# Patient Record
Sex: FEMALE | Race: WHITE | NOT HISPANIC OR LATINO | Employment: OTHER | URBAN - METROPOLITAN AREA
[De-identification: names, ages, dates, MRNs, and addresses within clinical notes are randomized per-mention and may not be internally consistent; named-entity substitution may affect disease eponyms.]

---

## 2023-09-02 PROBLEM — H90.3 SENSORINEURAL HEARING LOSS (SNHL) OF BOTH EARS: Status: ACTIVE | Noted: 2023-09-02

## 2023-09-02 PROBLEM — E78.2 MIXED HYPERLIPIDEMIA: Status: ACTIVE | Noted: 2023-09-02

## 2023-09-02 PROBLEM — E03.9 HYPOTHYROIDISM: Status: ACTIVE | Noted: 2023-09-02

## 2023-09-02 PROBLEM — E10.65 TYPE 1 DIABETES MELLITUS WITH HYPERGLYCEMIA (MULTI): Status: ACTIVE | Noted: 2023-09-02

## 2023-09-02 PROBLEM — K21.9 GASTROESOPHAGEAL REFLUX DISEASE WITHOUT ESOPHAGITIS: Status: ACTIVE | Noted: 2023-09-02

## 2023-09-02 PROBLEM — M81.0 AGE RELATED OSTEOPOROSIS: Status: ACTIVE | Noted: 2023-09-02

## 2023-09-02 PROBLEM — G40.909 EPILEPSY (MULTI): Status: ACTIVE | Noted: 2023-09-02

## 2023-09-02 PROBLEM — N95.1 MENOPAUSAL SYMPTOM: Status: ACTIVE | Noted: 2023-09-02

## 2023-09-02 PROBLEM — E10.8: Status: ACTIVE | Noted: 2023-09-02

## 2023-09-02 PROBLEM — H81.12 BENIGN PAROXYSMAL POSITIONAL VERTIGO OF LEFT EAR: Status: ACTIVE | Noted: 2023-09-02

## 2023-09-02 PROBLEM — R26.9 GAIT ABNORMALITY: Status: ACTIVE | Noted: 2023-09-02

## 2023-09-02 PROBLEM — I10 ESSENTIAL HYPERTENSION: Status: ACTIVE | Noted: 2023-09-02

## 2023-09-02 RX ORDER — ASPIRIN 325 MG
TABLET, DELAYED RELEASE (ENTERIC COATED) ORAL EVERY OTHER DAY
COMMUNITY
End: 2024-02-08 | Stop reason: SDUPTHER

## 2023-09-02 RX ORDER — ONDANSETRON 4 MG/1
4 TABLET, ORALLY DISINTEGRATING ORAL EVERY 6 HOURS PRN
COMMUNITY
End: 2024-02-08 | Stop reason: WASHOUT

## 2023-09-02 RX ORDER — SIMVASTATIN 20 MG/1
20 TABLET, FILM COATED ORAL NIGHTLY
COMMUNITY
End: 2024-02-08 | Stop reason: SDUPTHER

## 2023-09-02 RX ORDER — LEVOTHYROXINE SODIUM 137 UG/1
1 TABLET ORAL DAILY
COMMUNITY
End: 2024-02-08 | Stop reason: SDUPTHER

## 2023-09-02 RX ORDER — PANTOPRAZOLE SODIUM 40 MG/1
40 TABLET, DELAYED RELEASE ORAL DAILY
COMMUNITY
End: 2024-02-08 | Stop reason: SDUPTHER

## 2023-09-02 RX ORDER — INSULIN ASPART 100 [IU]/ML
INJECTION, SOLUTION INTRAVENOUS; SUBCUTANEOUS
COMMUNITY
End: 2024-02-08 | Stop reason: SDUPTHER

## 2023-09-02 RX ORDER — OXYBUTYNIN CHLORIDE 10 MG/1
10 TABLET, EXTENDED RELEASE ORAL DAILY
COMMUNITY
End: 2024-02-08 | Stop reason: SDUPTHER

## 2023-09-02 RX ORDER — LOSARTAN POTASSIUM 100 MG/1
100 TABLET ORAL DAILY
COMMUNITY
End: 2024-02-08 | Stop reason: SDUPTHER

## 2023-09-02 RX ORDER — LEVETIRACETAM 750 MG/1
500 TABLET ORAL 2 TIMES DAILY
COMMUNITY
End: 2024-02-08 | Stop reason: SDUPTHER

## 2023-09-02 RX ORDER — AMLODIPINE BESYLATE 5 MG/1
1 TABLET ORAL DAILY
COMMUNITY
End: 2024-01-12 | Stop reason: SDUPTHER

## 2023-09-02 RX ORDER — MULTIVITAMIN
1 TABLET ORAL DAILY
COMMUNITY
End: 2024-02-08 | Stop reason: SDUPTHER

## 2023-09-02 RX ORDER — MECLIZINE HYDROCHLORIDE 25 MG/1
25 TABLET ORAL 3 TIMES DAILY PRN
COMMUNITY
End: 2024-02-08 | Stop reason: SDUPTHER

## 2023-09-02 RX ORDER — BLOOD SUGAR DIAGNOSTIC
STRIP MISCELLANEOUS
COMMUNITY
End: 2023-11-30

## 2023-09-16 ENCOUNTER — HOSPITAL ENCOUNTER (OUTPATIENT)
Dept: DATA CONVERSION | Facility: HOSPITAL | Age: 78
Discharge: HOME | End: 2023-09-16
Payer: MEDICARE

## 2023-09-16 DIAGNOSIS — R56.9 UNSPECIFIED CONVULSIONS (MULTI): ICD-10-CM

## 2023-10-06 ENCOUNTER — HOSPITAL ENCOUNTER (OUTPATIENT)
Dept: RADIOLOGY | Facility: HOSPITAL | Age: 78
Discharge: HOME | End: 2023-10-06
Payer: MEDICARE

## 2023-10-06 VITALS — HEIGHT: 63 IN | WEIGHT: 150 LBS | BODY MASS INDEX: 26.58 KG/M2

## 2023-10-06 DIAGNOSIS — Z12.31 ENCOUNTER FOR SCREENING MAMMOGRAM FOR MALIGNANT NEOPLASM OF BREAST: ICD-10-CM

## 2023-10-06 PROCEDURE — 77063 BREAST TOMOSYNTHESIS BI: CPT | Mod: 50

## 2023-10-17 DIAGNOSIS — S06.5XAA TRAUMATIC SUBDURAL HEMORRHAGE WITH LOSS OF CONSCIOUSNESS STATUS UNKNOWN, INITIAL ENCOUNTER (MULTI): Primary | ICD-10-CM

## 2023-10-20 ENCOUNTER — LAB (OUTPATIENT)
Dept: LAB | Facility: LAB | Age: 78
End: 2023-10-20
Payer: MEDICARE

## 2023-10-20 DIAGNOSIS — S06.5XAA TRAUMATIC SUBDURAL HEMORRHAGE WITH LOSS OF CONSCIOUSNESS STATUS UNKNOWN, INITIAL ENCOUNTER (MULTI): ICD-10-CM

## 2023-10-20 LAB — LEVETIRACETAM SERPL-MCNC: 11 UG/ML (ref 10–40)

## 2023-10-20 PROCEDURE — 80177 DRUG SCRN QUAN LEVETIRACETAM: CPT

## 2023-10-20 PROCEDURE — 36415 COLL VENOUS BLD VENIPUNCTURE: CPT

## 2023-11-30 DIAGNOSIS — E10.8 DIABETES MELLITUS TYPE 1 WITH MANIFESTATIONS (MULTI): Primary | ICD-10-CM

## 2023-11-30 RX ORDER — BLOOD SUGAR DIAGNOSTIC
STRIP MISCELLANEOUS
Qty: 900 STRIP | Refills: 3 | Status: SHIPPED | OUTPATIENT
Start: 2023-11-30

## 2023-12-07 ENCOUNTER — OFFICE VISIT (OUTPATIENT)
Dept: ENDOCRINOLOGY | Facility: CLINIC | Age: 78
End: 2023-12-07
Payer: MEDICARE

## 2023-12-07 VITALS
HEART RATE: 56 BPM | DIASTOLIC BLOOD PRESSURE: 82 MMHG | HEIGHT: 65 IN | WEIGHT: 154.2 LBS | SYSTOLIC BLOOD PRESSURE: 145 MMHG | BODY MASS INDEX: 25.69 KG/M2

## 2023-12-07 DIAGNOSIS — E06.3 HYPOTHYROIDISM DUE TO HASHIMOTO'S THYROIDITIS: ICD-10-CM

## 2023-12-07 DIAGNOSIS — I10 ESSENTIAL HYPERTENSION: ICD-10-CM

## 2023-12-07 DIAGNOSIS — E10.8 DIABETES MELLITUS TYPE 1 WITH MANIFESTATIONS (MULTI): ICD-10-CM

## 2023-12-07 DIAGNOSIS — E78.2 MIXED HYPERLIPIDEMIA: ICD-10-CM

## 2023-12-07 DIAGNOSIS — E10.65 TYPE 1 DIABETES MELLITUS WITH HYPERGLYCEMIA (MULTI): Primary | ICD-10-CM

## 2023-12-07 DIAGNOSIS — E03.8 HYPOTHYROIDISM DUE TO HASHIMOTO'S THYROIDITIS: ICD-10-CM

## 2023-12-07 LAB — POC HEMOGLOBIN A1C: 6.9 % (ref 4.2–6.5)

## 2023-12-07 PROCEDURE — 3079F DIAST BP 80-89 MM HG: CPT | Performed by: NURSE PRACTITIONER

## 2023-12-07 PROCEDURE — 99213 OFFICE O/P EST LOW 20 MIN: CPT | Performed by: NURSE PRACTITIONER

## 2023-12-07 PROCEDURE — 1126F AMNT PAIN NOTED NONE PRSNT: CPT | Performed by: NURSE PRACTITIONER

## 2023-12-07 PROCEDURE — 1036F TOBACCO NON-USER: CPT | Performed by: NURSE PRACTITIONER

## 2023-12-07 PROCEDURE — 1159F MED LIST DOCD IN RCRD: CPT | Performed by: NURSE PRACTITIONER

## 2023-12-07 PROCEDURE — 3077F SYST BP >= 140 MM HG: CPT | Performed by: NURSE PRACTITIONER

## 2023-12-07 PROCEDURE — 83036 HEMOGLOBIN GLYCOSYLATED A1C: CPT | Performed by: NURSE PRACTITIONER

## 2023-12-07 ASSESSMENT — ENCOUNTER SYMPTOMS: DEPRESSION: 0

## 2023-12-07 ASSESSMENT — PAIN SCALES - GENERAL: PAINLEVEL: 0-NO PAIN

## 2023-12-07 NOTE — PROGRESS NOTES
"HPI:  Here for follow up/metabolic management 77 yo with DM Type 1 diagnosed at age 27. History HTN, HLD, Hypothyroidism, GERD and Seizures. Current A1C 6.9% was 6.7%. Patient testing sugars about 10 times a day by finger sticks. Desire Guardian sensor, having communication issues with company. Recalls blood sugars L/D 90-140s B 150s, random excursion 200, otherwise mostly in target. Following carb controlled diet somewhat and know reasonable carb allowances. Patient able to afford their medications. Patient is exercising         -CGM discusssed and willing to use Guardian or Mariola sensor.         -INSULIN Aspart         -HTN Losartan, Amlodipine daily at goal/not at goal med/dose         -STATIN Simvastatin LDL 52         She has no desire to switch insulin pumps.    Could not download pump this visit due to technological issues.     /82 (BP Location: Left arm, Patient Position: Sitting)   Pulse 56   Ht 1.651 m (5' 5\")   Wt 69.9 kg (154 lb 3.2 oz)   BMI 25.66 kg/m²     Labs:  Lab Results   Component Value Date    WBC 4.8 04/21/2023    NRBC 0 04/21/2023    RBC 4.38 04/21/2023    HGB 13.7 04/21/2023    HCT 41.2 04/21/2023     04/21/2023     Lab Results   Component Value Date    CALCIUM 9.4 04/21/2023    AST 18 04/21/2023    ALKPHOS 82 04/21/2023    BILITOT 0.9 04/21/2023    PROT 6.2 04/21/2023    ALBUMIN 4.0 04/21/2023    GLOB 2.2 04/21/2023    AGR 1.8 04/21/2023     04/21/2023    K 4.8 04/21/2023     04/21/2023    CO2 26 04/21/2023    ANIONGAP 11 04/21/2023    BUN 18 04/21/2023    CREATININE 0.9 04/21/2023    UREACREAUR 20.0 04/21/2023    GLUCOSE 167 (H) 04/21/2023    ALT 17 04/21/2023    EGFR 67 04/21/2023     Lab Results   Component Value Date    CHOL 140 04/21/2023    TRIG 79 04/21/2023    HDL 72 04/21/2023    LDLCALC 52 (L) 04/21/2023     Lab Results   Component Value Date    MICROALBCREA 8.9 04/21/2023     Lab Results   Component Value Date    TSH 0.30 04/21/2023     Lab Results "   Component Value Date    [id number] 404 01/09/2023     Lab Results   Component Value Date    HGBA1C 6.9 (A) 12/07/2023         Current Outpatient Medications:     amLODIPine (Norvasc) 5 mg tablet, Take 1 tablet (5 mg) by mouth once daily., Disp: , Rfl:     blood sugar diagnostic (Accu-Chek Guide test strips) strip, USE TO TEST 10 TIMES DAILY, Disp: 900 strip, Rfl: 3    cholecalciferol (Vitamin D-3) 1,250 mcg (50,000 unit) capsule, Take by mouth every other day., Disp: , Rfl:     insulin aspart (NovoLOG) 100 unit/mL injection, Inject under the skin. USE AS DIRECTED, Disp: , Rfl:     levETIRAcetam (Keppra) 500 mg tablet, Take 1 tablet (500 mg) by mouth 2 times a day., Disp: , Rfl:     levothyroxine (LevoxyL) 137 mcg tablet, Take 1 tablet (137 mcg) by mouth once daily., Disp: , Rfl:     losartan (Cozaar) 100 mg tablet, Take 1 tablet (100 mg) by mouth once daily., Disp: , Rfl:     meclizine (Antivert) 25 mg tablet, Take 1 tablet (25 mg) by mouth 3 times a day as needed., Disp: , Rfl:     multivitamin tablet, Take 1 tablet by mouth once daily., Disp: , Rfl:     ondansetron ODT (Zofran-ODT) 4 mg disintegrating tablet, Take 1 tablet (4 mg) by mouth every 6 hours if needed., Disp: , Rfl:     oxybutynin XL (Ditropan-XL) 10 mg 24 hr tablet, Take 1 tablet (10 mg) by mouth once daily., Disp: , Rfl:     pantoprazole (ProtoNix) 40 mg EC tablet, Take 1 tablet (40 mg) by mouth once daily., Disp: , Rfl:     simvastatin (Zocor) 20 mg tablet, Take 1 tablet (20 mg) by mouth once daily at bedtime., Disp: , Rfl:     Review of Systems:  Cardiology: Lightheadedness-denies.  Chest pain-denies.  Leg edema-denies.  Palpitations-denies.  Respiratory: Cough-denies. Shortness of breath-denies.  Wheezing-denies.  Gastroenterology: Constipation-denies.  Diarrhea-denies.  Heartburn-denies.  Endocrinology: Cold intolerance-denies.  Heat intolerance-denies.  Sweats-denies.  Neurology: Headache-denies.  Tremor-denies.  Neuropathy in  extremities-denies.  Psychology: Low energy-denies.  Irritability-denies.  Sleep disturbances-denies.    Physical Exam:  General Appearance: pleasant, cooperative, no acute distress  HEENT: no chemosis, no proptosis, no lid lag, no lid retraction  Neck: no lymphadenopathy, no thyromegaly, no dominant thyroid nodules  Heart: no murmurs, regular rate and rhythm, S1 and S2  Lungs: no wheezes, no rhonci, no rales  Extremities: no lower extremity swelling    Assessment and Plan:  1. Type 1 diabetes mellitus with hyperglycemia (CMS/HCC)  -excellent A1c control  -no pump setting changes  -in the process of getting Guardian sensor to work with smart phone sharda     - POCT glycosylated hemoglobin (Hb A1C) manually resulted    2. Mixed hyperlipidemia  -LDL target < 70  -tolerates statin    3. Essential hypertension  -stable    4. Hypothyroidism due to Hashimoto's thyroiditis  -euthyroid  -no compressive/obstructive neck complaints     Follow Up: 3 months      -labs/tests/notes reviewed  -reviewed and counseled patient on medication monitoring and side effects

## 2024-01-08 DIAGNOSIS — E10.8 DIABETES MELLITUS TYPE 1 WITH MANIFESTATIONS (MULTI): Primary | ICD-10-CM

## 2024-01-10 ENCOUNTER — LAB (OUTPATIENT)
Dept: LAB | Facility: LAB | Age: 79
End: 2024-01-10
Payer: MEDICARE

## 2024-01-10 DIAGNOSIS — E10.8 DIABETES MELLITUS TYPE 1 WITH MANIFESTATIONS (MULTI): ICD-10-CM

## 2024-01-10 DIAGNOSIS — Z01.89 ENCOUNTER FOR OTHER SPECIFIED SPECIAL EXAMINATIONS: Primary | ICD-10-CM

## 2024-01-10 DIAGNOSIS — R56.9 UNSPECIFIED CONVULSIONS (MULTI): ICD-10-CM

## 2024-01-10 DIAGNOSIS — E10.65 TYPE 1 DIABETES MELLITUS WITH HYPERGLYCEMIA (MULTI): ICD-10-CM

## 2024-01-10 DIAGNOSIS — E03.9 HYPOTHYROIDISM, UNSPECIFIED: ICD-10-CM

## 2024-01-10 LAB
ALBUMIN SERPL-MCNC: 4.3 G/DL (ref 3.5–5)
ALP BLD-CCNC: 83 U/L (ref 35–125)
ALT SERPL-CCNC: 15 U/L (ref 5–40)
ANION GAP SERPL CALC-SCNC: 10 MMOL/L
ANION GAP SERPL CALC-SCNC: 10 MMOL/L
AST SERPL-CCNC: 16 U/L (ref 5–40)
BASOPHILS # BLD AUTO: 0.05 X10*3/UL (ref 0–0.1)
BASOPHILS NFR BLD AUTO: 1.2 %
BILIRUB DIRECT SERPL-MCNC: <0.2 MG/DL (ref 0–0.2)
BILIRUB SERPL-MCNC: 0.9 MG/DL (ref 0.1–1.2)
BUN SERPL-MCNC: 18 MG/DL (ref 8–25)
BUN SERPL-MCNC: 18 MG/DL (ref 8–25)
C PEPTIDE SERPL-MCNC: 0.1 NG/ML (ref 0.7–3.9)
CALCIUM SERPL-MCNC: 9.2 MG/DL (ref 8.5–10.4)
CALCIUM SERPL-MCNC: 9.2 MG/DL (ref 8.5–10.4)
CHLORIDE SERPL-SCNC: 105 MMOL/L (ref 97–107)
CHLORIDE SERPL-SCNC: 105 MMOL/L (ref 97–107)
CHOLEST SERPL-MCNC: 164 MG/DL (ref 133–200)
CHOLEST/HDLC SERPL: 2 {RATIO}
CO2 SERPL-SCNC: 27 MMOL/L (ref 24–31)
CO2 SERPL-SCNC: 28 MMOL/L (ref 24–31)
CREAT SERPL-MCNC: 0.8 MG/DL (ref 0.4–1.6)
CREAT SERPL-MCNC: 0.8 MG/DL (ref 0.4–1.6)
EGFRCR SERPLBLD CKD-EPI 2021: 76 ML/MIN/1.73M*2
EGFRCR SERPLBLD CKD-EPI 2021: 76 ML/MIN/1.73M*2
EOSINOPHIL # BLD AUTO: 0.09 X10*3/UL (ref 0–0.4)
EOSINOPHIL NFR BLD AUTO: 2.2 %
ERYTHROCYTE [DISTWIDTH] IN BLOOD BY AUTOMATED COUNT: 12.2 % (ref 11.5–14.5)
EST. AVERAGE GLUCOSE BLD GHB EST-MCNC: 151 MG/DL
GLUCOSE SERPL-MCNC: 214 MG/DL (ref 65–99)
GLUCOSE SERPL-MCNC: 215 MG/DL (ref 65–99)
HBA1C MFR BLD: 6.9 %
HCT VFR BLD AUTO: 44.7 % (ref 36–46)
HDLC SERPL-MCNC: 81 MG/DL
HGB BLD-MCNC: 14.4 G/DL (ref 12–16)
IMM GRANULOCYTES # BLD AUTO: 0.02 X10*3/UL (ref 0–0.5)
IMM GRANULOCYTES NFR BLD AUTO: 0.5 % (ref 0–0.9)
LDLC SERPL CALC-MCNC: 70 MG/DL (ref 65–130)
LEVETIRACETAM SERPL-MCNC: 17 UG/ML (ref 10–40)
LYMPHOCYTES # BLD AUTO: 1.17 X10*3/UL (ref 0.8–3)
LYMPHOCYTES NFR BLD AUTO: 28.5 %
MCH RBC QN AUTO: 30.3 PG (ref 26–34)
MCHC RBC AUTO-ENTMCNC: 32.2 G/DL (ref 32–36)
MCV RBC AUTO: 94 FL (ref 80–100)
MONOCYTES # BLD AUTO: 0.27 X10*3/UL (ref 0.05–0.8)
MONOCYTES NFR BLD AUTO: 6.6 %
NEUTROPHILS # BLD AUTO: 2.5 X10*3/UL (ref 1.6–5.5)
NEUTROPHILS NFR BLD AUTO: 61 %
NRBC BLD-RTO: 0 /100 WBCS (ref 0–0)
PLATELET # BLD AUTO: 249 X10*3/UL (ref 150–450)
POTASSIUM SERPL-SCNC: 4.6 MMOL/L (ref 3.4–5.1)
POTASSIUM SERPL-SCNC: 4.7 MMOL/L (ref 3.4–5.1)
PROT SERPL-MCNC: 6.2 G/DL (ref 5.9–7.9)
RBC # BLD AUTO: 4.76 X10*6/UL (ref 4–5.2)
SODIUM SERPL-SCNC: 142 MMOL/L (ref 133–145)
SODIUM SERPL-SCNC: 143 MMOL/L (ref 133–145)
T4 FREE SERPL-MCNC: 1.8 NG/DL (ref 0.9–1.7)
TRIGL SERPL-MCNC: 65 MG/DL (ref 40–150)
TSH SERPL DL<=0.05 MIU/L-ACNC: 2.19 MIU/L (ref 0.27–4.2)
WBC # BLD AUTO: 4.1 X10*3/UL (ref 4.4–11.3)

## 2024-01-10 PROCEDURE — 80177 DRUG SCRN QUAN LEVETIRACETAM: CPT

## 2024-01-10 PROCEDURE — 84439 ASSAY OF FREE THYROXINE: CPT

## 2024-01-10 PROCEDURE — 36415 COLL VENOUS BLD VENIPUNCTURE: CPT

## 2024-01-10 PROCEDURE — 85025 COMPLETE CBC W/AUTO DIFF WBC: CPT

## 2024-01-10 PROCEDURE — 80048 BASIC METABOLIC PNL TOTAL CA: CPT

## 2024-01-10 PROCEDURE — 83036 HEMOGLOBIN GLYCOSYLATED A1C: CPT

## 2024-01-10 PROCEDURE — 80053 COMPREHEN METABOLIC PANEL: CPT

## 2024-01-10 PROCEDURE — 84443 ASSAY THYROID STIM HORMONE: CPT

## 2024-01-10 PROCEDURE — 82248 BILIRUBIN DIRECT: CPT

## 2024-01-10 PROCEDURE — 84681 ASSAY OF C-PEPTIDE: CPT

## 2024-01-10 PROCEDURE — 80061 LIPID PANEL: CPT

## 2024-01-12 DIAGNOSIS — I10 ESSENTIAL HYPERTENSION: Primary | ICD-10-CM

## 2024-01-12 RX ORDER — AMLODIPINE BESYLATE 5 MG/1
5 TABLET ORAL DAILY
Qty: 90 TABLET | Refills: 3 | Status: SHIPPED | OUTPATIENT
Start: 2024-01-12 | End: 2024-02-08 | Stop reason: SDUPTHER

## 2024-02-08 ENCOUNTER — OFFICE VISIT (OUTPATIENT)
Dept: PRIMARY CARE | Facility: CLINIC | Age: 79
End: 2024-02-08
Payer: MEDICARE

## 2024-02-08 VITALS
BODY MASS INDEX: 25.16 KG/M2 | TEMPERATURE: 97.9 F | HEIGHT: 65 IN | SYSTOLIC BLOOD PRESSURE: 132 MMHG | DIASTOLIC BLOOD PRESSURE: 78 MMHG | HEART RATE: 75 BPM | OXYGEN SATURATION: 99 % | WEIGHT: 151 LBS

## 2024-02-08 DIAGNOSIS — G40.909 SEIZURE DISORDER (MULTI): ICD-10-CM

## 2024-02-08 DIAGNOSIS — E06.3 HYPOTHYROIDISM DUE TO HASHIMOTO'S THYROIDITIS: ICD-10-CM

## 2024-02-08 DIAGNOSIS — E03.8 HYPOTHYROIDISM DUE TO HASHIMOTO'S THYROIDITIS: ICD-10-CM

## 2024-02-08 DIAGNOSIS — K21.9 GASTROESOPHAGEAL REFLUX DISEASE WITHOUT ESOPHAGITIS: ICD-10-CM

## 2024-02-08 DIAGNOSIS — E78.2 MIXED HYPERLIPIDEMIA: ICD-10-CM

## 2024-02-08 DIAGNOSIS — I10 ESSENTIAL HYPERTENSION: ICD-10-CM

## 2024-02-08 DIAGNOSIS — E55.9 VITAMIN D DEFICIENCY: ICD-10-CM

## 2024-02-08 DIAGNOSIS — Z87.898 HISTORY OF VERTIGO: ICD-10-CM

## 2024-02-08 DIAGNOSIS — E10.69 TYPE 1 DIABETES MELLITUS WITH OTHER SPECIFIED COMPLICATION (MULTI): Primary | ICD-10-CM

## 2024-02-08 DIAGNOSIS — R32 URINARY INCONTINENCE, UNSPECIFIED TYPE: ICD-10-CM

## 2024-02-08 PROBLEM — E10.8: Status: RESOLVED | Noted: 2023-09-02 | Resolved: 2024-02-08

## 2024-02-08 PROBLEM — E10.9 TYPE 1 DIABETES MELLITUS (MULTI): Status: ACTIVE | Noted: 2023-09-02

## 2024-02-08 PROBLEM — R26.9 GAIT ABNORMALITY: Status: RESOLVED | Noted: 2023-09-02 | Resolved: 2024-02-08

## 2024-02-08 PROBLEM — E10.9 TYPE 1 DIABETES MELLITUS (MULTI): Status: RESOLVED | Noted: 2023-09-02 | Resolved: 2024-02-08

## 2024-02-08 PROCEDURE — 1160F RVW MEDS BY RX/DR IN RCRD: CPT | Performed by: STUDENT IN AN ORGANIZED HEALTH CARE EDUCATION/TRAINING PROGRAM

## 2024-02-08 PROCEDURE — 3078F DIAST BP <80 MM HG: CPT | Performed by: STUDENT IN AN ORGANIZED HEALTH CARE EDUCATION/TRAINING PROGRAM

## 2024-02-08 PROCEDURE — 1159F MED LIST DOCD IN RCRD: CPT | Performed by: STUDENT IN AN ORGANIZED HEALTH CARE EDUCATION/TRAINING PROGRAM

## 2024-02-08 PROCEDURE — 3075F SYST BP GE 130 - 139MM HG: CPT | Performed by: STUDENT IN AN ORGANIZED HEALTH CARE EDUCATION/TRAINING PROGRAM

## 2024-02-08 PROCEDURE — 1126F AMNT PAIN NOTED NONE PRSNT: CPT | Performed by: STUDENT IN AN ORGANIZED HEALTH CARE EDUCATION/TRAINING PROGRAM

## 2024-02-08 PROCEDURE — 99214 OFFICE O/P EST MOD 30 MIN: CPT | Performed by: STUDENT IN AN ORGANIZED HEALTH CARE EDUCATION/TRAINING PROGRAM

## 2024-02-08 PROCEDURE — 1036F TOBACCO NON-USER: CPT | Performed by: STUDENT IN AN ORGANIZED HEALTH CARE EDUCATION/TRAINING PROGRAM

## 2024-02-08 RX ORDER — PANTOPRAZOLE SODIUM 40 MG/1
40 TABLET, DELAYED RELEASE ORAL
Start: 2024-02-08

## 2024-02-08 RX ORDER — ASPIRIN 325 MG
50000 TABLET, DELAYED RELEASE (ENTERIC COATED) ORAL
Start: 2024-02-08 | End: 2024-03-07 | Stop reason: WASHOUT

## 2024-02-08 RX ORDER — INSULIN ASPART 100 [IU]/ML
INJECTION, SOLUTION INTRAVENOUS; SUBCUTANEOUS
Start: 2024-02-08 | End: 2024-03-14 | Stop reason: SDUPTHER

## 2024-02-08 RX ORDER — OXYBUTYNIN CHLORIDE 10 MG/1
10 TABLET, EXTENDED RELEASE ORAL DAILY
Start: 2024-02-08

## 2024-02-08 RX ORDER — LOSARTAN POTASSIUM 100 MG/1
100 TABLET ORAL DAILY
Start: 2024-02-08

## 2024-02-08 RX ORDER — LEVETIRACETAM 750 MG/1
750 TABLET ORAL 2 TIMES DAILY
Start: 2024-02-08

## 2024-02-08 RX ORDER — MECLIZINE HYDROCHLORIDE 25 MG/1
25 TABLET ORAL 3 TIMES DAILY PRN
Start: 2024-02-08 | End: 2024-03-28 | Stop reason: WASHOUT

## 2024-02-08 RX ORDER — MULTIVITAMIN
1 TABLET ORAL DAILY
Start: 2024-02-08 | End: 2024-03-28 | Stop reason: WASHOUT

## 2024-02-08 RX ORDER — SIMVASTATIN 20 MG/1
20 TABLET, FILM COATED ORAL DAILY
Start: 2024-02-08 | End: 2024-05-21

## 2024-02-08 RX ORDER — LEVOTHYROXINE SODIUM 137 UG/1
137 TABLET ORAL
Start: 2024-02-08

## 2024-02-08 RX ORDER — AMLODIPINE BESYLATE 5 MG/1
5 TABLET ORAL DAILY
Start: 2024-02-08 | End: 2024-04-11 | Stop reason: SDUPTHER

## 2024-02-08 ASSESSMENT — PATIENT HEALTH QUESTIONNAIRE - PHQ9
2. FEELING DOWN, DEPRESSED OR HOPELESS: NOT AT ALL
SUM OF ALL RESPONSES TO PHQ9 QUESTIONS 1 AND 2: 0
1. LITTLE INTEREST OR PLEASURE IN DOING THINGS: NOT AT ALL

## 2024-02-08 ASSESSMENT — ENCOUNTER SYMPTOMS
GASTROINTESTINAL NEGATIVE: 1
CONSTITUTIONAL NEGATIVE: 1
CARDIOVASCULAR NEGATIVE: 1
RESPIRATORY NEGATIVE: 1

## 2024-02-08 ASSESSMENT — PAIN SCALES - GENERAL: PAINLEVEL: 0-NO PAIN

## 2024-02-08 NOTE — PROGRESS NOTES
[facility]: [facility]  PROGRESS NOTE      Pat Jaimes is a 78 y.o. female that is presenting today for Follow-up (6 month).    Assessment/Plan   Diagnoses and all orders for this visit:  Type 1 diabetes mellitus with other specified complication (CMS/[facility])  -     insulin aspart (NovoLOG) 100 unit/mL injection; Take per insulin instructions. INSULIN PUMP  -     Referral to Endocrinology; Future  -     Follow Up In Primary Care; Future  Gastroesophageal reflux disease without esophagitis  -     pantoprazole (ProtoNix) 40 mg EC tablet; Take 1 tablet (40 mg) by mouth once daily in the morning. Take before meals.  Hypothyroidism due to Hashimoto's thyroiditis  -     LevoxyL 137 mcg tablet; Take 1 tablet (137 mcg) by mouth once daily in the morning. Take before meals.  Urinary incontinence, unspecified type  -     oxybutynin XL (Ditropan-XL) 10 mg 24 hr tablet; Take 1 tablet (10 mg) by mouth once daily.  Seizure disorder (CMS/[facility])  -     levETIRAcetam (Keppra) 750 mg tablet; Take 1 tablet (750 mg) by mouth 2 times a day.  Essential hypertension  -     amLODIPine (Norvasc) 5 mg tablet; Take 1 tablet (5 mg) by mouth once daily.  -     losartan (Cozaar) 100 mg tablet; Take 1 tablet (100 mg) by mouth once daily.  Mixed hyperlipidemia  -     simvastatin (Zocor) 20 mg tablet; Take 1 tablet (20 mg) by mouth once daily.  Vitamin D deficiency  -     cholecalciferol (Vitamin D-3) 50,000 unit capsule; Take 1 capsule (50,000 Units) by mouth 1 (one) time per week.  -     multivitamin tablet; Take 1 tablet by mouth once daily.  History of vertigo  -     meclizine (Antivert) 25 mg tablet; Take 1 tablet (25 mg) by mouth 3 times a day as needed for dizziness or nausea.  -     Referral to Physical Therapy; Future    - Significant medication and problem list reconciliation done today.  - A1c well-controlled. No changes at this time. Patient requesting new endocrinologist. Referral placed today.  - Patient being  evaluated for meningioma. Requesting advice on what to do during MRI because of her vertigo. Discussed with her that options are limited outside of vestibular rehabilitation. Patient agreeable.  - Patient had labwork done for this appointment. Everything looked great. Will order labwork for the patient's next appointment.    Subjective   - The patient otherwise feels well and denies any acute symptoms or concerns at this time.  - The patient denies any changes or progression of their chronic medical problems.  - The patient denies any problems or concerns with their medications.      Review of Systems   Constitutional: Negative.    Respiratory: Negative.     Cardiovascular: Negative.    Gastrointestinal: Negative.       Objective   Vitals:    02/08/24 1401   BP: 132/78   Pulse: 75   Temp: 36.6 °C (97.9 °F)   SpO2: 99%      Body mass index is 25.13 kg/m².  Physical Exam  Constitutional:       General: She is not in acute distress.  Neck:      Vascular: No carotid bruit.   Cardiovascular:      Rate and Rhythm: Normal rate and regular rhythm.      Heart sounds: Normal heart sounds.   Pulmonary:      Effort: Pulmonary effort is normal.      Breath sounds: Normal breath sounds.   Musculoskeletal:         General: No swelling.   Neurological:      Mental Status: She is alert. Mental status is at baseline.   Psychiatric:         Mood and Affect: Mood normal.       Diagnostic Results   Lab Results   Component Value Date    GLUCOSE 214 (H) 01/10/2024    CALCIUM 9.2 01/10/2024     01/10/2024    K 4.6 01/10/2024    CO2 28 01/10/2024     01/10/2024    BUN 18 01/10/2024    CREATININE 0.80 01/10/2024     Lab Results   Component Value Date    ALT 15 01/10/2024    AST 16 01/10/2024    ALKPHOS 83 01/10/2024    BILITOT 0.9 01/10/2024     Lab Results   Component Value Date    WBC 4.1 (L) 01/10/2024    HGB 14.4 01/10/2024    HCT 44.7 01/10/2024    MCV 94 01/10/2024     01/10/2024     Lab Results   Component Value  "Date    CHOL 164 01/10/2024    CHOL 140 04/21/2023    CHOL 168 06/14/2022     Lab Results   Component Value Date    HDL 81.0 01/10/2024    HDL 72 04/21/2023    HDL 82 06/14/2022     Lab Results   Component Value Date    LDLCALC 70 01/10/2024    LDLCALC 52 (L) 04/21/2023    LDLCALC 75 06/14/2022     Lab Results   Component Value Date    TRIG 65 01/10/2024    TRIG 79 04/21/2023    TRIG 54 06/14/2022     No components found for: \"CHOLHDL\"  Lab Results   Component Value Date    HGBA1C 6.9 (H) 01/10/2024     Other labs not included in the list above were reviewed either before or during this encounter.    History    Past Medical History:   Diagnosis Date    Diabetes mellitus type I (CMS/HCC)     Hyperlipidemia     Hypertension     Hypothyroidism     Insulin pump in place     Other specified diabetes mellitus without complications (CMS/HCC)     Diabetes mellitus of other type without complication    Personal history of other diseases of the circulatory system     History of hypertension    Seizure disorder (CMS/HCC)      Past Surgical History:   Procedure Laterality Date    BI STEREOTACTIC GUIDED BREAST RIGHT LOCALIZATION AND BIOPSY Right 02/02/2018    BI STEREOTACTIC GUIDED BREAST LOCALIZATION AND BIOPSY RIGHT [facility]    KNEE SURGERY Right 1989    ARTHROSCOPY    OTHER SURGICAL HISTORY  05/25/2021    Craniotomy    TUBAL LIGATION Bilateral 1982     Family History   Problem Relation Name Age of Onset    Diabetes Mother      Heart disease Mother      Heart disease Father       Social History     Socioeconomic History    Marital status:      Spouse name: Not on file    Number of children: Not on file    Years of education: Not on file    Highest education level: Not on file   Occupational History    Not on file   Tobacco Use    Smoking status: Never     Passive exposure: Never    Smokeless tobacco: Never   Vaping Use    Vaping Use: Never used   Substance and Sexual Activity    Alcohol use: Not Currently    " Drug use: Never    Sexual activity: Not on file   Other Topics Concern    Not on file   Social History Narrative    Not on file     Social Determinants of Health     Financial Resource Strain: Not on file   Food Insecurity: Not on file   Transportation Needs: Not on file   Physical Activity: Not on file   Stress: Not on file   Social Connections: Not on file   Intimate Partner Violence: Not on file   Housing Stability: Not on file     Allergies   Allergen Reactions    Sulfa (Sulfonamide Antibiotics) Hives    Sulfamethoxazole-Trimethoprim Hives    Erythromycin Other    Amoxicillin-Pot Clavulanate Diarrhea     Current Outpatient Medications on File Prior to Visit   Medication Sig Dispense Refill    blood sugar diagnostic (Accu-Chek Guide test strips) strip USE TO TEST 10 TIMES DAILY 900 strip 3    [DISCONTINUED] amLODIPine (Norvasc) 5 mg tablet Take 1 tablet (5 mg) by mouth once daily. 90 tablet 3    [DISCONTINUED] cholecalciferol (Vitamin D-3) 1,250 mcg (50,000 unit) capsule Take by mouth every other day.      [DISCONTINUED] insulin aspart (NovoLOG) 100 unit/mL injection Inject under the skin. USE AS DIRECTED      [DISCONTINUED] levETIRAcetam (Keppra) 750 mg tablet Take 500 mg by mouth 2 times a day.      [DISCONTINUED] levothyroxine (LevoxyL) 137 mcg tablet Take 1 tablet (137 mcg) by mouth once daily.      [DISCONTINUED] losartan (Cozaar) 100 mg tablet Take 1 tablet (100 mg) by mouth once daily.      [DISCONTINUED] meclizine (Antivert) 25 mg tablet Take 1 tablet (25 mg) by mouth 3 times a day as needed.      [DISCONTINUED] multivitamin tablet Take 1 tablet by mouth once daily.      [DISCONTINUED] oxybutynin XL (Ditropan-XL) 10 mg 24 hr tablet Take 1 tablet (10 mg) by mouth once daily.      [DISCONTINUED] pantoprazole (ProtoNix) 40 mg EC tablet Take 1 tablet (40 mg) by mouth once daily.      [DISCONTINUED] simvastatin (Zocor) 20 mg tablet Take 1 tablet (20 mg) by mouth once daily at bedtime.      [DISCONTINUED]  ondansetron ODT (Zofran-ODT) 4 mg disintegrating tablet Take 1 tablet (4 mg) by mouth every 6 hours if needed.       No current facility-administered medications on file prior to visit.     Immunization History   Administered Date(s) Administered    Flu vaccine, quadrivalent, high-dose, preservative free, age 65y+ (FLUZONE) 09/11/2020, 09/28/2020, 10/04/2021, 09/19/2022    Influenza, High Dose Seasonal, Preservative Free 10/02/2018    Influenza, injectable, quadrivalent 10/30/2017, 09/26/2019    Influenza, seasonal, injectable 10/01/2012, 10/01/2014, 10/15/2014, 09/24/2015    Influenza, seasonal, injectable, preservative free 10/06/2008    Novel influenza-H1N1-09, preservative-free 11/15/2009    Pfizer COVID-19 vaccine, Fall 2023, 12 years and older, (30mcg/0.3mL) 12/12/2023    Pfizer COVID-19 vaccine, bivalent, age 12 years and older (30 mcg/0.3 mL) 12/19/2022    Pfizer Purple Cap SARS-CoV-2 03/04/2021, 04/01/2021, 11/08/2021    Pneumococcal conjugate vaccine, 13-valent (PREVNAR 13) 11/22/2012    Tdap vaccine, age 7 year and older (BOOSTRIX, ADACEL) 06/11/2016    Zoster, live 12/11/2009     Patient's medical history was reviewed and updated either before or during this encounter.       En Yee MD

## 2024-02-16 ENCOUNTER — CLINICAL SUPPORT (OUTPATIENT)
Dept: PHYSICAL THERAPY | Facility: CLINIC | Age: 79
End: 2024-02-16
Payer: MEDICARE

## 2024-02-16 DIAGNOSIS — Z87.898 HISTORY OF VERTIGO: ICD-10-CM

## 2024-02-16 DIAGNOSIS — R26.89 IMBALANCE: ICD-10-CM

## 2024-02-16 DIAGNOSIS — H81.12 BPPV (BENIGN PAROXYSMAL POSITIONAL VERTIGO), LEFT: Primary | ICD-10-CM

## 2024-02-16 PROCEDURE — 97162 PT EVAL MOD COMPLEX 30 MIN: CPT | Mod: GP | Performed by: PHYSICAL THERAPIST

## 2024-02-16 PROCEDURE — 95992 CANALITH REPOSITIONING PROC: CPT | Mod: GP | Performed by: PHYSICAL THERAPIST

## 2024-02-16 ASSESSMENT — ENCOUNTER SYMPTOMS
OCCASIONAL FEELINGS OF UNSTEADINESS: 1
DEPRESSION: 0
LOSS OF SENSATION IN FEET: 0

## 2024-02-16 NOTE — PROGRESS NOTES
Physical Therapy Evaluation    Patient Name: Pat Jaimes  MRN: [id number]  Evaluation Date: 2/16/2024  Time Calculation  Start Time: 1445  Stop Time: 1532  Time Calculation (min): 47 min      Subjective   General:     Pt arrives with script,  Diagnosis: History of vertigo (Z87.898)     Patient reported hx of injury: pt has had vertigo attacks for 15 years with leaning head back or going to lye down, turning head.  Uses walking stick at times    Surgery:   Remote craniotomy    Red Flags:  none    Precautions:   Fall risk due to vertigo, DM.  Meningioma on brain, Sz hx.  Pt and spouse states no activity restrictions.  Pain/dizzy/nausea:   0/0/0 now, but severe at times    Home Living:     Pt gets around home safely  Home type: House  Stairs: Yes-reciprocal with rail  Lives with: Significant Other    Work:  retired    Prior Function Per Pt/Caregiver Report:   Pt had better balance, did not need walking stick    Pt goals: be able to get CT scan, and MRI next week (to monitor meningioma), as well as improve balance.  Imaging:  IMPRESSION:  Status post left parietal craniotomy with some calcification identified  within the left subdural space without residual subdural hematoma.     8 mm rounded extra-axial calcified mass in the left posterior parietal  region most consistent with a small meningioma.     No acute intracranial process.    OBJECTIVE:  Objective     C-spine AROM:   ext mod/major decr, flexion min decr, B rot mod decr    Vestibular/Balance Assessment:    Occulomotor, Vertigo Tests, etc. +/-   Smooth pursuits +   Saccades    VOR -   Sharp-Wil Test -   [place]-Hallpike L +, UPBEATING NYSTAGMUS   [place]-Hallpike R    Roll Test R    Roll Test L    JPSE of C-spine    [place] Convergence        Balance Test Scores    MCTSIB 2/4 (30,6,30,2) shoes on           Gait:   Decr stride and linh      Outcome Measures:  DHI 28    OP EDUCATION:   Dx, poc, Epley, pillow with MRI as allowed    Today's Treatment:  Jason  "repo  HEP to be completed daily, exercises include:  Epley as needed for + L bppv    Assessment       pt is  with chronic vertigo and needs PT improve balance/sensory integration and decr vertigo to restore function and allow MRI with less anxiety and dizziness.    Plan   Next visit retest L Rock-Hallpike, other BPPV tests as needed, begin balance activities  Skilled PT consisting of:  Aquatics, therapeutic exercise, therapeutic activity, self care, NMR, manual, thermal, electric stimulation, US, light therapy, gait training, transfer training, dry needle, canalith repositioning  Rehab Potential: good  Frequency:  2x/wk  Duration:  10 weeks    Goals:    STG:   Pt to be I in initial HEP.    LTG\"s:  - BPPV testing  Improve score on outcome form by 10 points to show incr in function.  Decr max dizziness level to 2/10 for community activity.  MCTSIB 4/4 for incr sensory integration.  .    "

## 2024-02-19 ENCOUNTER — HOSPITAL ENCOUNTER (OUTPATIENT)
Dept: RADIOLOGY | Facility: CLINIC | Age: 79
Discharge: HOME | End: 2024-02-19
Payer: MEDICARE

## 2024-02-19 DIAGNOSIS — R56.9 UNSPECIFIED CONVULSIONS (MULTI): ICD-10-CM

## 2024-02-19 PROCEDURE — 70553 MRI BRAIN STEM W/O & W/DYE: CPT

## 2024-02-19 PROCEDURE — A9575 INJ GADOTERATE MEGLUMI 0.1ML: HCPCS | Performed by: PSYCHIATRY & NEUROLOGY

## 2024-02-19 PROCEDURE — 2550000001 HC RX 255 CONTRASTS: Performed by: PSYCHIATRY & NEUROLOGY

## 2024-02-19 RX ORDER — GADOTERATE MEGLUMINE 376.9 MG/ML
15 INJECTION INTRAVENOUS
Status: COMPLETED | OUTPATIENT
Start: 2024-02-19 | End: 2024-02-19

## 2024-02-19 RX ADMIN — GADOTERATE MEGLUMINE 14 ML: 376.9 INJECTION INTRAVENOUS at 09:30

## 2024-03-05 ENCOUNTER — OFFICE VISIT (OUTPATIENT)
Dept: ENDOCRINOLOGY | Facility: CLINIC | Age: 79
End: 2024-03-05
Payer: MEDICARE

## 2024-03-05 ENCOUNTER — APPOINTMENT (OUTPATIENT)
Dept: PHYSICAL THERAPY | Facility: CLINIC | Age: 79
End: 2024-03-05
Payer: MEDICARE

## 2024-03-05 VITALS
HEART RATE: 54 BPM | SYSTOLIC BLOOD PRESSURE: 149 MMHG | WEIGHT: 153 LBS | DIASTOLIC BLOOD PRESSURE: 73 MMHG | BODY MASS INDEX: 25.46 KG/M2

## 2024-03-05 DIAGNOSIS — E78.2 MIXED HYPERLIPIDEMIA: ICD-10-CM

## 2024-03-05 DIAGNOSIS — I10 ESSENTIAL HYPERTENSION: ICD-10-CM

## 2024-03-05 DIAGNOSIS — Z96.41 INSULIN PUMP IN PLACE: ICD-10-CM

## 2024-03-05 DIAGNOSIS — E03.8 HYPOTHYROIDISM DUE TO HASHIMOTO'S THYROIDITIS: ICD-10-CM

## 2024-03-05 DIAGNOSIS — E06.3 HYPOTHYROIDISM DUE TO HASHIMOTO'S THYROIDITIS: ICD-10-CM

## 2024-03-05 DIAGNOSIS — E10.65 TYPE 1 DIABETES MELLITUS WITH HYPERGLYCEMIA (MULTI): Primary | ICD-10-CM

## 2024-03-05 LAB — POC HEMOGLOBIN A1C: 7.2 % (ref 4.2–6.5)

## 2024-03-05 PROCEDURE — 83036 HEMOGLOBIN GLYCOSYLATED A1C: CPT | Performed by: NURSE PRACTITIONER

## 2024-03-05 PROCEDURE — 3077F SYST BP >= 140 MM HG: CPT | Performed by: NURSE PRACTITIONER

## 2024-03-05 PROCEDURE — 1036F TOBACCO NON-USER: CPT | Performed by: NURSE PRACTITIONER

## 2024-03-05 PROCEDURE — 1159F MED LIST DOCD IN RCRD: CPT | Performed by: NURSE PRACTITIONER

## 2024-03-05 PROCEDURE — 95251 CONT GLUC MNTR ANALYSIS I&R: CPT | Performed by: NURSE PRACTITIONER

## 2024-03-05 PROCEDURE — 1126F AMNT PAIN NOTED NONE PRSNT: CPT | Performed by: NURSE PRACTITIONER

## 2024-03-05 PROCEDURE — 3078F DIAST BP <80 MM HG: CPT | Performed by: NURSE PRACTITIONER

## 2024-03-05 PROCEDURE — 99214 OFFICE O/P EST MOD 30 MIN: CPT | Performed by: NURSE PRACTITIONER

## 2024-03-05 PROCEDURE — 1160F RVW MEDS BY RX/DR IN RCRD: CPT | Performed by: NURSE PRACTITIONER

## 2024-03-05 ASSESSMENT — PAIN SCALES - GENERAL: PAINLEVEL: 0-NO PAIN

## 2024-03-05 ASSESSMENT — PATIENT HEALTH QUESTIONNAIRE - PHQ9
SUM OF ALL RESPONSES TO PHQ9 QUESTIONS 1 & 2: 0
2. FEELING DOWN, DEPRESSED OR HOPELESS: NOT AT ALL
1. LITTLE INTEREST OR PLEASURE IN DOING THINGS: NOT AT ALL

## 2024-03-07 ENCOUNTER — TREATMENT (OUTPATIENT)
Dept: PHYSICAL THERAPY | Facility: CLINIC | Age: 79
End: 2024-03-07
Payer: MEDICARE

## 2024-03-07 DIAGNOSIS — H81.12 BPPV (BENIGN PAROXYSMAL POSITIONAL VERTIGO), LEFT: ICD-10-CM

## 2024-03-07 DIAGNOSIS — R26.89 IMBALANCE: ICD-10-CM

## 2024-03-07 DIAGNOSIS — Z87.898 HISTORY OF VERTIGO: ICD-10-CM

## 2024-03-07 PROCEDURE — 97112 NEUROMUSCULAR REEDUCATION: CPT | Mod: GP | Performed by: PHYSICAL THERAPIST

## 2024-03-07 NOTE — PROGRESS NOTES
HPI    Presents for follow up/metabolic management 79 yo with DM Type 1 diagnosed at age 27. History HTN, HLD, Hypothyroidism, GERD and Seizures. Current A1C 7.2% was 6.9%. Now has Guardian sensor, updated 780G. Following carb controlled diet and knows reasonable carb allowances. Patient able to afford their medications. Patient is exercising/very active.         -CGM Guardian smart guard with Mini-Medtronic 780G          -INSULIN Aspart         -HTN Losartan, Amlodipine daily at goal/not at goal med/dose         -STATIN Simvastatin LDLCALC 70  Basal  0000  0.50  0300  0.70  0600  0.60  2000  0.95  ICR    0000  26.0   0700  18.0  ISF    0000  50  TARGET  0000 100-120    Pump data 14 days, downloaded and scanned into her record. Time in target 71%, no lows. Mostly wakes up around 100-140, post prandial spikes with lunch mostly.Typically goes into bedtime under 180.       Current Outpatient Medications:     amLODIPine (Norvasc) 5 mg tablet, Take 1 tablet (5 mg) by mouth once daily., Disp: , Rfl:     blood sugar diagnostic (Accu-Chek Guide test strips) strip, USE TO TEST 10 TIMES DAILY, Disp: 900 strip, Rfl: 3    levETIRAcetam (Keppra) 750 mg tablet, Take 1 tablet (750 mg) by mouth 2 times a day., Disp: , Rfl:     LevoxyL 137 mcg tablet, Take 1 tablet (137 mcg) by mouth once daily in the morning. Take before meals., Disp: , Rfl:     losartan (Cozaar) 100 mg tablet, Take 1 tablet (100 mg) by mouth once daily., Disp: , Rfl:     oxybutynin XL (Ditropan-XL) 10 mg 24 hr tablet, Take 1 tablet (10 mg) by mouth once daily., Disp: , Rfl:     pantoprazole (ProtoNix) 40 mg EC tablet, Take 1 tablet (40 mg) by mouth once daily in the morning. Take before meals., Disp: , Rfl:     simvastatin (Zocor) 20 mg tablet, Take 1 tablet (20 mg) by mouth once daily., Disp: , Rfl:     cholecalciferol (Vitamin D-3) 50,000 unit capsule, Take 1 capsule (50,000 Units) by mouth 1 (one) time per week. (Patient not taking: Reported on 3/5/2024), Disp:  , Rfl:     insulin aspart (NovoLOG) 100 unit/mL injection, Take per insulin instructions. INSULIN PUMP, Disp: , Rfl:     meclizine (Antivert) 25 mg tablet, Take 1 tablet (25 mg) by mouth 3 times a day as needed for dizziness or nausea., Disp: , Rfl:     multivitamin tablet, Take 1 tablet by mouth once daily., Disp: , Rfl:       Allergies as of 03/05/2024 - Reviewed 03/05/2024   Allergen Reaction Noted    Sulfa (sulfonamide antibiotics) Hives 09/02/2023    Sulfamethoxazole-trimethoprim Hives 09/02/2023    Erythromycin Other 09/02/2023    Amoxicillin-pot clavulanate Diarrhea 09/02/2023         Review of Systems   Cardiology: Lightheadedness-denies.  Chest pain-denies.  Leg edema-denies.  Palpitations-denies.  Respiratory: Cough-denies. Shortness of breath-denies.  Wheezing-denies.  Gastroenterology: Constipation-denies.  Diarrhea-denies.  Heartburn-denies.  Endocrinology: Cold intolerance-denies.  Heat intolerance-denies.  Sweats-denies.  Neurology: Headache-denies.  Tremor-denies.  Neuropathy in extremities-denies.  Psychology: Low energy-denies.  Irritability-denies.  Sleep disturbances-denies.      /73 (BP Location: Left arm, Patient Position: Sitting)   Pulse 54   Wt 69.4 kg (153 lb)   BMI 25.46 kg/m²       Labs:  Lab Results   Component Value Date    WBC 4.1 (L) 01/10/2024    NRBC 0.0 01/10/2024    RBC 4.76 01/10/2024    HGB 14.4 01/10/2024    HCT 44.7 01/10/2024     01/10/2024     Lab Results   Component Value Date    CALCIUM 9.2 01/10/2024    AST 16 01/10/2024    ALKPHOS 83 01/10/2024    BILITOT 0.9 01/10/2024    PROT 6.2 01/10/2024    ALBUMIN 4.3 01/10/2024    GLOB 2.2 04/21/2023    AGR 1.8 04/21/2023     01/10/2024    K 4.6 01/10/2024     01/10/2024    CO2 28 01/10/2024    ANIONGAP 10 01/10/2024    BUN 18 01/10/2024    CREATININE 0.80 01/10/2024    UREACREAUR 20.0 04/21/2023    GLUCOSE 214 (H) 01/10/2024    ALT 15 01/10/2024    EGFR 76 01/10/2024     Lab Results   Component Value  Date    CHOL 164 01/10/2024    TRIG 65 01/10/2024    HDL 81.0 01/10/2024    LDLCALC 70 01/10/2024     Lab Results   Component Value Date    MICROALBCREA 8.9 04/21/2023     Lab Results   Component Value Date    TSH 2.19 01/10/2024     Lab Results   Component Value Date    [id number] 404 01/09/2023     Lab Results   Component Value Date    HGBA1C 7.2 (A) 03/05/2024         Physical Exam   General Appearance: pleasant, cooperative, no acute distress  HEENT: no chemosis, no proptosis, no lid lag, no lid retraction  Neck: no lymphadenopathy, no thyromegaly, no dominant thyroid nodules  Heart: no murmurs, regular rate and rhythm, S1 and S2  Lungs: no wheezes, no rhonci, no rales  Extremities: no lower extremity swelling      Assessment/Plan   1. Type 1 diabetes mellitus with hyperglycemia (CMS/HCC)  -recent pump update, now using Guardian Sensor  -slight increase in A1C which is bothersome to her  -agreed to meet with PharmD educator next   - very helpful with assisting to manage pump since she is new to the update and having a sensor  -no longer checking BS 10 times a day    - POCT glycosylated hemoglobin (Hb A1C) manually resulted    2. Mixed hyperlipidemia  -tolerates statin    3. Essential hypertension  -stable    4. Hypothyroidism due to Hashimoto's thyroiditis  -euthyroid    5. Insulin pump in place  -Mini-Medtronic 780G     Follow Up: 3 months Agnieszka    -labs/tests/notes reviewed  -reviewed and counseled patient on medication monitoring and side effects    Medical Decision Making  Complexity of problem: Chronic illness of diabetes mellitus uncontrolled, progressing  Data analyzed and reviewed: Reviewed prior notes, blood glucose data, labs including HgbA1c, lipids, serum chemistries.  Ordered tests.   Risk of complications and morbidities: Is definite because of use of insulin and risk of hypoglycemia.  Prescription medications reviewed and modifications made.  Compliance assessed.  Addressed social  determinants of health including food insecurity.

## 2024-03-07 NOTE — PROGRESS NOTES
"Physical Therapy Treatment    Patient Name: Pat Jaimes  MRN: [id number]  Encounter date:  3/7/2024  Time Calculation  Start Time: 1128  Stop Time: 1208  Time Calculation (min): 40 min     PT Therapeutic Procedures Time Entry  Neuromuscular Re-Education Time Entry: 40    Visit Number:  2 (including evaluation)  Planned total visits: 10  Visit Authorized:  MEDICARE A&B, 240 DED, 80% [place], [place], [employer]    Current Problem  1. History of vertigo  Follow Up In Physical Therapy      2. BPPV (benign paroxysmal positional vertigo), left  Follow Up In Physical Therapy      3. Imbalance  Follow Up In Physical Therapy          Surgery  Remote craniotomy     Precautions      Fall risk due to vertigo, DM.  Meningioma on brain, Sz hx.   Pain/dizzy/nausea:   5/0/0 now     Location knees  Description ache  Subjective  General        Progress made, no more vertigo, got MRI.    Objective  - H smooth pursuits  - B [place]-Hallpike  Holds onto spouse at times with ambulation    Treatment:     Seated on plinth:  VOR H AND V, 2 X 30\" ea  Saccades V, H and D DNP  Smooth pursuits V, H  30\" ea  Morris string 60\"  Busy pattern-DNP  DVA-DNP  VOR cancellation to metronome at 50 bps    Stand on foam:  EO 60\"  EC 60\"  V and H HT's x10 ea  Heel/toe rocks x 20  March x 20    Stand on wedge to control retropulsion: dnp    Activity tolerance:  Improving     OP EDUCATION:   Schedule more visits    Assessment:     Pt's response to treatment:  pt tolerates all new TE's without pain incr.  Areas of improvements:  vertigo  Limitations/deficits:  sensory integration    Pain end of session:  NO INCR, no D/N    Plan:     Continue with current POC with the following changes balance focus  Continued PT required to reach all goals and restore function  Assessment of current progress against goals:  Progressing toward functional goals    Goals:      STG:   Pt to be I in initial HEP.     LTG\"s:  - BPPV testing  Improve score on outcome form by 10 " points to show incr in function.  Decr max dizziness level to 2/10 for community activity.  MCTSIB 4/4 for incr sensory integration.

## 2024-03-12 ENCOUNTER — TREATMENT (OUTPATIENT)
Dept: PHYSICAL THERAPY | Facility: CLINIC | Age: 79
End: 2024-03-12
Payer: MEDICARE

## 2024-03-12 DIAGNOSIS — R26.89 IMBALANCE: ICD-10-CM

## 2024-03-12 DIAGNOSIS — H81.12 BPPV (BENIGN PAROXYSMAL POSITIONAL VERTIGO), LEFT: ICD-10-CM

## 2024-03-12 DIAGNOSIS — Z87.898 HISTORY OF VERTIGO: ICD-10-CM

## 2024-03-12 PROCEDURE — 97112 NEUROMUSCULAR REEDUCATION: CPT | Mod: GP | Performed by: PHYSICAL THERAPIST

## 2024-03-12 NOTE — PROGRESS NOTES
"Physical Therapy Treatment    Patient Name: Pat Jaimes  MRN: [id number]  Encounter date:  3/12/2024  Time Calculation  Start Time: 1145  Stop Time: 1225  Time Calculation (min): 40 min     PT Therapeutic Procedures Time Entry  Neuromuscular Re-Education Time Entry: 40    Visit Number:  3 (including evaluation)  Planned total visits: 10  Visit Authorized:  MEDICARE A&B, 240 DED, 80% [place], [employer]    Current Problem  1. History of vertigo  Follow Up In Physical Therapy      2. BPPV (benign paroxysmal positional vertigo), left  Follow Up In Physical Therapy      3. Imbalance  Follow Up In Physical Therapy          Surgery  Remote craniotomy     Precautions      Fall risk due to vertigo, DM.  Meningioma on brain, Sz hx.   Pain/dizzy/nausea:   4/0/0 now     Location knees  Description ache  Subjective  General        No more vertigo, got MRI-states MRI was OK.  Pt and spouse feel very comfortable coming here and are happy with her progress.  Pt may want to go to an ortho for her knees and hips- provided references.    Objective    - B Roll tests  - [place]-Hallpike   Mild + HIT to L    Prior:  - H smooth pursuits  - B [place]-Hallpike    Holds onto spouse at times with ambulation    Treatment:     Seated on plinth:  VOR H AND V, 2 X 30\" ea  Saccades V, H and D x 10 ea  Smooth pursuits V, H , D ea  [place]ris string 60\" DNP  Busy pattern-DNP  DVA-DNP  VOR cancellation to metronome at 50 bps DNP    Gaze stabilization with:  Gait 75' V and H HT's. Varying speeds, start/stop    DNP:  Stand on foam:  EO 60\"  EC 60\"  V and H HT's x10 ea  Heel/toe rocks x 20  March x 20    Stand on wedge to control retropulsion: dnp    Activity tolerance:  Improving     OP EDUCATION:   Schedule more visits    Assessment:     Pt's response to treatment:  pt tolerates all new TE's without pain incr.  Areas of improvements:  vertigo  Limitations/deficits:  sensory integration    Pain end of session:  NO INCR, no D/N    Plan:   " "  Continue with current POC with the following changes balance and gaze stabilization focus  Continued PT required to reach all goals and restore function  Assessment of current progress against goals:  Progressing toward functional goals    Goals:      STG:   Pt to be I in initial HEP.     LTG\"s:  - BPPV testing  Improve score on outcome form by 10 points to show incr in function.  Decr max dizziness level to 2/10 for community activity.  MCTSIB 4/4 for incr sensory integration.         "

## 2024-03-14 ENCOUNTER — TREATMENT (OUTPATIENT)
Dept: PHYSICAL THERAPY | Facility: CLINIC | Age: 79
End: 2024-03-14
Payer: MEDICARE

## 2024-03-14 DIAGNOSIS — R26.89 IMBALANCE: ICD-10-CM

## 2024-03-14 DIAGNOSIS — E10.69 TYPE 1 DIABETES MELLITUS WITH OTHER SPECIFIED COMPLICATION (MULTI): ICD-10-CM

## 2024-03-14 DIAGNOSIS — Z87.898 HISTORY OF VERTIGO: ICD-10-CM

## 2024-03-14 DIAGNOSIS — H81.12 BPPV (BENIGN PAROXYSMAL POSITIONAL VERTIGO), LEFT: ICD-10-CM

## 2024-03-14 PROCEDURE — 97112 NEUROMUSCULAR REEDUCATION: CPT | Mod: GP | Performed by: PHYSICAL THERAPIST

## 2024-03-14 PROCEDURE — 97110 THERAPEUTIC EXERCISES: CPT | Mod: GP | Performed by: PHYSICAL THERAPIST

## 2024-03-14 RX ORDER — INSULIN ASPART INJECTION 100 [IU]/ML
INJECTION, SOLUTION SUBCUTANEOUS
Qty: 90 ML | Refills: 3 | Status: SHIPPED | OUTPATIENT
Start: 2024-03-14

## 2024-03-14 NOTE — TELEPHONE ENCOUNTER
Pharmacy said Medicare needs new Rx for her insulin the insulin aspart not on formulary  the fiasp is

## 2024-03-14 NOTE — PROGRESS NOTES
"Physical Therapy Treatment    Patient Name: Pat Jaimes  MRN: [id number]  Encounter date:  3/14/2024  Time Calculation  Start Time: 1132  Stop Time: 1210  Time Calculation (min): 38 min     PT Therapeutic Procedures Time Entry  Neuromuscular Re-Education Time Entry: 33  Therapeutic Exercise Time Entry: 5    Visit Number:  4 (including evaluation)  Planned total visits: 10  Visit Authorized:  MEDICARE A&B, 240 DED, 80% [place], [employer]    Current Problem  1. History of vertigo  Follow Up In Physical Therapy      2. BPPV (benign paroxysmal positional vertigo), left  Follow Up In Physical Therapy      3. Imbalance  Follow Up In Physical Therapy          Surgery  Remote craniotomy     Precautions      Fall risk due to vertigo, DM.  Meningioma on brain, Sz hx.   Pain/dizzy/nausea:   0/0/0 now     Location knees  Description ache  Subjective  General      Doing very well.    Objective    - B Roll tests  - [place]-Hallpike   Mild + HIT to L    Prior:  - H smooth pursuits  - B [place]-Hallpike    Holds onto spouse at times with ambulation    Treatment:        Rec bike lv 4, 5'  Gaze stabilization with:  Gait in bars V and H HT's, vor, backward, F, s/s  [place] turns 2 x 2 ea    Stand on foam:  EO 60\"  EC 60\"  V and H HT's x10 ea  Convergence x 15, and also with VOR  Heel/toe rocks x 20  March x 20    Stand on wedge to control retropulsion:     Seated on plinth:  VOR H AND V, 2 X 30\" ea  Saccades V, H and D x 10 ea  Smooth pursuits V, H , D ea  Morris string 60\" DNP  Busy pattern-DNP  DVA-DNP  VOR cancellation to metronome at 50 bps DNP    Activity tolerance:  Improving     OP EDUCATION:   Use rocking chair and rec bike (start 5') at home  diaphragmatic breathing  HEP:    Assessment:     Pt's response to treatment:  pt tolerates all new TE's without pain incr.  Areas of improvements:  vertigo  Limitations/deficits:  sensory integration    Pain end of session:  NO INCR, no D/N    Plan:     Continue with current POC " "with the following changes balance and gaze stabilization focus  Continued PT required to reach all goals and restore function  Assessment of current progress against goals:  Progressing toward functional goals    Goals:      STG:   Pt to be I in initial HEP.     LTG\"s:  - BPPV testing  Improve score on outcome form by 10 points to show incr in function.  Decr max dizziness level to 2/10 for community activity.  MCTSIB 4/4 for incr sensory integration.         "

## 2024-03-19 ENCOUNTER — APPOINTMENT (OUTPATIENT)
Dept: ENDOCRINOLOGY | Facility: CLINIC | Age: 79
End: 2024-03-19
Payer: MEDICARE

## 2024-03-19 ENCOUNTER — TREATMENT (OUTPATIENT)
Dept: PHYSICAL THERAPY | Facility: CLINIC | Age: 79
End: 2024-03-19
Payer: MEDICARE

## 2024-03-19 DIAGNOSIS — H81.12 BPPV (BENIGN PAROXYSMAL POSITIONAL VERTIGO), LEFT: ICD-10-CM

## 2024-03-19 DIAGNOSIS — R26.89 IMBALANCE: ICD-10-CM

## 2024-03-19 DIAGNOSIS — Z87.898 HISTORY OF VERTIGO: ICD-10-CM

## 2024-03-19 PROCEDURE — 97112 NEUROMUSCULAR REEDUCATION: CPT | Mod: GP | Performed by: PHYSICAL THERAPIST

## 2024-03-19 PROCEDURE — 97110 THERAPEUTIC EXERCISES: CPT | Mod: GP | Performed by: PHYSICAL THERAPIST

## 2024-03-19 NOTE — PROGRESS NOTES
"Physical Therapy Treatment    Patient Name: Pat Jaimes  MRN: [id number]  Encounter date:  3/19/2024  Time Calculation  Start Time: 0845  Stop Time: 0923  Time Calculation (min): 38 min     PT Therapeutic Procedures Time Entry  Neuromuscular Re-Education Time Entry: 32  Therapeutic Exercise Time Entry: 6    Visit Number:  5 (including evaluation)  Planned total visits: 10  Visit Authorized:  MEDICARE A&B, 240 DED, 80% [place], [employer]    Current Problem  1. History of vertigo  Follow Up In Physical Therapy      2. BPPV (benign paroxysmal positional vertigo), left  Follow Up In Physical Therapy      3. Imbalance  Follow Up In Physical Therapy          Surgery  Remote craniotomy     Precautions      Fall risk due to vertigo, DM.  Meningioma on brain, Sz hx.     Pain/dizzy/nausea:   0/0/0 now     Location knees  Description ache  Subjective  General     No more dizziness, but of balance at times    Objective  MCTSIB 3/4    Prior:  - B Roll tests  - [place]-Hallpike   Mild + HIT to L  - H smooth pursuits  - B [place]-Hallpike    Treatment:     Seated:    Baps lv 2 df/pf, inv/ev 2 x 20 ea  Rec bike lv 4, 6'  Gaze stabilization with:  Gait in bars V and H HT's, vor, backward, F, s/s  [place] turns 2 x 2 ea  STS 2 x 10  VOR cancellation to metronome at 50 bps     Stand on foam:  EO 60\"  EC 60\"  V and H HT's x10 ea  Convergence x 15, and also with VOR  F and lat step ups x 10 ea on foam      Stand on wedge to control retropulsion:  EO, EC 1' ea, V and H Ht's x 10 ea    Seated on plinth:  VOR H AND V, 2 X 30\" ea  Saccades V, H and D x 10 ea  Heel/toe rocks x 20  March x 20  Morris string 60\" DNP  Busy pattern-DNP  DVA-DNP  VOR cancellation to metronome at 50 bps     Activity tolerance:  good    OP EDUCATION:  Continue prior HEP:   Use rocking chair and rec bike (start 5') at home  diaphragmatic breathing  HEP:    Assessment:     Pt's response to treatment:  pt tolerates all new TE's without pain incr.  Areas of " "improvements:  vertigo, pt no longer seen holding onto spouse with ambulation  Limitations/deficits:  sensory integration    Pain end of session:  NO INCR, no D/N    Plan:     Continue with current POC with the following changes balance and gaze stabilization focus  Continued PT required to reach all goals and restore function  Assessment of current progress against goals:  Progressing toward functional goals    Goals:      STG:   Pt to be I in initial HEP.     LTG\"s:  - BPPV testing  Improve score on outcome form by 10 points to show incr in function.  Decr max dizziness level to 2/10 for community activity.  MCTSIB 4/4 for incr sensory integration.         "

## 2024-03-20 ENCOUNTER — HOSPITAL ENCOUNTER (OUTPATIENT)
Facility: HOSPITAL | Age: 79
Setting detail: OBSERVATION
Discharge: HOME | End: 2024-03-21
Attending: EMERGENCY MEDICINE | Admitting: INTERNAL MEDICINE
Payer: MEDICARE

## 2024-03-20 ENCOUNTER — APPOINTMENT (OUTPATIENT)
Dept: RADIOLOGY | Facility: HOSPITAL | Age: 79
End: 2024-03-20
Payer: MEDICARE

## 2024-03-20 ENCOUNTER — APPOINTMENT (OUTPATIENT)
Dept: CARDIOLOGY | Facility: HOSPITAL | Age: 79
End: 2024-03-20
Payer: MEDICARE

## 2024-03-20 DIAGNOSIS — E10.9 TYPE 1 DIABETES MELLITUS WITHOUT COMPLICATION (MULTI): ICD-10-CM

## 2024-03-20 DIAGNOSIS — R55 SYNCOPE, UNSPECIFIED SYNCOPE TYPE: Primary | ICD-10-CM

## 2024-03-20 DIAGNOSIS — R55 SYNCOPE AND COLLAPSE: ICD-10-CM

## 2024-03-20 LAB
ALBUMIN SERPL-MCNC: 4.2 G/DL (ref 3.5–5)
ALP BLD-CCNC: 88 U/L (ref 35–125)
ALT SERPL-CCNC: 19 U/L (ref 5–40)
ANION GAP SERPL CALC-SCNC: 10 MMOL/L
AORTIC VALVE MEAN GRADIENT: 2.8 MMHG
AORTIC VALVE PEAK VELOCITY: 1.19 M/S
AST SERPL-CCNC: 20 U/L (ref 5–40)
AV PEAK GRADIENT: 5.6 MMHG
AVA (PEAK VEL): 2.56 CM2
AVA (VTI): 2.39 CM2
BASOPHILS # BLD AUTO: 0.03 X10*3/UL (ref 0–0.1)
BASOPHILS NFR BLD AUTO: 0.6 %
BILIRUB SERPL-MCNC: 0.9 MG/DL (ref 0.1–1.2)
BUN SERPL-MCNC: 14 MG/DL (ref 8–25)
CALCIUM SERPL-MCNC: 9.1 MG/DL (ref 8.5–10.4)
CHLORIDE SERPL-SCNC: 105 MMOL/L (ref 97–107)
CO2 SERPL-SCNC: 26 MMOL/L (ref 24–31)
CREAT SERPL-MCNC: 0.7 MG/DL (ref 0.4–1.6)
EGFRCR SERPLBLD CKD-EPI 2021: 90 ML/MIN/1.73M*2
EJECTION FRACTION APICAL 4 CHAMBER: 57
EJECTION FRACTION: 62 %
EOSINOPHIL # BLD AUTO: 0.06 X10*3/UL (ref 0–0.4)
EOSINOPHIL NFR BLD AUTO: 1.3 %
ERYTHROCYTE [DISTWIDTH] IN BLOOD BY AUTOMATED COUNT: 12.4 % (ref 11.5–14.5)
GLUCOSE BLD MANUAL STRIP-MCNC: 205 MG/DL (ref 74–99)
GLUCOSE SERPL-MCNC: 198 MG/DL (ref 65–99)
HCT VFR BLD AUTO: 44.7 % (ref 36–46)
HGB BLD-MCNC: 14.7 G/DL (ref 12–16)
IMM GRANULOCYTES # BLD AUTO: 0.02 X10*3/UL (ref 0–0.5)
IMM GRANULOCYTES NFR BLD AUTO: 0.4 % (ref 0–0.9)
LEFT ATRIUM VOLUME AREA LENGTH INDEX BSA: 21.3 ML/M2
LEFT VENTRICLE INTERNAL DIMENSION DIASTOLE: 4.05 CM (ref 3.5–6)
LEFT VENTRICULAR OUTFLOW TRACT DIAMETER: 2.08 CM
LYMPHOCYTES # BLD AUTO: 1.18 X10*3/UL (ref 0.8–3)
LYMPHOCYTES NFR BLD AUTO: 24.7 %
MAGNESIUM SERPL-MCNC: 2.1 MG/DL (ref 1.6–3.1)
MCH RBC QN AUTO: 30.4 PG (ref 26–34)
MCHC RBC AUTO-ENTMCNC: 32.9 G/DL (ref 32–36)
MCV RBC AUTO: 93 FL (ref 80–100)
MITRAL VALVE E/A RATIO: 0.67
MITRAL VALVE E/E' RATIO: 11.22
MONOCYTES # BLD AUTO: 0.3 X10*3/UL (ref 0.05–0.8)
MONOCYTES NFR BLD AUTO: 6.3 %
NEUTROPHILS # BLD AUTO: 3.18 X10*3/UL (ref 1.6–5.5)
NEUTROPHILS NFR BLD AUTO: 66.7 %
NRBC BLD-RTO: 0 /100 WBCS (ref 0–0)
PLATELET # BLD AUTO: 223 X10*3/UL (ref 150–450)
POTASSIUM SERPL-SCNC: 3.8 MMOL/L (ref 3.4–5.1)
PROT SERPL-MCNC: 6.9 G/DL (ref 5.9–7.9)
RBC # BLD AUTO: 4.83 X10*6/UL (ref 4–5.2)
RIGHT VENTRICLE FREE WALL PEAK S': 16.92 CM/S
RIGHT VENTRICLE PEAK SYSTOLIC PRESSURE: 29.1 MMHG
SODIUM SERPL-SCNC: 141 MMOL/L (ref 133–145)
TRICUSPID ANNULAR PLANE SYSTOLIC EXCURSION: 1.9 CM
TROPONIN T SERPL-MCNC: 10 NG/L
TROPONIN T SERPL-MCNC: 11 NG/L
TROPONIN T SERPL-MCNC: 9 NG/L
WBC # BLD AUTO: 4.8 X10*3/UL (ref 4.4–11.3)

## 2024-03-20 PROCEDURE — 71046 X-RAY EXAM CHEST 2 VIEWS: CPT

## 2024-03-20 PROCEDURE — 2500000004 HC RX 250 GENERAL PHARMACY W/ HCPCS (ALT 636 FOR OP/ED): Performed by: NURSE PRACTITIONER

## 2024-03-20 PROCEDURE — 70450 CT HEAD/BRAIN W/O DYE: CPT

## 2024-03-20 PROCEDURE — 71046 X-RAY EXAM CHEST 2 VIEWS: CPT | Performed by: RADIOLOGY

## 2024-03-20 PROCEDURE — 93005 ELECTROCARDIOGRAM TRACING: CPT

## 2024-03-20 PROCEDURE — 84484 ASSAY OF TROPONIN QUANT: CPT

## 2024-03-20 PROCEDURE — 93880 EXTRACRANIAL BILAT STUDY: CPT | Performed by: INTERNAL MEDICINE

## 2024-03-20 PROCEDURE — 83735 ASSAY OF MAGNESIUM: CPT

## 2024-03-20 PROCEDURE — G0378 HOSPITAL OBSERVATION PER HR: HCPCS

## 2024-03-20 PROCEDURE — 82947 ASSAY GLUCOSE BLOOD QUANT: CPT

## 2024-03-20 PROCEDURE — 96372 THER/PROPH/DIAG INJ SC/IM: CPT

## 2024-03-20 PROCEDURE — 70450 CT HEAD/BRAIN W/O DYE: CPT | Performed by: RADIOLOGY

## 2024-03-20 PROCEDURE — 2500000001 HC RX 250 WO HCPCS SELF ADMINISTERED DRUGS (ALT 637 FOR MEDICARE OP): Performed by: NURSE PRACTITIONER

## 2024-03-20 PROCEDURE — 93306 TTE W/DOPPLER COMPLETE: CPT | Performed by: INTERNAL MEDICINE

## 2024-03-20 PROCEDURE — 96372 THER/PROPH/DIAG INJ SC/IM: CPT | Performed by: NURSE PRACTITIONER

## 2024-03-20 PROCEDURE — 36415 COLL VENOUS BLD VENIPUNCTURE: CPT

## 2024-03-20 PROCEDURE — 73110 X-RAY EXAM OF WRIST: CPT | Mod: LEFT SIDE | Performed by: RADIOLOGY

## 2024-03-20 PROCEDURE — 73110 X-RAY EXAM OF WRIST: CPT | Mod: LT

## 2024-03-20 PROCEDURE — 99285 EMERGENCY DEPT VISIT HI MDM: CPT | Mod: 25

## 2024-03-20 PROCEDURE — 80053 COMPREHEN METABOLIC PANEL: CPT

## 2024-03-20 PROCEDURE — 93306 TTE W/DOPPLER COMPLETE: CPT

## 2024-03-20 PROCEDURE — 72125 CT NECK SPINE W/O DYE: CPT

## 2024-03-20 PROCEDURE — 99222 1ST HOSP IP/OBS MODERATE 55: CPT | Performed by: NURSE PRACTITIONER

## 2024-03-20 PROCEDURE — 85025 COMPLETE CBC W/AUTO DIFF WBC: CPT

## 2024-03-20 PROCEDURE — 72125 CT NECK SPINE W/O DYE: CPT | Performed by: RADIOLOGY

## 2024-03-20 PROCEDURE — 2500000002 HC RX 250 W HCPCS SELF ADMINISTERED DRUGS (ALT 637 FOR MEDICARE OP, ALT 636 FOR OP/ED): Mod: MUE | Performed by: NURSE PRACTITIONER

## 2024-03-20 PROCEDURE — 93880 EXTRACRANIAL BILAT STUDY: CPT

## 2024-03-20 RX ORDER — ONDANSETRON 4 MG/1
4 TABLET, FILM COATED ORAL 4 TIMES DAILY PRN
Status: DISCONTINUED | OUTPATIENT
Start: 2024-03-20 | End: 2024-03-21 | Stop reason: HOSPADM

## 2024-03-20 RX ORDER — AMLODIPINE BESYLATE 5 MG/1
5 TABLET ORAL DAILY
Status: DISCONTINUED | OUTPATIENT
Start: 2024-03-21 | End: 2024-03-21 | Stop reason: HOSPADM

## 2024-03-20 RX ORDER — DEXTROSE 50 % IN WATER (D50W) INTRAVENOUS SYRINGE
25
Status: DISCONTINUED | OUTPATIENT
Start: 2024-03-20 | End: 2024-03-21 | Stop reason: HOSPADM

## 2024-03-20 RX ORDER — OXYBUTYNIN CHLORIDE 5 MG/1
5 TABLET ORAL 2 TIMES DAILY
Status: DISCONTINUED | OUTPATIENT
Start: 2024-03-20 | End: 2024-03-21 | Stop reason: HOSPADM

## 2024-03-20 RX ORDER — ACETAMINOPHEN 160 MG/5ML
650 SOLUTION ORAL EVERY 4 HOURS PRN
Status: DISCONTINUED | OUTPATIENT
Start: 2024-03-20 | End: 2024-03-21 | Stop reason: HOSPADM

## 2024-03-20 RX ORDER — ACETAMINOPHEN 325 MG/1
650 TABLET ORAL EVERY 4 HOURS PRN
Status: DISCONTINUED | OUTPATIENT
Start: 2024-03-20 | End: 2024-03-21 | Stop reason: HOSPADM

## 2024-03-20 RX ORDER — LOSARTAN POTASSIUM 100 MG/1
100 TABLET ORAL DAILY
Status: DISCONTINUED | OUTPATIENT
Start: 2024-03-20 | End: 2024-03-21 | Stop reason: HOSPADM

## 2024-03-20 RX ORDER — ACETAMINOPHEN 650 MG/1
650 SUPPOSITORY RECTAL EVERY 4 HOURS PRN
Status: DISCONTINUED | OUTPATIENT
Start: 2024-03-20 | End: 2024-03-21 | Stop reason: HOSPADM

## 2024-03-20 RX ORDER — HEPARIN SODIUM 5000 [USP'U]/ML
5000 INJECTION, SOLUTION INTRAVENOUS; SUBCUTANEOUS EVERY 8 HOURS SCHEDULED
Status: DISCONTINUED | OUTPATIENT
Start: 2024-03-20 | End: 2024-03-21 | Stop reason: HOSPADM

## 2024-03-20 RX ORDER — ACETAMINOPHEN 500 MG
TABLET ORAL EVERY 6 HOURS PRN
COMMUNITY
End: 2024-03-28 | Stop reason: WASHOUT

## 2024-03-20 RX ORDER — DEXTROSE 50 % IN WATER (D50W) INTRAVENOUS SYRINGE
12.5
Status: DISCONTINUED | OUTPATIENT
Start: 2024-03-20 | End: 2024-03-21 | Stop reason: HOSPADM

## 2024-03-20 RX ORDER — SIMVASTATIN 20 MG/1
20 TABLET, FILM COATED ORAL NIGHTLY
Status: DISCONTINUED | OUTPATIENT
Start: 2024-03-20 | End: 2024-03-21 | Stop reason: HOSPADM

## 2024-03-20 RX ORDER — PANTOPRAZOLE SODIUM 40 MG/1
40 TABLET, DELAYED RELEASE ORAL
Status: DISCONTINUED | OUTPATIENT
Start: 2024-03-21 | End: 2024-03-21 | Stop reason: HOSPADM

## 2024-03-20 RX ORDER — LEVETIRACETAM 750 MG/1
750 TABLET ORAL 2 TIMES DAILY
Status: DISCONTINUED | OUTPATIENT
Start: 2024-03-20 | End: 2024-03-21 | Stop reason: HOSPADM

## 2024-03-20 RX ORDER — ONDANSETRON HYDROCHLORIDE 2 MG/ML
4 INJECTION, SOLUTION INTRAVENOUS 4 TIMES DAILY PRN
Status: DISCONTINUED | OUTPATIENT
Start: 2024-03-20 | End: 2024-03-21 | Stop reason: HOSPADM

## 2024-03-20 RX ADMIN — HEPARIN SODIUM 5000 UNITS: 5000 INJECTION, SOLUTION INTRAVENOUS; SUBCUTANEOUS at 15:22

## 2024-03-20 RX ADMIN — LEVETIRACETAM 750 MG: 750 TABLET, FILM COATED ORAL at 20:15

## 2024-03-20 RX ADMIN — HEPARIN SODIUM 5000 UNITS: 5000 INJECTION, SOLUTION INTRAVENOUS; SUBCUTANEOUS at 21:27

## 2024-03-20 RX ADMIN — LOSARTAN POTASSIUM 100 MG: 100 TABLET, FILM COATED ORAL at 15:22

## 2024-03-20 RX ADMIN — SIMVASTATIN 20 MG: 20 TABLET, FILM COATED ORAL at 20:15

## 2024-03-20 SDOH — ECONOMIC STABILITY: FOOD INSECURITY: WITHIN THE PAST 12 MONTHS, THE FOOD YOU BOUGHT JUST DIDN'T LAST AND YOU DIDN'T HAVE MONEY TO GET MORE.: NEVER TRUE

## 2024-03-20 SDOH — SOCIAL STABILITY: SOCIAL INSECURITY
WITHIN THE LAST YEAR, HAVE YOU BEEN KICKED, HIT, SLAPPED, OR OTHERWISE PHYSICALLY HURT BY YOUR PARTNER OR EX-PARTNER?: NO

## 2024-03-20 SDOH — SOCIAL STABILITY: SOCIAL NETWORK
IN A TYPICAL WEEK, HOW MANY TIMES DO YOU TALK ON THE PHONE WITH FAMILY, FRIENDS, OR NEIGHBORS?: MORE THAN THREE TIMES A WEEK

## 2024-03-20 SDOH — ECONOMIC STABILITY: INCOME INSECURITY: IN THE PAST 12 MONTHS HAS THE ELECTRIC, GAS, OIL, OR WATER COMPANY THREATENED TO SHUT OFF SERVICES IN YOUR HOME?: NO

## 2024-03-20 SDOH — ECONOMIC STABILITY: INCOME INSECURITY: HOW HARD IS IT FOR YOU TO PAY FOR THE VERY BASICS LIKE FOOD, HOUSING, MEDICAL CARE, AND HEATING?: NOT HARD AT ALL

## 2024-03-20 SDOH — ECONOMIC STABILITY: HOUSING INSECURITY
IN THE LAST 12 MONTHS, WAS THERE A TIME WHEN YOU DID NOT HAVE A STEADY PLACE TO SLEEP OR SLEPT IN A SHELTER (INCLUDING NOW)?: NO

## 2024-03-20 SDOH — SOCIAL STABILITY: SOCIAL INSECURITY
WITHIN THE LAST YEAR, HAVE YOU BEEN RAPED OR FORCED TO HAVE ANY KIND OF SEXUAL ACTIVITY BY YOUR PARTNER OR EX-PARTNER?: NO

## 2024-03-20 SDOH — ECONOMIC STABILITY: INCOME INSECURITY: IN THE PAST 12 MONTHS, HAS THE ELECTRIC, GAS, OIL, OR WATER COMPANY THREATENED TO SHUT OFF SERVICE IN YOUR HOME?: NO

## 2024-03-20 SDOH — HEALTH STABILITY: MENTAL HEALTH: HOW OFTEN DO YOU HAVE A DRINK CONTAINING ALCOHOL?: 4 OR MORE TIMES A WEEK

## 2024-03-20 SDOH — SOCIAL STABILITY: SOCIAL INSECURITY: DO YOU FEEL ANYONE HAS EXPLOITED OR TAKEN ADVANTAGE OF YOU FINANCIALLY OR OF YOUR PERSONAL PROPERTY?: NO

## 2024-03-20 SDOH — ECONOMIC STABILITY: FOOD INSECURITY: WITHIN THE PAST 12 MONTHS, YOU WORRIED THAT YOUR FOOD WOULD RUN OUT BEFORE YOU GOT THE MONEY TO BUY MORE.: NEVER TRUE

## 2024-03-20 SDOH — ECONOMIC STABILITY: FOOD INSECURITY: WITHIN THE PAST 12 MONTHS, YOU WORRIED THAT YOUR FOOD WOULD RUN OUT BEFORE YOU GOT MONEY TO BUY MORE.: NEVER TRUE

## 2024-03-20 SDOH — SOCIAL STABILITY: SOCIAL INSECURITY: ARE YOU MARRIED, WIDOWED, DIVORCED, SEPARATED, NEVER MARRIED, OR LIVING WITH A PARTNER?: DIVORCED

## 2024-03-20 SDOH — SOCIAL STABILITY: SOCIAL NETWORK: ARE YOU MARRIED, WIDOWED, DIVORCED, SEPARATED, NEVER MARRIED, OR LIVING WITH A PARTNER?: DIVORCED

## 2024-03-20 SDOH — SOCIAL STABILITY: SOCIAL INSECURITY: WITHIN THE LAST YEAR, HAVE YOU BEEN AFRAID OF YOUR PARTNER OR EX-PARTNER?: NO

## 2024-03-20 SDOH — SOCIAL STABILITY: SOCIAL INSECURITY: ABUSE: ADULT

## 2024-03-20 SDOH — SOCIAL STABILITY: SOCIAL INSECURITY: WITHIN THE LAST YEAR, HAVE YOU BEEN HUMILIATED OR EMOTIONALLY ABUSED IN OTHER WAYS BY YOUR PARTNER OR EX-PARTNER?: NO

## 2024-03-20 SDOH — SOCIAL STABILITY: SOCIAL INSECURITY: ARE THERE ANY APPARENT SIGNS OF INJURIES/BEHAVIORS THAT COULD BE RELATED TO ABUSE/NEGLECT?: NO

## 2024-03-20 SDOH — HEALTH STABILITY: MENTAL HEALTH: HOW MANY STANDARD DRINKS CONTAINING ALCOHOL DO YOU HAVE ON A TYPICAL DAY?: 1 OR 2

## 2024-03-20 SDOH — HEALTH STABILITY: MENTAL HEALTH: HOW MANY DRINKS CONTAINING ALCOHOL DO YOU HAVE ON A TYPICAL DAY WHEN YOU ARE DRINKING?: 1 OR 2

## 2024-03-20 SDOH — ECONOMIC STABILITY: TRANSPORTATION INSECURITY: IN THE PAST 12 MONTHS, HAS LACK OF TRANSPORTATION KEPT YOU FROM MEDICAL APPOINTMENTS OR FROM GETTING MEDICATIONS?: NO

## 2024-03-20 SDOH — HEALTH STABILITY: MENTAL HEALTH: HOW OFTEN DO YOU HAVE 6 OR MORE DRINKS ON ONE OCCASION?: NEVER

## 2024-03-20 SDOH — HEALTH STABILITY: MENTAL HEALTH: HOW OFTEN DO YOU HAVE SIX OR MORE DRINKS ON ONE OCCASION?: NEVER

## 2024-03-20 SDOH — SOCIAL STABILITY: SOCIAL INSECURITY: HAVE YOU HAD THOUGHTS OF HARMING ANYONE ELSE?: NO

## 2024-03-20 SDOH — SOCIAL STABILITY: SOCIAL NETWORK
DO YOU BELONG TO ANY CLUBS OR ORGANIZATIONS SUCH AS CHURCH GROUPS UNIONS, FRATERNAL OR ATHLETIC GROUPS, OR SCHOOL GROUPS?: NO

## 2024-03-20 SDOH — HEALTH STABILITY: MENTAL HEALTH
STRESS IS WHEN SOMEONE FEELS TENSE, NERVOUS, ANXIOUS, OR CAN'T SLEEP AT NIGHT BECAUSE THEIR MIND IS TROUBLED. HOW STRESSED ARE YOU?: NOT AT ALL

## 2024-03-20 SDOH — SOCIAL STABILITY: SOCIAL INSECURITY: DO YOU FEEL UNSAFE GOING BACK TO THE PLACE WHERE YOU ARE LIVING?: NO

## 2024-03-20 SDOH — ECONOMIC STABILITY: INCOME INSECURITY: IN THE LAST 12 MONTHS, WAS THERE A TIME WHEN YOU WERE NOT ABLE TO PAY THE MORTGAGE OR RENT ON TIME?: NO

## 2024-03-20 SDOH — SOCIAL STABILITY: SOCIAL NETWORK: HOW OFTEN DO YOU ATTEND CHURCH OR RELIGIOUS SERVICES?: NEVER

## 2024-03-20 SDOH — SOCIAL STABILITY: SOCIAL NETWORK
DO YOU BELONG TO ANY CLUBS OR ORGANIZATIONS SUCH AS CHURCH GROUPS, UNIONS, FRATERNAL OR ATHLETIC GROUPS, OR SCHOOL GROUPS?: NO

## 2024-03-20 SDOH — HEALTH STABILITY: PHYSICAL HEALTH: ON AVERAGE, HOW MANY MINUTES DO YOU ENGAGE IN EXERCISE AT THIS LEVEL?: 0 MIN

## 2024-03-20 SDOH — SOCIAL STABILITY: SOCIAL NETWORK: HOW OFTEN DO YOU GET TOGETHER WITH FRIENDS OR RELATIVES?: NEVER

## 2024-03-20 SDOH — HEALTH STABILITY: MENTAL HEALTH
DO YOU FEEL STRESS - TENSE, RESTLESS, NERVOUS, OR ANXIOUS, OR UNABLE TO SLEEP AT NIGHT BECAUSE YOUR MIND IS TROUBLED ALL THE TIME - THESE DAYS?: NOT AT ALL

## 2024-03-20 SDOH — SOCIAL STABILITY: SOCIAL INSECURITY: ARE YOU OR HAVE YOU BEEN THREATENED OR ABUSED PHYSICALLY, EMOTIONALLY, OR SEXUALLY BY ANYONE?: NO

## 2024-03-20 SDOH — SOCIAL STABILITY: SOCIAL INSECURITY
WITHIN THE LAST YEAR, HAVE TO BEEN RAPED OR FORCED TO HAVE ANY KIND OF SEXUAL ACTIVITY BY YOUR PARTNER OR EX-PARTNER?: NO

## 2024-03-20 SDOH — SOCIAL STABILITY: SOCIAL NETWORK: HOW OFTEN DO YOU ATTENT MEETINGS OF THE CLUB OR ORGANIZATION YOU BELONG TO?: NEVER

## 2024-03-20 SDOH — ECONOMIC STABILITY: HOUSING INSECURITY: IN THE LAST 12 MONTHS, WAS THERE A TIME WHEN YOU WERE NOT ABLE TO PAY THE MORTGAGE OR RENT ON TIME?: NO

## 2024-03-20 SDOH — SOCIAL STABILITY: SOCIAL INSECURITY: WERE YOU ABLE TO COMPLETE ALL THE BEHAVIORAL HEALTH SCREENINGS?: YES

## 2024-03-20 SDOH — HEALTH STABILITY: PHYSICAL HEALTH: ON AVERAGE, HOW MANY DAYS PER WEEK DO YOU ENGAGE IN MODERATE TO STRENUOUS EXERCISE (LIKE A BRISK WALK)?: 0 DAYS

## 2024-03-20 SDOH — SOCIAL STABILITY: SOCIAL INSECURITY: HAS ANYONE EVER THREATENED TO HURT YOUR FAMILY OR YOUR PETS?: NO

## 2024-03-20 SDOH — ECONOMIC STABILITY: TRANSPORTATION INSECURITY
IN THE PAST 12 MONTHS, HAS THE LACK OF TRANSPORTATION KEPT YOU FROM MEDICAL APPOINTMENTS OR FROM GETTING MEDICATIONS?: NO

## 2024-03-20 SDOH — ECONOMIC STABILITY: HOUSING INSECURITY: IN THE LAST 12 MONTHS, HOW MANY PLACES HAVE YOU LIVED?: 1

## 2024-03-20 SDOH — ECONOMIC STABILITY: TRANSPORTATION INSECURITY
IN THE PAST 12 MONTHS, HAS LACK OF TRANSPORTATION KEPT YOU FROM MEETINGS, WORK, OR FROM GETTING THINGS NEEDED FOR DAILY LIVING?: NO

## 2024-03-20 SDOH — SOCIAL STABILITY: SOCIAL INSECURITY: DOES ANYONE TRY TO KEEP YOU FROM HAVING/CONTACTING OTHER FRIENDS OR DOING THINGS OUTSIDE YOUR HOME?: NO

## 2024-03-20 SDOH — ECONOMIC STABILITY: FOOD INSECURITY: HOW HARD IS IT FOR YOU TO PAY FOR THE VERY BASICS LIKE FOOD, HOUSING, MEDICAL CARE, AND HEATING?: NOT HARD AT ALL

## 2024-03-20 SDOH — SOCIAL STABILITY: SOCIAL NETWORK: HOW OFTEN DO YOU ATTEND MEETINGS OF THE CLUBS OR ORGANIZATIONS YOU BELONG TO?: NEVER

## 2024-03-20 ASSESSMENT — COGNITIVE AND FUNCTIONAL STATUS - GENERAL
DAILY ACTIVITIY SCORE: 24
MOBILITY SCORE: 24
DAILY ACTIVITIY SCORE: 24
MOBILITY SCORE: 24
PATIENT BASELINE BEDBOUND: NO

## 2024-03-20 ASSESSMENT — ACTIVITIES OF DAILY LIVING (ADL)
FEEDING YOURSELF: INDEPENDENT
TOILETING: INDEPENDENT
WALKS IN HOME: INDEPENDENT
PATIENT'S MEMORY ADEQUATE TO SAFELY COMPLETE DAILY ACTIVITIES?: YES
LACK_OF_TRANSPORTATION: NO
HEARING - LEFT EAR: FUNCTIONAL
GROOMING: INDEPENDENT
ADEQUATE_TO_COMPLETE_ADL: YES
LACK_OF_TRANSPORTATION: NO
JUDGMENT_ADEQUATE_SAFELY_COMPLETE_DAILY_ACTIVITIES: YES
DRESSING YOURSELF: INDEPENDENT
BATHING: INDEPENDENT
HEARING - RIGHT EAR: FUNCTIONAL
LACK_OF_TRANSPORTATION: NO

## 2024-03-20 ASSESSMENT — LIFESTYLE VARIABLES
AUDIT-C TOTAL SCORE: 4
PRESCIPTION_ABUSE_PAST_12_MONTHS: NO
HAVE PEOPLE ANNOYED YOU BY CRITICIZING YOUR DRINKING: NO
AUDIT-C TOTAL SCORE: 4
EVER HAD A DRINK FIRST THING IN THE MORNING TO STEADY YOUR NERVES TO GET RID OF A HANGOVER: NO
AUDIT-C TOTAL SCORE: 4
EVER FELT BAD OR GUILTY ABOUT YOUR DRINKING: NO
SKIP TO QUESTIONS 9-10: 1
HAVE YOU EVER FELT YOU SHOULD CUT DOWN ON YOUR DRINKING: NO
HOW OFTEN DO YOU HAVE A DRINK CONTAINING ALCOHOL: 4 OR MORE TIMES A WEEK
HOW OFTEN DO YOU HAVE 6 OR MORE DRINKS ON ONE OCCASION: NEVER
SUBSTANCE_ABUSE_PAST_12_MONTHS: NO
HOW MANY STANDARD DRINKS CONTAINING ALCOHOL DO YOU HAVE ON A TYPICAL DAY: 1 OR 2
SKIP TO QUESTIONS 9-10: 1

## 2024-03-20 ASSESSMENT — PAIN DESCRIPTION - PROGRESSION: CLINICAL_PROGRESSION: NOT CHANGED

## 2024-03-20 ASSESSMENT — ENCOUNTER SYMPTOMS
ENDOCRINE NEGATIVE: 1
BACK PAIN: 0
FACIAL ASYMMETRY: 0
RESPIRATORY NEGATIVE: 1
DIZZINESS: 0
ARTHRALGIAS: 0
LIGHT-HEADEDNESS: 0
EYES NEGATIVE: 1
JOINT SWELLING: 1
GASTROINTESTINAL NEGATIVE: 1
SPEECH DIFFICULTY: 0
CONSTITUTIONAL NEGATIVE: 1
WEAKNESS: 0
HEADACHES: 0
MYALGIAS: 0
NUMBNESS: 0
NECK STIFFNESS: 0
NECK PAIN: 0
SEIZURES: 0
ALLERGIC/IMMUNOLOGIC NEGATIVE: 1
CARDIOVASCULAR NEGATIVE: 1
TREMORS: 0

## 2024-03-20 ASSESSMENT — PATIENT HEALTH QUESTIONNAIRE - PHQ9
2. FEELING DOWN, DEPRESSED OR HOPELESS: NOT AT ALL
SUM OF ALL RESPONSES TO PHQ9 QUESTIONS 1 & 2: 0
1. LITTLE INTEREST OR PLEASURE IN DOING THINGS: NOT AT ALL

## 2024-03-20 ASSESSMENT — PAIN SCALES - GENERAL
PAINLEVEL_OUTOF10: 0 - NO PAIN
PAINLEVEL_OUTOF10: 8
PAINLEVEL_OUTOF10: 8

## 2024-03-20 ASSESSMENT — COLUMBIA-SUICIDE SEVERITY RATING SCALE - C-SSRS
2. HAVE YOU ACTUALLY HAD ANY THOUGHTS OF KILLING YOURSELF?: NO
1. IN THE PAST MONTH, HAVE YOU WISHED YOU WERE DEAD OR WISHED YOU COULD GO TO SLEEP AND NOT WAKE UP?: NO
6. HAVE YOU EVER DONE ANYTHING, STARTED TO DO ANYTHING, OR PREPARED TO DO ANYTHING TO END YOUR LIFE?: NO

## 2024-03-20 ASSESSMENT — PAIN DESCRIPTION - ONSET: ONSET: SUDDEN

## 2024-03-20 ASSESSMENT — PAIN DESCRIPTION - ORIENTATION: ORIENTATION: LEFT

## 2024-03-20 ASSESSMENT — PAIN DESCRIPTION - LOCATION: LOCATION: WRIST

## 2024-03-20 ASSESSMENT — PAIN DESCRIPTION - DESCRIPTORS: DESCRIPTORS: SORE

## 2024-03-20 ASSESSMENT — PAIN DESCRIPTION - FREQUENCY: FREQUENCY: CONSTANT/CONTINUOUS

## 2024-03-20 ASSESSMENT — PAIN - FUNCTIONAL ASSESSMENT: PAIN_FUNCTIONAL_ASSESSMENT: 0-10

## 2024-03-20 ASSESSMENT — PAIN DESCRIPTION - PAIN TYPE: TYPE: ACUTE PAIN

## 2024-03-20 NOTE — PROGRESS NOTES
03/20/24 1316   [facility] Disability Status   Are you deaf or do you have serious difficulty hearing? N   Are you blind or do you have serious difficulty seeing, even when wearing glasses? N   Because of a physical, mental, or emotional condition, do you have serious difficulty concentrating, remembering, or making decisions? (5 years old or older) N   Do you have serious difficulty walking or climbing stairs? N   Do you have serious difficulty dressing or bathing? N   Because of a physical, mental, or emotional condition, do you have serious difficulty doing errands alone such as visiting the doctor? N

## 2024-03-20 NOTE — PROGRESS NOTES
03/20/24 1314   Discharge Planning   Living Arrangements Alone   Support Systems Friends/neighbors   Type of Residence Private residence   Number of Stairs to Enter Residence 2   Number of Stairs Within Residence 24  (2 story home with a basement)   Do you have animals or pets at home? No   Who is requesting discharge planning? Provider   Home or Post Acute Services None   Patient expects to be discharged to: Home   Does the patient need discharge transport arranged? No   Financial Resource Strain   How hard is it for you to pay for the very basics like food, housing, medical care, and heating? Not hard   Housing Stability   In the last 12 months, was there a time when you were not able to pay the mortgage or rent on time? N   In the last 12 months, how many places have you lived? 1   In the last 12 months, was there a time when you did not have a steady place to sleep or slept in a shelter (including now)? N   Transportation Needs   In the past 12 months, has lack of transportation kept you from medical appointments or from getting medications? no   In the past 12 months, has lack of transportation kept you from meetings, work, or from getting things needed for daily living? No   Patient Choice   Patient / Family choosing to utilize agency / facility established prior to hospitalization No                                     03/20/24 1314   Discharge Planning   Living Arrangements Alone   Support Systems Friends/neighbors   Type of Residence Private residence   Number of Stairs to Enter Residence 2   Number of Stairs Within Residence 24  (2 story home with a basement)   Do you have animals or pets at home? No   Who is requesting discharge planning? Provider   Home or Post Acute Services None   Patient expects to be discharged to: Home   Does the patient need discharge transport arranged? No   Financial Resource Strain   How hard is it for you to pay for the very basics like food, housing, medical care, and  heating? Not hard   Housing Stability   In the last 12 months, was there a time when you were not able to pay the mortgage or rent on time? N   In the last 12 months, how many places have you lived? 1   In the last 12 months, was there a time when you did not have a steady place to sleep or slept in a shelter (including now)? N   Transportation Needs   In the past 12 months, has lack of transportation kept you from medical appointments or from getting medications? no   In the past 12 months, has lack of transportation kept you from meetings, work, or from getting things needed for daily living? No   Patient Choice   Patient / Family choosing to utilize agency / facility established prior to hospitalization No

## 2024-03-20 NOTE — CARE PLAN
The patient's goals for the shift include      The clinical goals for the shift include no falls

## 2024-03-20 NOTE — PROGRESS NOTES
03/20/24 1311   Physical Activity   On average, how many days per week do you engage in moderate to strenuous exercise (like a brisk walk)? 0 days   On average, how many minutes do you engage in exercise at this level? 0 min   Financial Resource Strain   How hard is it for you to pay for the very basics like food, housing, medical care, and heating? Not hard   Housing Stability   In the last 12 months, was there a time when you were not able to pay the mortgage or rent on time? N   In the last 12 months, how many places have you lived? 1   In the last 12 months, was there a time when you did not have a steady place to sleep or slept in a shelter (including now)? N   Transportation Needs   In the past 12 months, has lack of transportation kept you from medical appointments or from getting medications? no   In the past 12 months, has lack of transportation kept you from meetings, work, or from getting things needed for daily living? No   Food Insecurity   Within the past 12 months, you worried that your food would run out before you got the money to buy more. Never true   Within the past 12 months, the food you bought just didn't last and you didn't have money to get more. Never true   Stress   Do you feel stress - tense, restless, nervous, or anxious, or unable to sleep at night because your mind is troubled all the time - these days? Not at all   Social Connections   In a typical week, how many times do you talk on the phone with family, friends, or neighbors? More than 3   How often do you get together with friends or relatives? Never  (Pt said that she talks to friends on the phone and once a month she sees a friend)   How often do you attend Anabaptism or Synagogue services? Never   Do you belong to any clubs or organizations such as Anabaptism groups, unions, fraternal or athletic groups, or school groups? No   How often do you attend meetings of the clubs or organizations you belong to? Never   Are  you , , , , never , or living with a partner?    Intimate Partner Violence   Within the last year, have you been afraid of your partner or ex-partner? No   Within the last year, have you been humiliated or emotionally abused in other ways by your partner or ex-partner? No   Within the last year, have you been kicked, hit, slapped, or otherwise physically hurt by your partner or ex-partner? No   Within the last year, have you been raped or forced to have any kind of sexual activity by your partner or ex-partner? No   Alcohol Use   Q1: How often do you have a drink containing alcohol? 4 or more ti   Q2: How many drinks containing alcohol do you have on a typical day when you are drinking? 1 or 2   Q3: How often do you have six or more drinks on one occasion? Never   Utilities   In the past 12 months has the electric, gas, oil, or water company threatened to shut off services in your home? No

## 2024-03-20 NOTE — H&P
History Of Present Illness  Pat Jaimes is a 78 y.o. female with PHMHX of DM I, GERD, hypothyroid, HTN, seizure disorder, vertigo, and a traumatic subdural hemorrhage in 2021 who presents with syncope and collapse this AM. She reports she was brushing her teeth when she suddenly woke up on the floor. She denies any dizziness, vertigo, CP, SOB, palpitations, aura, recent illness. She reports her blood sugars were stable this AM, though mentions that at night her blood sugar has been dropping as low as into the 40's. Her  heard her fall and immediately went to her aid. He reports she was conscious when he found her. She did hit her head, but she denies any current head pain or neurological issues. She also fell on her L wrist, and she reports pain still in the wrist. No other complaints. Her  is at the bedside.    ED Course  EKG with sinus bradycardia at 59bpm  Lab work grossly unremarkable  XR L wrist with no acute abnormalities  CT head with no acute findings, stable meningioma, post op changes  CT cervical spine with no fracture or dislocation      Past Medical History  Past Medical History:   Diagnosis Date    Diabetes mellitus type I (CMS/HCC)     Hyperlipidemia     Hypertension     Hypothyroidism     Insulin pump in place     Other specified diabetes mellitus without complications (CMS/HCC)     Diabetes mellitus of other type without complication    Personal history of other diseases of the circulatory system     History of hypertension    Seizure disorder (CMS/HCC)        Surgical History  Past Surgical History:   Procedure Laterality Date    BI STEREOTACTIC GUIDED BREAST RIGHT LOCALIZATION AND BIOPSY Right 02/02/2018    BI STEREOTACTIC GUIDED BREAST LOCALIZATION AND BIOPSY RIGHT [facility]    KNEE SURGERY Right 1989    ARTHROSCOPY    OTHER SURGICAL HISTORY  05/25/2021    Craniotomy    TUBAL LIGATION Bilateral 1982        Social History  She reports that she has never smoked. She has  never been exposed to tobacco smoke. She has never used smokeless tobacco. She reports that she does not currently use alcohol. She reports that she does not use drugs.    Family History  Family History   Problem Relation Name Age of Onset    Diabetes Mother      Heart disease Mother      Heart disease Father          Allergies  Sulfa (sulfonamide antibiotics), Sulfamethoxazole-trimethoprim, Erythromycin, and Amoxicillin-pot clavulanate    Review of Systems   Constitutional: Negative.    HENT: Negative.     Eyes: Negative.    Respiratory: Negative.     Cardiovascular: Negative.    Gastrointestinal: Negative.    Endocrine: Negative.    Genitourinary: Negative.    Musculoskeletal:  Positive for joint swelling. Negative for arthralgias, back pain, gait problem, myalgias, neck pain and neck stiffness.   Skin: Negative.    Allergic/Immunologic: Negative.    Neurological:  Positive for syncope. Negative for dizziness, tremors, seizures, facial asymmetry, speech difficulty, weakness, light-headedness, numbness and headaches.        Physical Exam  Constitutional:       General: She is not in acute distress.     Appearance: Normal appearance. She is normal weight. She is not ill-appearing, toxic-appearing or diaphoretic.   HENT:      Head: Normocephalic and atraumatic.      Mouth/Throat:      Mouth: Mucous membranes are moist.   Eyes:      Extraocular Movements: Extraocular movements intact.      Pupils: Pupils are equal, round, and reactive to light.   Cardiovascular:      Rate and Rhythm: Regular rhythm. Bradycardia present.      Pulses: Normal pulses.      Heart sounds: No murmur heard.     No friction rub. No gallop.   Pulmonary:      Effort: Pulmonary effort is normal. No respiratory distress.      Breath sounds: Normal breath sounds. No wheezing, rhonchi or rales.   Abdominal:      General: Abdomen is flat. Bowel sounds are normal. There is no distension.      Palpations: Abdomen is soft. There is no mass.       "Tenderness: There is no abdominal tenderness. There is no guarding or rebound.   Musculoskeletal:         General: Normal range of motion.      Right wrist: Normal.      Left wrist: Swelling and tenderness present. No deformity.      Cervical back: Normal range of motion.      Right lower leg: No edema.      Left lower leg: No edema.   Skin:     General: Skin is warm and dry.      Capillary Refill: Capillary refill takes less than 2 seconds.      Comments: L wrist bruising  No lacerations or abrasions   Neurological:      General: No focal deficit present.      Mental Status: She is alert and oriented to person, place, and time. Mental status is at baseline.      Cranial Nerves: Cranial nerves 2-12 are intact. No facial asymmetry.      Sensory: Sensation is intact.      Motor: Motor function is intact. No weakness or tremor.   Psychiatric:         Mood and Affect: Mood normal.         Behavior: Behavior normal.       Last Recorded Vitals  Blood pressure 160/76, pulse 61, temperature 36.8 °C (98.2 °F), temperature source Oral, resp. rate 16, height 1.6 m (5' 3\"), weight 68 kg (150 lb), SpO2 98 %.    Relevant Results      Results for orders placed or performed during the hospital encounter of 03/20/24 (from the past 24 hour(s))   ECG 12 lead   Result Value Ref Range    Ventricular Rate 59 BPM    Atrial Rate 59 BPM    MI Interval 134 ms    QRS Duration 80 ms    QT Interval 440 ms    QTC Calculation(Bazett) 435 ms    P Axis 12 degrees    R Axis -13 degrees    T Axis 28 degrees    QRS Count 9 beats    Q Onset 222 ms    P Onset 155 ms    P Offset 212 ms    T Offset 442 ms    QTC Fredericia 437 ms   CBC and Auto Differential   Result Value Ref Range    WBC 4.8 4.4 - 11.3 x10*3/uL    nRBC 0.0 0.0 - 0.0 /100 WBCs    RBC 4.83 4.00 - 5.20 x10*6/uL    Hemoglobin 14.7 12.0 - 16.0 g/dL    Hematocrit 44.7 36.0 - 46.0 %    MCV 93 80 - 100 fL    MCH 30.4 26.0 - 34.0 pg    MCHC 32.9 32.0 - 36.0 g/dL    RDW 12.4 11.5 - 14.5 %    " Platelets 223 150 - 450 x10*3/uL    Neutrophils % 66.7 40.0 - 80.0 %    Immature Granulocytes %, Automated 0.4 0.0 - 0.9 %    Lymphocytes % 24.7 13.0 - 44.0 %    Monocytes % 6.3 2.0 - 10.0 %    Eosinophils % 1.3 0.0 - 6.0 %    Basophils % 0.6 0.0 - 2.0 %    Neutrophils Absolute 3.18 1.60 - 5.50 x10*3/uL    Immature Granulocytes Absolute, Automated 0.02 0.00 - 0.50 x10*3/uL    Lymphocytes Absolute 1.18 0.80 - 3.00 x10*3/uL    Monocytes Absolute 0.30 0.05 - 0.80 x10*3/uL    Eosinophils Absolute 0.06 0.00 - 0.40 x10*3/uL    Basophils Absolute 0.03 0.00 - 0.10 x10*3/uL   Comprehensive metabolic panel   Result Value Ref Range    Glucose 198 (H) 65 - 99 mg/dL    Sodium 141 133 - 145 mmol/L    Potassium 3.8 3.4 - 5.1 mmol/L    Chloride 105 97 - 107 mmol/L    Bicarbonate 26 24 - 31 mmol/L    Urea Nitrogen 14 8 - 25 mg/dL    Creatinine 0.70 0.40 - 1.60 mg/dL    eGFR 90 >60 mL/min/1.73m*2    Calcium 9.1 8.5 - 10.4 mg/dL    Albumin 4.2 3.5 - 5.0 g/dL    Alkaline Phosphatase 88 35 - 125 U/L    Total Protein 6.9 5.9 - 7.9 g/dL    AST 20 5 - 40 U/L    Bilirubin, Total 0.9 0.1 - 1.2 mg/dL    ALT 19 5 - 40 U/L    Anion Gap 10 <=19 mmol/L   Magnesium   Result Value Ref Range    Magnesium 2.10 1.60 - 3.10 mg/dL   Serial Troponin, Initial ([facility])   Result Value Ref Range    Troponin T, High Sensitivity 10 <=14 ng/L    XR wrist left 3+ views    Result Date: 3/20/2024  Interpreted By:  Slade Tena, STUDY: XR WRIST LEFT 3+ VIEWS; 3/20/2024 10:13 am   INDICATION: Signs/Symptoms:l wrist pain; Injury.   COMPARISON: None available.   ACCESSION NUMBER(S): [id number]   ORDERING CLINICIAN: DARIEN AGUILA   TECHNIQUE: Views: Left wrist AP, lateral, oblique, and scaphoid view   FINDINGS: RESULTS: There is no evidence for acute fracture or dislocation. Mild narrowing of the wrist joint and mild degenerative changes seen. No other bony or soft tissue abnormality is identified.       No evidence for acute osseous abnormality. Mild degenerative  changes of the wrist joint.   Signed by: Slade Tena 3/20/2024 10:26 AM Dictation workstation:   [id number]    CT cervical spine wo IV contrast    Result Date: 3/20/2024  Interpreted By:  Chiquita Avendano, STUDY: CT CERVICAL SPINE WO IV CONTRAST 3/20/2024 9:42 am   INDICATION: Syncope and fall striking the left side of the head   COMPARISON: 02/11/2021   ACCESSION NUMBER(S): [id number]   ORDERING CLINICIAN: DARIEN AGUILA   TECHNIQUE: Helical scans are obtained from the skull base through the T1-T2 interspace with sagittal and coronal reformations completed by the technologist at the acquisition scanner.   All CT examinations are performed with 1 or more of the following dose reduction techniques: Automated exposure control, adjustment of mA and/or kv according to patient's size, or use of iterative reconstruction techniques.   FINDINGS: Mineralization is normal. No fracture, malalignment, or precervical soft tissue swelling is seen. The disc spaces are maintained. Anterior osteophytes project from the endplates at the C4-5, C5-6, and C6-7 levels and minimally at the C3-4 level. The atlantoaxial relationship is preserved. There is degenerative subchondral cyst noted within the odontoid process.       No fracture or dislocation of cervical spine.   Signed by: Chiquita Avendano 3/20/2024 9:59 AM Dictation workstation:   [id number]    CT head wo IV contrast    Result Date: 3/20/2024  Interpreted By:  Chiquita Avendano, STUDY: CT HEAD WO IV CONTRAST 3/20/2024 9:42 am   INDICATION: Syncopal episode striking the left side of the head with history of subdural hematoma   COMPARISON: 09/16/2023   ACCESSION NUMBER(S): [id number]   ORDERING CLINICIAN: DARIEN AGUILA   TECHNIQUE: Unenhanced axial images of the brain are completed with sagittal and coronal reformations performed by the technologist at the acquisition scanner.   All CT examinations are performed with 1 or more of the following dose reduction techniques: Automated exposure  control, adjustment of mA and/or kv according to patient's size, or use of iterative reconstruction techniques.   FINDINGS: There is mild ventricular enlargement with proportionate mild prominence of the sulci and of the sylvian fissures. There is no mass effect, midline shift, intracranial hemorrhage, or extra-axial fluid collection. Gray/white matter differentiation is maintained. A partially calcified extra-axial dural-based 9 mm mass is seen overlying the left cerebral convexity posteriorly without associated vasogenic edema. This is unchanged and consistent with a partially calcified meningioma.   Calcified plaque is seen within the distal internal carotid arteries bilaterally. No hyperdense MCA sign is observed.   There is postoperative change secondary to a left parietal craniotomy.       There is mild age-appropriate atrophy with postoperative change from left parietal craniotomy.   No acute intracranial process.   Stable 9 mm meningioma overlying the left cerebral convexity posteriorly.   Signed by: Chiquita Avendano 3/20/2024 9:49 AM Dictation workstation:   [id number]    XR chest 2 views    Result Date: 3/20/2024  Interpreted By:  Chiquita Avendano, STUDY: XR CHEST 2 VIEWS 3/20/2024 9:22 am   INDICATION: Signs/Symptoms:syncope   COMPARISON: None available.   ACCESSION NUMBER(S): [id number]   ORDERING CLINICIAN: DARIEN AGUILA   TECHNIQUE: AP erect and lateral views of the chest   FINDINGS: The cardiac size is normal with calcified plaque visible within the aortic arch. The lungs are clear. No pleural abnormality is seen.   There are mild degenerative changes of the thoracic spine.       No acute cardiopulmonary disease.   Signed by: Chiquita Avendano 3/20/2024 9:37 AM Dictation workstation:   [id number]    ECG 12 lead    Result Date: 3/20/2024  Sinus bradycardia Otherwise normal ECG When compared with ECG of 16-APR-2021 15:40, Previous ECG has undetermined rhythm, needs review         Assessment/Plan   Principal  Problem:    Syncope and collapse    Syncope and collapse  -ECG on admission with sinus bradycardia  -HScTn trend: 10,11  -Negative head CT  -Check echo, carotid US, orthostatic VS  -AM labs  -Telemetry overnight  -Consider ZIO patch at DC    L wrist contusion  -No fracture or dislocation on XR  -Pain control with Tylenol as needed  -Ice as needed  -ACE wrap    DM I  -A1c 6.9 2/2024  -Has continuous glucose monitor and insulin pump in place  -Follows with endocrinology  -DM diet  -Hypoglycemia protocol    HTN  -Continue home Norvasc and losartan  -Monitor VS q4hrs    BPPV  -Has not had any issues with this recently  -Currently doing outpatient vestibular rehab    Seizure disorder  -Continue home Keppra dose  -No recent seizures    GERD  -Continue home PPI    Hypothyroidism  -Continue home levothyroxine    Urinary incontinence  -Continue oxybutynin    DVT prophylaxis   -Hep SQ       I spent 60 minutes in the professional and overall care of this patient.      Zaida Duran, APRN-CNP

## 2024-03-20 NOTE — CARE PLAN
Pt does not have a POA or Living Will  ADOD: 1 day    Pt lives at home with her  in a 2 story home with a basement and 2 steps to enter the home. She is here for syncope.  She does not drive, her  drives her to her appointments. She uses a walking stick when out of the home  She is a diabetic and she wears an insulin pump and the guardian BS monitor  She wears readers, no hearing aids.  She is able to shop, drive, and clean. She orders out for meals.  She is independent with ADL's   Her PCP is Dr. En Yee and she uses [facility] on [address] for her insulin and diabetic supples, [facility] on [address] in [place] and [facility].  No anticipated discharge needs at this time      DISCHARGE PLAN: HOME WITH

## 2024-03-20 NOTE — PROGRESS NOTES
Pharmacy Medication History Review    Pat Jaimes is a 78 y.o. female admitted for Syncope and collapse. Pharmacy reviewed the patient's [id number] medications and allergies for accuracy.    Medications ADDED:  Tylenol 500mg  Rolaids  Saline nasal spray  Medications CHANGED:  Multivitamin - not taking  Meclizine 25mg - not taking  Medications REMOVED:   none     The list below reflects the updated PTA list. Comments regarding how patient may be taking medications differently can be found in the Admit Orders Activity  Prior to Admission Medications   Prescriptions Last Dose Informant   LevoxyL 137 mcg tablet 3/20/2024 self   Sig: Take 1 tablet (137 mcg) by mouth once daily in the morning. Take before meals.   acetaminophen (Tylenol) 500 mg tablet  self   Sig: Take by mouth every 6 hours if needed for mild pain (1 - 3).   amLODIPine (Norvasc) 5 mg tablet 3/20/2024 self   Sig: Take 1 tablet (5 mg) by mouth once daily.   blood sugar diagnostic (Accu-Chek Guide test strips) strip  self   Sig: USE TO TEST 10 TIMES DAILY   calcium carb/magnesium hydrox (ROLAIDS ORAL)  self   Sig: Take by mouth.   insulin aspart, with niacinamide, (Fiasp U-100 Insulin) 100 unit/mL injection  self   Sig: UP  UNITS DAILY VIA INSULIN PUMP   levETIRAcetam (Keppra) 750 mg tablet 3/20/2024 self   Sig: Take 1 tablet (750 mg) by mouth 2 times a day.   losartan (Cozaar) 100 mg tablet 3/19/2024 self   Sig: Take 1 tablet (100 mg) by mouth once daily.   oxybutynin XL (Ditropan-XL) 10 mg 24 hr tablet 3/20/2024 self   Sig: Take 1 tablet (10 mg) by mouth once daily.   pantoprazole (ProtoNix) 40 mg EC tablet 3/20/2024 self   Sig: Take 1 tablet (40 mg) by mouth once daily in the morning. Take before meals.   simvastatin (Zocor) 20 mg tablet 3/19/2024 self   Sig: Take 1 tablet (20 mg) by mouth once daily.   sodium chloride (Ocean) 0.65 % nasal spray  self   Sig: Administer 1 spray into each nostril if needed for congestion.       Facility-Administered Medications: None        The list below reflects the updated allergy list. Please review each documented allergy for additional clarification and justification.  Allergies  Reviewed by Sofi Saravia on 3/20/2024        Severity Reactions Comments    Sulfa (sulfonamide Antibiotics) High Hives     Sulfamethoxazole-trimethoprim Medium Hives     Erythromycin Not Specified Other     Amoxicillin-pot Clavulanate Low Diarrhea             Pharmacy has been updated to [facility].    Sources used to complete the med history include dispense history, AVS, PTA medication list, patient interview. Patient is a good historian.    Below are additional concerns with the patient's PTA list.  Patient reports not taking a multivitamin and would like it removed from PTA medication list.  Patient reports taking levetiracetam 750mg daily at 10am and 10pm. Patient reports taking Levoxyl 137mcg every day except Sundays.    Sofi Saravia  Please reach out via [employer] Secure Chat for questions

## 2024-03-20 NOTE — ED PROVIDER NOTES
HPI   Chief Complaint   Patient presents with    Syncope     Pt presents to ed after syncopal episode this morning while brushing her teeth.pt states that she does not remember the event unsure of how long she was out.  found and called ems. Pt is type 1 DM with an insulin pump. Denies any blood thinners or head trauma but she did fall on her L wrist with some bruising noted. No additional complaints at this time.       Patient is a 78-year-old female with history of type 1 diabetes, hypertension, seizure disorder brought in by EMS for syncopal event that occurred this morning.  Patient states she had her breakfast and bolster insulin and then was brushing her teeth and next thing she knew she was on the ground.  She did lose consciousness and she does endorse hitting her head.  She is not on blood thinners.  The patient denies any preceding symptoms such as chest pain, shortness of breath, dizziness, lightheadedness, headache.  She is asymptomatic at this time.  She does have history of a traumatic subdural hemorrhage with craniotomy.  She states she was oriented after the event but was shaking on the ground.  She has no previous cardiac history.                        [place] Coma Scale Score: 15                     Patient History   Past Medical History:   Diagnosis Date    Diabetes mellitus type I (CMS/HCC)     Hyperlipidemia     Hypertension     Hypothyroidism     Insulin pump in place     Other specified diabetes mellitus without complications (CMS/HCC)     Diabetes mellitus of other type without complication    Personal history of other diseases of the circulatory system     History of hypertension    Seizure disorder (CMS/HCC)      Past Surgical History:   Procedure Laterality Date    BI STEREOTACTIC GUIDED BREAST RIGHT LOCALIZATION AND BIOPSY Right 02/02/2018    BI STEREOTACTIC GUIDED BREAST LOCALIZATION AND BIOPSY RIGHT [facility] CLINICAL LEGACY    KNEE SURGERY Right 1989    ARTHROSCOPY    OTHER SURGICAL  HISTORY  05/25/2021    Craniotomy    TUBAL LIGATION Bilateral 1982     Family History   Problem Relation Name Age of Onset    Diabetes Mother      Heart disease Mother      Heart disease Father       Social History     Tobacco Use    Smoking status: Never     Passive exposure: Never    Smokeless tobacco: Never   Vaping Use    Vaping Use: Never used   Substance Use Topics    Alcohol use: Not Currently    Drug use: Never       Physical Exam   ED Triage Vitals [03/20/24 0815]   Temperature Heart Rate Respirations BP   36.5 °C (97.7 °F) 59 15 (!) 188/64      Pulse Ox Temp Source Heart Rate Source Patient Position   99 % Oral Monitor Lying      BP Location FiO2 (%)     Left arm --       Physical Exam  Vitals and nursing note reviewed.   Constitutional:       General: She is not in acute distress.     Appearance: She is well-developed.      Comments: Well-appearing female in no acute distress resting comfortably in hospital bed   HENT:      Head: Normocephalic and atraumatic.   Eyes:      Conjunctiva/sclera: Conjunctivae normal.   Cardiovascular:      Rate and Rhythm: Normal rate and regular rhythm.      Heart sounds: No murmur heard.  Pulmonary:      Effort: Pulmonary effort is normal. No respiratory distress.      Breath sounds: Normal breath sounds.   Abdominal:      Palpations: Abdomen is soft.      Tenderness: There is no abdominal tenderness.   Musculoskeletal:         General: No swelling.      Cervical back: Neck supple.   Skin:     General: Skin is warm and dry.      Capillary Refill: Capillary refill takes less than 2 seconds.   Neurological:      Mental Status: She is alert.      Comments: NIH 0.  Oriented to person place and time without cranial nerve deficit, weakness, sensory deficit.   Psychiatric:         Mood and Affect: Mood normal.         ED Course & MDM   ED Course as of 03/20/24 1553   Wed Mar 20, 2024   0916 EKG personally interpreted by me performed at 811  Sinus bradycardia with a ventricular rate  59 left axis deviation normal intervals no acute ischemic changes [EF]      ED Course User Index  [EF] Kelsey Maldonado, DO         Diagnoses as of 03/20/24 1553   Syncope, unspecified syncope type   Type 1 diabetes mellitus without complication (CMS/[facility])       Medical Decision Making  Parts of this chart have been completed using voice recognition software. Please excuse any errors of transcription.  My thought process and reason for plan has been formulated from the time that I saw the patient until the time of disposition and is not specific to one specific moment during their visit and furthermore my MDM encompasses this entire chart and not only this text box.      HPI: Detailed above.    Exam: A medically appropriate exam performed, outlined above, given the known history and presentation.    History obtained from: Patient    EKG: No evidence of acute ischemia or STEMI    Social Determinants of Health considered during this visit: Lives independently with     Medications given during visit:  Medications   amLODIPine (Norvasc) tablet 5 mg (has no administration in time range)   levETIRAcetam (Keppra) tablet 750 mg (has no administration in time range)   levothyroxine (Synthroid, Levoxyl) tablet 137 mcg (has no administration in time range)   losartan (Cozaar) tablet 100 mg (100 mg oral Given 3/20/24 1522)   oxybutynin (Ditropan) tablet 5 mg (has no administration in time range)   pantoprazole (ProtoNix) EC tablet 40 mg (has no administration in time range)   simvastatin (Zocor) tablet 20 mg (has no administration in time range)   heparin (porcine) injection 5,000 Units (5,000 Units subcutaneous Given 3/20/24 1522)   acetaminophen (Tylenol) tablet 650 mg (has no administration in time range)     Or   acetaminophen (Tylenol) oral liquid 650 mg (has no administration in time range)     Or   acetaminophen (Tylenol) suppository 650 mg (has no administration in time range)   ondansetron (Zofran) tablet 4 mg (has  no administration in time range)     Or   ondansetron (Zofran) injection 4 mg (has no administration in time range)   dextrose 50 % injection 25 g (has no administration in time range)   glucagon (Glucagen) injection 1 mg (has no administration in time range)   dextrose 50 % injection 12.5 g (has no administration in time range)   patient supplied pump INSULIN PUMP- PATIENT SUPPLIED ( pump - subcutaneous Self Administered Via Pump 3/20/24 1225)        Diagnostic/tests  Labs Reviewed   COMPREHENSIVE METABOLIC PANEL - Abnormal       Result Value    Glucose 198 (*)     Sodium 141      Potassium 3.8      Chloride 105      Bicarbonate 26      Urea Nitrogen 14      Creatinine 0.70      eGFR 90      Calcium 9.1      Albumin 4.2      Alkaline Phosphatase 88      Total Protein 6.9      AST 20      Bilirubin, Total 0.9      ALT 19      Anion Gap 10     MAGNESIUM - Normal    Magnesium 2.10     SERIAL TROPONIN, INITIAL ([facility]) - Normal    Troponin T, High Sensitivity 10     SERIAL TROPONIN,  2 HOUR ([facility]) - Normal    Troponin T, High Sensitivity 11     CBC WITH AUTO DIFFERENTIAL    WBC 4.8      nRBC 0.0      RBC 4.83      Hemoglobin 14.7      Hematocrit 44.7      MCV 93      MCH 30.4      MCHC 32.9      RDW 12.4      Platelets 223      Neutrophils % 66.7      Immature Granulocytes %, Automated 0.4      Lymphocytes % 24.7      Monocytes % 6.3      Eosinophils % 1.3      Basophils % 0.6      Neutrophils Absolute 3.18      Immature Granulocytes Absolute, Automated 0.02      Lymphocytes Absolute 1.18      Monocytes Absolute 0.30      Eosinophils Absolute 0.06      Basophils Absolute 0.03     TROPONIN T SERIES, HIGH SENSITIVITY (0, 2 HR, 6 HR)    Narrative:     The following orders were created for panel order Troponin T Series, High Sensitivity (0, 2HR, 6HR).  Procedure                               Abnormality         Status                     ---------                               -----------         ------                      Serial Troponin, Initial...[[id number]]  Normal              Final result               Serial Troponin, 2 Hour ...[[id number]]  Normal              Final result               Serial Troponin, 6 Hour ...[[id number]]                                                   Please view results for these tests on the individual orders.   SERIAL TROPONIN, 6 HOUR ([facility])      Transthoracic Echo (TTE) Complete   Final Result      Carotid duplex bilateral         XR wrist left 3+ views   Final Result   No evidence for acute osseous abnormality. Mild degenerative changes   of the wrist joint.        Signed by: Slade Tena 3/20/2024 10:26 AM   Dictation workstation:   [id number]      CT head wo IV contrast   Final Result   There is mild age-appropriate atrophy with postoperative change from   left parietal craniotomy.        No acute intracranial process.        Stable 9 mm meningioma overlying the left cerebral convexity   posteriorly.        Signed by: Chiquita Avendano 3/20/2024 9:49 AM   Dictation workstation:   [id number]      CT cervical spine wo IV contrast   Final Result   No fracture or dislocation of cervical spine.        Signed by: Chiquita Avendano 3/20/2024 9:59 AM   Dictation workstation:   [id number]      XR chest 2 views   Final Result   No acute cardiopulmonary disease.        Signed by: Chiquita Avendano 3/20/2024 9:37 AM   Dictation workstation:   [id number]           Considerations/further MDM:  78-year-old female presenting for evaluation of syncopal episode that occurred this morning.  During the ER visit the patient is well-appearing resting comfortably in hospital bed in no acute distress.  Vital signs are significant for sinus bradycardia but otherwise within normal limits.  Patient has no neurologic deficits on exam, NIH 0 without vascular deficits.  EKG without signs of ischemia.  Basic laboratory workup unremarkable with no signs of leukocytosis or electrolyte abnormality.  Initial troponin 10 with repeat  troponin 11.  Patient was placed on telemetry without signs of arrhythmia while monitoring.  Given the patient's history of traumatic subdural hemorrhage CT head was performed to rule out intracranial hemorrhage and was unremarkable and stable compared to previous.  Stable meningioma was discussed with patient as CT findings.  CT C-spine unremarkable without evidence of acute fracture or dislocation. X-rays negative for any acute cardiopulmonary process.  I have low suspicion for pulmonary embolism causing the patient's syncopal event at this time the CT angio for PE was not performed. Given the patient's unexplained syncopal event, discussion was had with patient regarding cardiac observation overnight for further trending of her troponins and monitoring on telemetry.  Patient is agreeable.  She was admitted to the cardiac observation unit in stable condition.      Procedure  Procedures     Malgorzata Cartwright PA-C  03/20/24 1550

## 2024-03-20 NOTE — PROGRESS NOTES
03/20/24 1317   Current Planned Discharge Disposition   Current Planned Discharge Disposition Home                                     

## 2024-03-21 ENCOUNTER — TELEPHONE (OUTPATIENT)
Dept: PRIMARY CARE | Facility: CLINIC | Age: 79
End: 2024-03-21
Payer: MEDICARE

## 2024-03-21 VITALS
DIASTOLIC BLOOD PRESSURE: 62 MMHG | HEIGHT: 63 IN | OXYGEN SATURATION: 98 % | TEMPERATURE: 98.1 F | HEART RATE: 69 BPM | RESPIRATION RATE: 18 BRPM | WEIGHT: 160.5 LBS | SYSTOLIC BLOOD PRESSURE: 168 MMHG | BODY MASS INDEX: 28.44 KG/M2

## 2024-03-21 LAB
ALBUMIN SERPL-MCNC: 3.8 G/DL (ref 3.5–5)
ALP BLD-CCNC: 80 U/L (ref 35–125)
ALT SERPL-CCNC: 15 U/L (ref 5–40)
ANION GAP SERPL CALC-SCNC: 9 MMOL/L
AST SERPL-CCNC: 14 U/L (ref 5–40)
ATRIAL RATE: 59 BPM
BILIRUB SERPL-MCNC: 1.1 MG/DL (ref 0.1–1.2)
BUN SERPL-MCNC: 17 MG/DL (ref 8–25)
CALCIUM SERPL-MCNC: 8.7 MG/DL (ref 8.5–10.4)
CHLORIDE SERPL-SCNC: 107 MMOL/L (ref 97–107)
CO2 SERPL-SCNC: 25 MMOL/L (ref 24–31)
CREAT SERPL-MCNC: 0.7 MG/DL (ref 0.4–1.6)
EGFRCR SERPLBLD CKD-EPI 2021: 90 ML/MIN/1.73M*2
ERYTHROCYTE [DISTWIDTH] IN BLOOD BY AUTOMATED COUNT: 12.4 % (ref 11.5–14.5)
GLUCOSE BLD MANUAL STRIP-MCNC: 185 MG/DL (ref 74–99)
GLUCOSE SERPL-MCNC: 252 MG/DL (ref 65–99)
HCT VFR BLD AUTO: 44 % (ref 36–46)
HGB BLD-MCNC: 14.4 G/DL (ref 12–16)
MCH RBC QN AUTO: 30.3 PG (ref 26–34)
MCHC RBC AUTO-ENTMCNC: 32.7 G/DL (ref 32–36)
MCV RBC AUTO: 93 FL (ref 80–100)
NRBC BLD-RTO: 0 /100 WBCS (ref 0–0)
P AXIS: 12 DEGREES
P OFFSET: 212 MS
P ONSET: 155 MS
PLATELET # BLD AUTO: 227 X10*3/UL (ref 150–450)
POTASSIUM SERPL-SCNC: 4 MMOL/L (ref 3.4–5.1)
PR INTERVAL: 134 MS
PROT SERPL-MCNC: 5.9 G/DL (ref 5.9–7.9)
Q ONSET: 222 MS
QRS COUNT: 9 BEATS
QRS DURATION: 80 MS
QT INTERVAL: 440 MS
QTC CALCULATION(BAZETT): 435 MS
QTC FREDERICIA: 437 MS
R AXIS: -13 DEGREES
RBC # BLD AUTO: 4.75 X10*6/UL (ref 4–5.2)
SODIUM SERPL-SCNC: 141 MMOL/L (ref 133–145)
T AXIS: 28 DEGREES
T OFFSET: 442 MS
VENTRICULAR RATE: 59 BPM
WBC # BLD AUTO: 5.8 X10*3/UL (ref 4.4–11.3)

## 2024-03-21 PROCEDURE — 2500000002 HC RX 250 W HCPCS SELF ADMINISTERED DRUGS (ALT 637 FOR MEDICARE OP, ALT 636 FOR OP/ED): Performed by: NURSE PRACTITIONER

## 2024-03-21 PROCEDURE — 96372 THER/PROPH/DIAG INJ SC/IM: CPT | Performed by: NURSE PRACTITIONER

## 2024-03-21 PROCEDURE — 82947 ASSAY GLUCOSE BLOOD QUANT: CPT | Mod: 59

## 2024-03-21 PROCEDURE — 84075 ASSAY ALKALINE PHOSPHATASE: CPT | Performed by: NURSE PRACTITIONER

## 2024-03-21 PROCEDURE — 36415 COLL VENOUS BLD VENIPUNCTURE: CPT | Performed by: NURSE PRACTITIONER

## 2024-03-21 PROCEDURE — 2500000001 HC RX 250 WO HCPCS SELF ADMINISTERED DRUGS (ALT 637 FOR MEDICARE OP): Performed by: NURSE PRACTITIONER

## 2024-03-21 PROCEDURE — 2500000004 HC RX 250 GENERAL PHARMACY W/ HCPCS (ALT 636 FOR OP/ED): Performed by: NURSE PRACTITIONER

## 2024-03-21 PROCEDURE — G0378 HOSPITAL OBSERVATION PER HR: HCPCS

## 2024-03-21 PROCEDURE — 85027 COMPLETE CBC AUTOMATED: CPT | Performed by: NURSE PRACTITIONER

## 2024-03-21 PROCEDURE — 99232 SBSQ HOSP IP/OBS MODERATE 35: CPT

## 2024-03-21 RX ADMIN — LEVETIRACETAM 750 MG: 750 TABLET, FILM COATED ORAL at 08:11

## 2024-03-21 RX ADMIN — LEVOTHYROXINE SODIUM 137 MCG: 0.11 TABLET ORAL at 05:02

## 2024-03-21 RX ADMIN — HEPARIN SODIUM 5000 UNITS: 5000 INJECTION, SOLUTION INTRAVENOUS; SUBCUTANEOUS at 05:02

## 2024-03-21 RX ADMIN — PANTOPRAZOLE SODIUM 40 MG: 40 TABLET, DELAYED RELEASE ORAL at 05:02

## 2024-03-21 RX ADMIN — AMLODIPINE BESYLATE 5 MG: 5 TABLET ORAL at 08:10

## 2024-03-21 RX ADMIN — OXYBUTYNIN CHLORIDE 5 MG: 5 TABLET ORAL at 08:11

## 2024-03-21 ASSESSMENT — COGNITIVE AND FUNCTIONAL STATUS - GENERAL
MOBILITY SCORE: 24
DAILY ACTIVITIY SCORE: 24

## 2024-03-21 ASSESSMENT — PAIN SCALES - GENERAL: PAINLEVEL_OUTOF10: 0 - NO PAIN

## 2024-03-21 NOTE — CARE PLAN
The patient's goals for the shift include      The clinical goals for the shift include no falls overnight.

## 2024-03-21 NOTE — DISCHARGE SUMMARY
Discharge Diagnosis  Syncope and collapse    Issues Requiring Follow-Up  PCP follow up    Test Results Pending At Discharge  Pending Labs       No current pending labs.            Hospital Course   Pat Jaimes is a 78 y.o. female with PHMHX of DM I, GERD, hypothyroid, HTN, seizure disorder, vertigo, and a traumatic subdural hemorrhage in 2021 who presents with syncope and collapse this AM. She reports she was brushing her teeth when she suddenly woke up on the floor. She denies any dizziness, vertigo, CP, SOB, palpitations, aura, recent illness. She reports her blood sugars were stable this AM, though mentions that at night her blood sugar has been dropping as low as into the 40's. Her  heard her fall and immediately went to her aid. He reports she was conscious when he found her. She did hit her head, but she denies any current head pain or neurological issues. She also fell on her L wrist, and she reports pain still in the wrist. No other complaints. Her  is at the bedside.     ED Course  EKG with sinus bradycardia at 59bpm  Lab work grossly unremarkable  XR L wrist with no acute abnormalities  CT head with no acute findings, stable meningioma, post op changes  CT cervical spine with no fracture or dislocation    Hospital Course: Patient was admitted for observation telemetry monitoring overnight.  She had a normal echocardiogram with an EF of 60 to 65%.  She had a carotid ultrasound with less than 50% stenosis of both right and left internal carotid artery.  Negative troponin.  Lab work within normal limits.  Patient remained asymptomatic overnight.  She is stable at this time for discharge she is advised to continue all home medications and follow-up with her PCP in 1 week.     Pertinent Physical Exam At Time of Discharge  Physical Exam  HENT:      Head: Atraumatic.   Cardiovascular:      Rate and Rhythm: Normal rate and regular rhythm.      Pulses: Normal pulses.   Pulmonary:      Effort:  Pulmonary effort is normal.      Breath sounds: Normal breath sounds.   Neurological:      Mental Status: She is alert and oriented to person, place, and time.         Home Medications     Medication List      CONTINUE taking these medications     Accu-Chek Guide test strips strip; Generic drug: blood sugar diagnostic;   USE TO TEST 10 TIMES DAILY   acetaminophen 500 mg tablet; Commonly known as: Tylenol   amLODIPine 5 mg tablet; Commonly known as: Norvasc; Take 1 tablet (5 mg)   by mouth once daily.   Fiasp U-100 Insulin 100 unit/mL injection; Generic drug: insulin aspart   (with niacinamide); UP  UNITS DAILY VIA INSULIN PUMP   levETIRAcetam 750 mg tablet; Commonly known as: Keppra; Take 1 tablet   (750 mg) by mouth 2 times a day.   LevoxyL 137 mcg tablet; Generic drug: levothyroxine; Take 1 tablet (137   mcg) by mouth once daily in the morning. Take before meals.   losartan 100 mg tablet; Commonly known as: Cozaar; Take 1 tablet (100   mg) by mouth once daily.   oxybutynin XL 10 mg 24 hr tablet; Commonly known as: Ditropan-XL; Take 1   tablet (10 mg) by mouth once daily.   pantoprazole 40 mg EC tablet; Commonly known as: ProtoNix; Take 1 tablet   (40 mg) by mouth once daily in the morning. Take before meals.   ROLAIDS ORAL   simvastatin 20 mg tablet; Commonly known as: Zocor; Take 1 tablet (20   mg) by mouth once daily.   sodium chloride 0.65 % nasal spray; Commonly known as: Vega     ASK your doctor about these medications     meclizine 25 mg tablet; Commonly known as: Antivert; Take 1 tablet (25   mg) by mouth 3 times a day as needed for dizziness or nausea.   multivitamin tablet; Take 1 tablet by mouth once daily.       Outpatient Follow-Up  Future Appointments   Date Time Provider Department Center   4/9/2024  3:30 PM [facility]   4/18/2024 11:30 AM [facility]   4/25/2024 11:30 AM [facility]   5/10/2024  2:00 PM En Yee MD  [id number] [facility]   6/6/2024  1:15 PM Agnieszka Gibson PharmD [id number] [facility]   8/22/2024  2:30 PM En Yee MD [id number] [facility]   9/26/2024  1:00 PM [facility] Cord[email or web address]       SHAUNA QuintanillaC

## 2024-03-21 NOTE — PROGRESS NOTES
"Pat Jaimes is a 78 y.o. female on day 0 of admission presenting with Syncope and collapse.    Subjective   Patient upright in bed.  Alert and oriented x 3.  No episodes of syncope overnight.  Denies complaints of dizziness, lightheadedness, shortness of breath, chest pain, palpitations.  Tells me her left wrist is still a bit sore.        Objective     Physical Exam  Vitals and nursing note reviewed.   Constitutional:       Appearance: Normal appearance.   HENT:      Head: Atraumatic.   Cardiovascular:      Rate and Rhythm: Normal rate and regular rhythm.      Pulses: Normal pulses.      Heart sounds: Normal heart sounds.   Pulmonary:      Effort: Pulmonary effort is normal.      Breath sounds: Normal breath sounds.   Abdominal:      General: Abdomen is flat.      Palpations: Abdomen is soft.   Neurological:      Mental Status: She is alert and oriented to person, place, and time.       Last Recorded Vitals  Blood pressure 168/62, pulse 69, temperature 36.7 °C (98.1 °F), temperature source Oral, resp. rate 18, height 1.6 m (5' 3\"), weight 72.8 kg (160 lb 7.9 oz), SpO2 98 %.  Intake/Output last 3 Shifts:  I/O last 3 completed shifts:  In: 260 (3.6 mL/kg) [P.O.:260]  Out: - (0 mL/kg)   Weight: 72.8 kg     Relevant Results    Results for orders placed or performed during the hospital encounter of 03/20/24 (from the past 24 hour(s))   ECG 12 lead   Result Value Ref Range    Ventricular Rate 59 BPM    Atrial Rate 59 BPM    UT Interval 134 ms    QRS Duration 80 ms    QT Interval 440 ms    QTC Calculation(Bazett) 435 ms    P Axis 12 degrees    R Axis -13 degrees    T Axis 28 degrees    QRS Count 9 beats    Q Onset 222 ms    P Onset 155 ms    P Offset 212 ms    T Offset 442 ms    QTC Fredericia 437 ms   CBC and Auto Differential   Result Value Ref Range    WBC 4.8 4.4 - 11.3 x10*3/uL    nRBC 0.0 0.0 - 0.0 /100 WBCs    RBC 4.83 4.00 - 5.20 x10*6/uL    Hemoglobin 14.7 12.0 - 16.0 g/dL    Hematocrit 44.7 36.0 - 46.0 %    " MCV 93 80 - 100 fL    MCH 30.4 26.0 - 34.0 pg    MCHC 32.9 32.0 - 36.0 g/dL    RDW 12.4 11.5 - 14.5 %    Platelets 223 150 - 450 x10*3/uL    Neutrophils % 66.7 40.0 - 80.0 %    Immature Granulocytes %, Automated 0.4 0.0 - 0.9 %    Lymphocytes % 24.7 13.0 - 44.0 %    Monocytes % 6.3 2.0 - 10.0 %    Eosinophils % 1.3 0.0 - 6.0 %    Basophils % 0.6 0.0 - 2.0 %    Neutrophils Absolute 3.18 1.60 - 5.50 x10*3/uL    Immature Granulocytes Absolute, Automated 0.02 0.00 - 0.50 x10*3/uL    Lymphocytes Absolute 1.18 0.80 - 3.00 x10*3/uL    Monocytes Absolute 0.30 0.05 - 0.80 x10*3/uL    Eosinophils Absolute 0.06 0.00 - 0.40 x10*3/uL    Basophils Absolute 0.03 0.00 - 0.10 x10*3/uL   Comprehensive metabolic panel   Result Value Ref Range    Glucose 198 (H) 65 - 99 mg/dL    Sodium 141 133 - 145 mmol/L    Potassium 3.8 3.4 - 5.1 mmol/L    Chloride 105 97 - 107 mmol/L    Bicarbonate 26 24 - 31 mmol/L    Urea Nitrogen 14 8 - 25 mg/dL    Creatinine 0.70 0.40 - 1.60 mg/dL    eGFR 90 >60 mL/min/1.73m*2    Calcium 9.1 8.5 - 10.4 mg/dL    Albumin 4.2 3.5 - 5.0 g/dL    Alkaline Phosphatase 88 35 - 125 U/L    Total Protein 6.9 5.9 - 7.9 g/dL    AST 20 5 - 40 U/L    Bilirubin, Total 0.9 0.1 - 1.2 mg/dL    ALT 19 5 - 40 U/L    Anion Gap 10 <=19 mmol/L   Magnesium   Result Value Ref Range    Magnesium 2.10 1.60 - 3.10 mg/dL   Serial Troponin, Initial ([facility])   Result Value Ref Range    Troponin T, High Sensitivity 10 <=14 ng/L   Serial Troponin, 2 Hour ([facility])   Result Value Ref Range    Troponin T, High Sensitivity 11 <=14 ng/L   Carotid duplex bilateral   Result Value Ref Range    BSA 1.74 m2   Transthoracic Echo (TTE) Complete   Result Value Ref Range    AV pk kris 1.19 m/s    LVOT diam 2.08 cm    AV mn grad 2.8 mmHg    MV E/A ratio 0.67     Tricuspid annular plane systolic excursion 1.9 cm    LV biplane EF 62 %    LA vol index A/L 21.3 ml/m2    MV avg E/e' ratio 11.22     RV free wall pk S' 16.92 cm/s    RVSP 29.1 mmHg    LVIDd 4.05 cm    AV  pk grad 5.6 mmHg    Aortic Valve Area by Continuity of VTI 2.39 cm2    Aortic Valve Area by Continuity of Peak Velocity 2.56 cm2    LV A4C EF 57.0    Serial Troponin, 6 Hour ([facility])   Result Value Ref Range    Troponin T, High Sensitivity 9 <=14 ng/L   POCT GLUCOSE   Result Value Ref Range    POCT Glucose 205 (H) 74 - 99 mg/dL   CBC   Result Value Ref Range    WBC 5.8 4.4 - 11.3 x10*3/uL    nRBC 0.0 0.0 - 0.0 /100 WBCs    RBC 4.75 4.00 - 5.20 x10*6/uL    Hemoglobin 14.4 12.0 - 16.0 g/dL    Hematocrit 44.0 36.0 - 46.0 %    MCV 93 80 - 100 fL    MCH 30.3 26.0 - 34.0 pg    MCHC 32.7 32.0 - 36.0 g/dL    RDW 12.4 11.5 - 14.5 %    Platelets 227 150 - 450 x10*3/uL   Comprehensive metabolic panel   Result Value Ref Range    Glucose 252 (H) 65 - 99 mg/dL    Sodium 141 133 - 145 mmol/L    Potassium 4.0 3.4 - 5.1 mmol/L    Chloride 107 97 - 107 mmol/L    Bicarbonate 25 24 - 31 mmol/L    Urea Nitrogen 17 8 - 25 mg/dL    Creatinine 0.70 0.40 - 1.60 mg/dL    eGFR 90 >60 mL/min/1.73m*2    Calcium 8.7 8.5 - 10.4 mg/dL    Albumin 3.8 3.5 - 5.0 g/dL    Alkaline Phosphatase 80 35 - 125 U/L    Total Protein 5.9 5.9 - 7.9 g/dL    AST 14 5 - 40 U/L    Bilirubin, Total 1.1 0.1 - 1.2 mg/dL    ALT 15 5 - 40 U/L    Anion Gap 9 <=19 mmol/L   POCT GLUCOSE   Result Value Ref Range    POCT Glucose 185 (H) 74 - 99 mg/dL             Assessment/Plan   Principal Problem:    Syncope and collapse    Syncope and collapse  -ECG on admission with sinus bradycardia  -HScTn trend: 10,11  -Negative head CT  -Check echo, carotid US, orthostatic VS  -AM labs  -Telemetry overnight  -Consider ZIO patch at DC     L wrist contusion  -No fracture or dislocation on XR  -Pain control with Tylenol as needed  -Ice as needed  -ACE wrap     DM I  -A1c 6.9 2/2024  -Has continuous glucose monitor and insulin pump in place  -Follows with endocrinology  -DM diet  -Hypoglycemia protocol     HTN  -Continue home Norvasc and losartan  -Monitor VS q4hrs     BPPV  -Has not had any  issues with this recently  -Currently doing outpatient vestibular rehab     Seizure disorder  -Continue home Keppra dose  -No recent seizures     GERD  -Continue home PPI     Hypothyroidism  -Continue home levothyroxine     Urinary incontinence  -Continue oxybutynin     DVT prophylaxis   -Hep subcutaneous    Plan 3/21: Patient remains asymptomatic.  With no further events of syncope.  Normal echo.  Normal carotid ultrasound.  At this time patient is safe for discharge and will follow-up with her PCP.        I spent 20 minutes in the professional and overall care of this patient.      Maeve Urrutia PA-C

## 2024-03-22 ENCOUNTER — PATIENT OUTREACH (OUTPATIENT)
Dept: PRIMARY CARE | Facility: CLINIC | Age: 79
End: 2024-03-22
Payer: MEDICARE

## 2024-03-22 ENCOUNTER — DOCUMENTATION (OUTPATIENT)
Dept: PRIMARY CARE | Facility: CLINIC | Age: 79
End: 2024-03-22
Payer: MEDICARE

## 2024-03-22 ENCOUNTER — TELEPHONE (OUTPATIENT)
Dept: PRIMARY CARE | Facility: CLINIC | Age: 79
End: 2024-03-22
Payer: MEDICARE

## 2024-03-22 NOTE — TELEPHONE ENCOUNTER
----- Message from Stephanie Bowman sent at 3/22/2024  9:27 AM EDT -----  Regarding: TCM Appointment    Can you please contact pt to schedule hosp  follow up within 14 days of discharge.  This patient was discharged from: [facility] diagnosis: Syncope and collapse    Date of discharge: 03/21/2024    Per discharge instructions patient is to follow up within 1 week of discharge. She is willing to see any physician in the office.          Thank you

## 2024-03-22 NOTE — PROGRESS NOTES
Discharge Facility: [facility]  Discharge Diagnosis: Syncope and collapse  Admission Date: 03/20/2024  Discharge Date: 03/21/2024    PCP Appointment Date: Tasked to office  Specialist Appointment Date: Endo 06/06/2024  Hospital Encounter and Summary: Linked    See discharge assessment below for further details      Engagement  Call Start Time: 0920 (3/22/2024  9:22 AM)    Medications  Medications reviewed with patient/caregiver?: Yes (3/22/2024  9:22 AM)  Is the patient having any side effects they believe may be caused by any medication additions or changes?: No (3/22/2024  9:22 AM)  Does the patient have all medications ordered at discharge?: Yes (3/22/2024  9:22 AM)  Prescription Comments: No new scripts given at discharge (3/22/2024  9:22 AM)  Is the patient taking all medications as directed (includes completed medication regime)?: Yes (3/22/2024  9:22 AM)  Medication Comments: Patient denies any issues affording medication (3/22/2024  9:22 AM)    Appointments  Does the patient have a primary care provider?: Yes (3/22/2024  9:22 AM)  Care Management Interventions: -- (Tasked to office) (3/22/2024  9:22 AM)  Has the patient kept scheduled appointments due by today?: Yes (3/22/2024  9:22 AM)    Self Management  What is the home health agency?: N/A (3/22/2024  9:22 AM)  What Durable Medical Equipment (DME) was ordered?: N/A (3/22/2024  9:22 AM)    Patient Teaching  Does the patient have access to their discharge instructions?: Yes (3/22/2024  9:22 AM)  Care Management Interventions: Reviewed instructions with patient (3/22/2024  9:22 AM)  What is the patient's perception of their health status since discharge?: Improving (3/22/2024  9:22 AM)  Is the patient/caregiver able to teach back the hierarchy of who to call/visit for symptoms/problems? PCP, Specialist, Home Health nurse, Urgent Care, ED, 911: Yes (3/22/2024  9:22 AM)  Patient/Caregiver Education Comments: CM spoke to patient via phone. She states that she is  doing okay at home. She admits to fear of falling in the home. PCP follow up appointment has been tasked to the office. No new scripts were given upon discharge. She was thankful for this call. (3/22/2024  9:22 AM)

## 2024-03-26 ENCOUNTER — CLINICAL SUPPORT (OUTPATIENT)
Dept: ENDOCRINOLOGY | Facility: CLINIC | Age: 79
End: 2024-03-26
Payer: MEDICARE

## 2024-03-26 VITALS
WEIGHT: 152.4 LBS | SYSTOLIC BLOOD PRESSURE: 139 MMHG | DIASTOLIC BLOOD PRESSURE: 62 MMHG | HEART RATE: 59 BPM | BODY MASS INDEX: 27 KG/M2

## 2024-03-26 DIAGNOSIS — I10 ESSENTIAL HYPERTENSION: ICD-10-CM

## 2024-03-26 DIAGNOSIS — E10.65 TYPE 1 DIABETES MELLITUS WITH HYPERGLYCEMIA (MULTI): Primary | ICD-10-CM

## 2024-03-26 DIAGNOSIS — E78.2 MIXED HYPERLIPIDEMIA: ICD-10-CM

## 2024-03-26 PROCEDURE — 99214 OFFICE O/P EST MOD 30 MIN: CPT | Performed by: INTERNAL MEDICINE

## 2024-03-26 PROCEDURE — 95251 CONT GLUC MNTR ANALYSIS I&R: CPT | Performed by: INTERNAL MEDICINE

## 2024-03-26 NOTE — PROGRESS NOTES
HPI     Presents for follow up/metabolic management 79 yo with DM Type 1 diagnosed at age 27. History HTN, HLD, Hypothyroidism, GERD and Seizures.  A1c- 7.2% last visit earlier this month.  Now has Guardian sensor, updated 780G. Following carb controlled diet and knows reasonable carb allowances. Patient able to afford their medications. Patient is exercising/very active.    -CGM   Guardian smart guard with Mini-Medtronic 780G     -HTN   Losartan, Amlodipine daily at goal/not at goal med/dose    -STATIN   Simvastatin 20mg LDLCALC 70    -PUMP:  medtronic 780G with guardian cgm in automode   Basal  0000  0.50  0300  0.70  0600  0.60  2000  0.95  ICR    0000  1:26   0700  1:18  ISF    0000  50  TARGET   0000 100-120      [facility]   72% in target,  0% lows,  patterns, often seeing postprandial excursions into 200 range,  otherwise mostly mid to high 100's waking & throughout day   Fear of lows,  treats if/when sugar drops <110        Current Outpatient Medications:     acetaminophen (Tylenol) 500 mg tablet, Take by mouth every 6 hours if needed for mild pain (1 - 3)., Disp: , Rfl:     amLODIPine (Norvasc) 5 mg tablet, Take 1 tablet (5 mg) by mouth once daily., Disp: , Rfl:     blood sugar diagnostic (Accu-Chek Guide test strips) strip, USE TO TEST 10 TIMES DAILY, Disp: 900 strip, Rfl: 3    calcium carb/magnesium hydrox (ROLAIDS ORAL), Take by mouth., Disp: , Rfl:     insulin aspart, with niacinamide, (Fiasp U-100 Insulin) 100 unit/mL injection, UP  UNITS DAILY VIA INSULIN PUMP, Disp: 90 mL, Rfl: 3    levETIRAcetam (Keppra) 750 mg tablet, Take 1 tablet (750 mg) by mouth 2 times a day., Disp: , Rfl:     LevoxyL 137 mcg tablet, Take 1 tablet (137 mcg) by mouth once daily in the morning. Take before meals., Disp: , Rfl:     losartan (Cozaar) 100 mg tablet, Take 1 tablet (100 mg) by mouth once daily., Disp: , Rfl:     meclizine (Antivert) 25 mg tablet, Take 1 tablet (25 mg) by mouth  3 times a day as needed for dizziness or nausea., Disp: , Rfl:     multivitamin tablet, Take 1 tablet by mouth once daily., Disp: , Rfl:     oxybutynin XL (Ditropan-XL) 10 mg 24 hr tablet, Take 1 tablet (10 mg) by mouth once daily., Disp: , Rfl:     pantoprazole (ProtoNix) 40 mg EC tablet, Take 1 tablet (40 mg) by mouth once daily in the morning. Take before meals., Disp: , Rfl:     simvastatin (Zocor) 20 mg tablet, Take 1 tablet (20 mg) by mouth once daily., Disp: , Rfl:     sodium chloride (Ocean) 0.65 % nasal spray, Administer 1 spray into each nostril if needed for congestion., Disp: , Rfl:     Allergies as of 03/26/2024 - Reviewed 03/26/2024   Allergen Reaction Noted    Sulfa (sulfonamide antibiotics) Hives 09/02/2023    Sulfamethoxazole-trimethoprim Hives 09/02/2023    Erythromycin Other 09/02/2023    Amoxicillin-pot clavulanate Diarrhea 09/02/2023       /62   Pulse 59   Wt 69.1 kg (152 lb 6.4 oz)   BMI 27.00 kg/m²     Labs:   Lab Results   Component Value Date    WBC 5.8 03/21/2024    NRBC 0.0 03/21/2024    RBC 4.75 03/21/2024    HGB 14.4 03/21/2024    HCT 44.0 03/21/2024     03/21/2024     Lab Results   Component Value Date    CALCIUM 8.7 03/21/2024    AST 14 03/21/2024    ALKPHOS 80 03/21/2024    BILITOT 1.1 03/21/2024    PROT 5.9 03/21/2024    ALBUMIN 3.8 03/21/2024    GLOB 2.2 04/21/2023    AGR 1.8 04/21/2023     03/21/2024    K 4.0 03/21/2024     03/21/2024    CO2 25 03/21/2024    ANIONGAP 9 03/21/2024    BUN 17 03/21/2024    CREATININE 0.70 03/21/2024    UREACREAUR 20.0 04/21/2023    GLUCOSE 252 (H) 03/21/2024    ALT 15 03/21/2024    EGFR 90 03/21/2024     Lab Results   Component Value Date    CHOL 164 01/10/2024    TRIG 65 01/10/2024    HDL 81.0 01/10/2024    LDLCALC 70 01/10/2024     Lab Results   Component Value Date    MICROALBCREA 8.9 04/21/2023     Lab Results   Component Value Date    TSH 2.19 01/10/2024     Lab Results   Component Value Date    ERCFMCBG84 404  01/09/2023     Lab Results   Component Value Date    HGBA1C 7.2 (A) 03/05/2024         Assessment/Plan   1. Type 1 diabetes mellitus with hyperglycemia (CMS/[facility])      Hyperglycemia postprandial,  intensify icr's     Adjust bg target from 100(120) to 120(120) given pts fear of low blood sugars     Still getting used to using guardian sensor / automode,  longtime medtronic pump user though up until recently had not used any cgm        2. Mixed hyperlipidemia  On statin,  tolerating   Reviewed labs     3. Essential hypertension  At target on therapy       Follow up: June as previously scheduled w/ Agnieszka    -labs/tests/notes reviewed  -reviewed and counseled patient on medication monitoring and side effects    Treatment and plan discussed with Dr. Pérez.  JILL Gibson, PharmD, BC-[facility], [facility] [place].     Medical Decision Making  Complexity of problem: Chronic illness of diabetes mellitus uncontrolled, progressing  Data analyzed and reviewed: Reviewed prior notes, blood glucose data, labs including HgbA1c, lipids, serum chemistries.  Ordered tests.   Risk of complications and morbidities: Is definite because of use of insulin and risk of hypoglycemia.  Prescription medications reviewed and modifications made.  Compliance assessed.  Addressed social determinants of health including food insecurity.

## 2024-03-26 NOTE — PATIENT INSTRUCTIONS
Changes made to pump settings:   - carb ratio 12a from 1:26 to now 1:20  - carb ratio 7a from 1:18 to now 1:15   - blood glucose target from 100-120 to now 120-120

## 2024-03-26 NOTE — PROGRESS NOTES
I, Dr eKl Pérez, have reviewed this progress note, medication list, vital signs, any pertinent lab values, and any CGM data if present with the [facility] and  face to face during this visit today. This note reflects the treatment plan that was made under my direction after reviewing the above mentioned elements while face to face with the patient and CDE.  I personally answered and addressed any questions and concerns the patient had during the visit today.  The CDE entered the data in this note under my direction and I personally reviewed it, signed any lab or medication orders that I instructed to be completed. I am the billing provider for this visit and the level of service was determined by my involvement in the Medical Decision Making Component of this visit while face to face with the patient.    Electronically signed by Kel Pérez MD on 3/26/24 at 3:23 PM.

## 2024-03-28 ENCOUNTER — OFFICE VISIT (OUTPATIENT)
Dept: PRIMARY CARE | Facility: CLINIC | Age: 79
End: 2024-03-28
Payer: MEDICARE

## 2024-03-28 VITALS
HEIGHT: 63 IN | TEMPERATURE: 98 F | WEIGHT: 154 LBS | SYSTOLIC BLOOD PRESSURE: 122 MMHG | OXYGEN SATURATION: 98 % | HEART RATE: 64 BPM | DIASTOLIC BLOOD PRESSURE: 60 MMHG | BODY MASS INDEX: 27.29 KG/M2

## 2024-03-28 DIAGNOSIS — R55 SYNCOPE AND COLLAPSE: Primary | ICD-10-CM

## 2024-03-28 PROBLEM — I35.1 NONRHEUMATIC AORTIC VALVE INSUFFICIENCY: Status: ACTIVE | Noted: 2024-03-28

## 2024-03-28 PROBLEM — R32 URINARY INCONTINENCE: Status: ACTIVE | Noted: 2024-03-28

## 2024-03-28 PROBLEM — R42 DIZZINESS: Status: ACTIVE | Noted: 2023-09-02

## 2024-03-28 PROCEDURE — 1160F RVW MEDS BY RX/DR IN RCRD: CPT | Performed by: LICENSED PRACTICAL NURSE

## 2024-03-28 PROCEDURE — 1125F AMNT PAIN NOTED PAIN PRSNT: CPT | Performed by: LICENSED PRACTICAL NURSE

## 2024-03-28 PROCEDURE — 99495 TRANSJ CARE MGMT MOD F2F 14D: CPT | Performed by: LICENSED PRACTICAL NURSE

## 2024-03-28 PROCEDURE — 3078F DIAST BP <80 MM HG: CPT | Performed by: LICENSED PRACTICAL NURSE

## 2024-03-28 PROCEDURE — 3074F SYST BP LT 130 MM HG: CPT | Performed by: LICENSED PRACTICAL NURSE

## 2024-03-28 PROCEDURE — 1036F TOBACCO NON-USER: CPT | Performed by: LICENSED PRACTICAL NURSE

## 2024-03-28 PROCEDURE — 1159F MED LIST DOCD IN RCRD: CPT | Performed by: LICENSED PRACTICAL NURSE

## 2024-03-28 ASSESSMENT — ENCOUNTER SYMPTOMS: DIZZINESS: 1

## 2024-03-28 ASSESSMENT — PAIN SCALES - GENERAL: PAINLEVEL: 4

## 2024-03-28 NOTE — PROGRESS NOTES
[facility]: [facility]  PROGRESS NOTE      Pat Jaimes is a 78 y.o. female that is presenting today for Hospital Follow-up.      Subjective   Pt presents to the office today for follow up on her IP stay related to an syncopal episode. She reports fainting and falling in the bathroom in her home where she hear head and injured her left wrist. She was given and Echo which showed normal estimated ejection fraction with mild aortic valve regurgitation. She had an U/S of the neck which was negative as well as an EKG that showed Sinus Bradycardia, 59 BPM. She was given a CT of C-spine that showed no fracture or dislocation of cervical spine. A CT of the head showed mild age-appropriate atrophy with postoperative change from left parietal craniotomy without acute intracranial process, with a noted stable 9 mm meningioma. Her CXR no acute cardiopulmonary disease. And her lab work was benign aside from BS in the mid 200s. Mrs. Ruby' shares that her BS was in the 130s at the time of her fall. Mrs. Ruby was seen by Cardiology who recommended follow up if it was suggested by her PCP.    Today Mrs. Jaimes's shares that her dizziness remains. She shares that she was previously seen by an ENT related to her dizziness who subsequently discontinued her meclizine as she it reportedly worsened her symptoms. She was also receiving vestibular therapy related to the dizziness at the time of the fall. She shares that her syncopal episodes remains, her therapy is not yet complete. She was also scheduled to see Neurology next week. Mrs. Ruby shares that her blood sugars run as low as 59.  She is followed by Endocrinology.           Review of Systems   Neurological:  Positive for dizziness.      Objective   Vitals:    03/28/24 1331   BP: 122/60   Pulse: 64   Temp: 36.7 °C (98 °F)   SpO2: 98%      Body mass index is 27.29 kg/m².  Physical Exam  Vitals reviewed.   Constitutional:       General: She is not  "in acute distress.     Appearance: She is not ill-appearing, toxic-appearing or diaphoretic.   Cardiovascular:      Rate and Rhythm: Normal rate and regular rhythm.   Pulmonary:      Effort: Pulmonary effort is normal. No respiratory distress.      Breath sounds: Normal breath sounds. No decreased breath sounds, wheezing, rhonchi or rales.   Abdominal:      General: Bowel sounds are normal.      Tenderness: There is no abdominal tenderness.   Skin:     General: Skin is dry.       Diagnostic Results   Lab Results   Component Value Date    GLUCOSE 252 (H) 03/21/2024    CALCIUM 8.7 03/21/2024     03/21/2024    K 4.0 03/21/2024    CO2 25 03/21/2024     03/21/2024    BUN 17 03/21/2024    CREATININE 0.70 03/21/2024     Lab Results   Component Value Date    ALT 15 03/21/2024    AST 14 03/21/2024    ALKPHOS 80 03/21/2024    BILITOT 1.1 03/21/2024     Lab Results   Component Value Date    WBC 5.8 03/21/2024    HGB 14.4 03/21/2024    HCT 44.0 03/21/2024    MCV 93 03/21/2024     03/21/2024     Lab Results   Component Value Date    CHOL 164 01/10/2024    CHOL 140 04/21/2023    CHOL 168 06/14/2022     Lab Results   Component Value Date    HDL 81.0 01/10/2024    HDL 72 04/21/2023    HDL 82 06/14/2022     Lab Results   Component Value Date    LDLCALC 70 01/10/2024    LDLCALC 52 (L) 04/21/2023    LDLCALC 75 06/14/2022     Lab Results   Component Value Date    TRIG 65 01/10/2024    TRIG 79 04/21/2023    TRIG 54 06/14/2022     No components found for: \"CHOLHDL\"  Lab Results   Component Value Date    HGBA1C 7.2 (A) 03/05/2024     Other labs not included in the list above were reviewed either before or during this encounter.    History    Past Medical History:   Diagnosis Date    Diabetes mellitus type I (CMS/HCC)     Hyperlipidemia     Hypertension     Hypothyroidism     Insulin pump in place     Other specified diabetes mellitus without complications (CMS/HCC)     Diabetes mellitus of other type without " complication    Personal history of other diseases of the circulatory system     History of hypertension    Seizure disorder (CMS/HCC)      Past Surgical History:   Procedure Laterality Date    BI STEREOTACTIC GUIDED BREAST RIGHT LOCALIZATION AND BIOPSY Right 02/02/2018    BI STEREOTACTIC GUIDED BREAST LOCALIZATION AND BIOPSY RIGHT [facility]    KNEE SURGERY Right 1989    ARTHROSCOPY    OTHER SURGICAL HISTORY  05/25/2021    Craniotomy    TUBAL LIGATION Bilateral 1982     Family History   Problem Relation Name Age of Onset    Diabetes Mother      Heart disease Mother      Heart disease Father       Social History     Socioeconomic History    Marital status:      Spouse name: Not on file    Number of children: Not on file    Years of education: Not on file    Highest education level: Not on file   Occupational History    Not on file   Tobacco Use    Smoking status: Never     Passive exposure: Never    Smokeless tobacco: Never   Vaping Use    Vaping Use: Never used   Substance and Sexual Activity    Alcohol use: Not Currently    Drug use: Never    Sexual activity: Not on file   Other Topics Concern    Not on file   Social History Narrative    Not on file     Social Determinants of Health     Financial Resource Strain: Low Risk  (3/20/2024)    Overall Financial Resource Strain (CARDIA)     Difficulty of Paying Living Expenses: Not hard at all   Food Insecurity: No Food Insecurity (3/20/2024)    Hunger Vital Sign     Worried About Running Out of Food in the Last Year: Never true     Ran Out of Food in the Last Year: Never true   Transportation Needs: No Transportation Needs (3/20/2024)    PRAPARE - Transportation     Lack of Transportation (Medical): No     Lack of Transportation (Non-Medical): No   Physical Activity: Inactive (3/20/2024)    Exercise Vital Sign     Days of Exercise per Week: 0 days     Minutes of Exercise per Session: 0 min   Stress: No Stress Concern Present (3/20/2024)    [employer] - Occupational Stress Questionnaire     Feeling of Stress : Not at all   Social Connections: Socially Isolated (3/20/2024)    Social Connection and Isolation Panel [NHANES]     Frequency of Communication with Friends and Family: More than three times a week     Frequency of Social Gatherings with Friends and Family: Never     Attends Anabaptist Services: Never     Active Member of Clubs or Organizations: No     Attends Club or Organization Meetings: Never     Marital Status:    Intimate Partner Violence: Not At Risk (3/20/2024)    Humiliation, Afraid, Rape, and Kick questionnaire     Fear of Current or Ex-Partner: No     Emotionally Abused: No     Physically Abused: No     Sexually Abused: No   Housing Stability: Low Risk  (3/20/2024)    Housing Stability Vital Sign     Unable to Pay for Housing in the Last Year: No     Number of Places Lived in the Last Year: 1     Unstable Housing in the Last Year: No     Allergies   Allergen Reactions    Sulfa (Sulfonamide Antibiotics) Hives    Sulfamethoxazole-Trimethoprim Hives    Erythromycin Other    Amoxicillin-Pot Clavulanate Diarrhea     Current Outpatient Medications on File Prior to Visit   Medication Sig Dispense Refill    amLODIPine (Norvasc) 5 mg tablet Take 1 tablet (5 mg) by mouth once daily.      blood sugar diagnostic (Accu-Chek Guide test strips) strip USE TO TEST 10 TIMES DAILY 900 strip 3    insulin aspart, with niacinamide, (Fiasp U-100 Insulin) 100 unit/mL injection UP  UNITS DAILY VIA INSULIN PUMP 90 mL 3    levETIRAcetam (Keppra) 750 mg tablet Take 1 tablet (750 mg) by mouth 2 times a day.      LevoxyL 137 mcg tablet Take 1 tablet (137 mcg) by mouth once daily in the morning. Take before meals.      losartan (Cozaar) 100 mg tablet Take 1 tablet (100 mg) by mouth once daily.      oxybutynin XL (Ditropan-XL) 10 mg 24 hr tablet Take 1 tablet (10 mg) by mouth once daily.      pantoprazole (ProtoNix) 40 mg EC tablet  Take 1 tablet (40 mg) by mouth once daily in the morning. Take before meals.      simvastatin (Zocor) 20 mg tablet Take 1 tablet (20 mg) by mouth once daily.      [DISCONTINUED] acetaminophen (Tylenol) 500 mg tablet Take by mouth every 6 hours if needed for mild pain (1 - 3).      [DISCONTINUED] calcium carb/magnesium hydrox (ROLAIDS ORAL) Take by mouth.      [DISCONTINUED] meclizine (Antivert) 25 mg tablet Take 1 tablet (25 mg) by mouth 3 times a day as needed for dizziness or nausea.      [DISCONTINUED] multivitamin tablet Take 1 tablet by mouth once daily.      [DISCONTINUED] sodium chloride (Ocean) 0.65 % nasal spray Administer 1 spray into each nostril if needed for congestion.       No current facility-administered medications on file prior to visit.     Immunization History   Administered Date(s) Administered    Flu vaccine, quadrivalent, high-dose, preservative free, age 65y+ (FLUZONE) 09/11/2020, 09/28/2020, 10/04/2021, 09/19/2022, 09/29/2023    Influenza, High Dose Seasonal, Preservative Free 10/02/2018    Influenza, injectable, quadrivalent 10/30/2017, 09/26/2019, 09/30/2022    Influenza, seasonal, injectable 10/01/2012, 10/01/2014, 10/15/2014, 09/24/2015    Influenza, seasonal, injectable, preservative free 10/06/2008    Novel influenza-H1N1-09, preservative-free 11/15/2009    Pfizer COVID-19 vaccine, Fall 2023, 12 years and older, (30mcg/0.3mL) 12/12/2023    Pfizer COVID-19 vaccine, bivalent, age 12 years and older (30 mcg/0.3 mL) 12/19/2022    Pfizer Purple Cap SARS-CoV-2 03/04/2021, 04/01/2021, 11/08/2021    Pneumococcal conjugate vaccine, 13-valent (PREVNAR 13) 11/22/2012    RESPIRATORY SYNCYTIAL VIRUS (RSV), ELIGIBLE PREGNANT PTS, 0.5 ML (ABRYSVO) 09/29/2023    Tdap vaccine, age 7 year and older (BOOSTRIX, ADACEL) 06/11/2016    Zoster, live 12/11/2009     Patient's medical history was reviewed and updated either before or during this encounter.       Assessment/Plan   Problem List Items Addressed  This Visit    None    Her BP is stable. She continues to wear a CGM with BS ranging from 59 to   I recommended contacting her Endocrinologist regarding her low BS readings.   I also recommended that she follow up with Cardiology regarding her newly diagnosed mild aortic valve regurgitation. She will continue receiving vestibular therapy and plans on seeing Neurology next week.   Estelle Benson, MELVA-CNP

## 2024-04-02 ENCOUNTER — PATIENT OUTREACH (OUTPATIENT)
Dept: PRIMARY CARE | Facility: CLINIC | Age: 79
End: 2024-04-02
Payer: MEDICARE

## 2024-04-09 ENCOUNTER — TREATMENT (OUTPATIENT)
Dept: PHYSICAL THERAPY | Facility: CLINIC | Age: 79
End: 2024-04-09
Payer: MEDICARE

## 2024-04-09 DIAGNOSIS — H81.12 BPPV (BENIGN PAROXYSMAL POSITIONAL VERTIGO), LEFT: ICD-10-CM

## 2024-04-09 DIAGNOSIS — Z87.898 HISTORY OF VERTIGO: ICD-10-CM

## 2024-04-09 DIAGNOSIS — R26.89 IMBALANCE: ICD-10-CM

## 2024-04-09 PROCEDURE — 97112 NEUROMUSCULAR REEDUCATION: CPT | Mod: GP | Performed by: PHYSICAL THERAPIST

## 2024-04-09 NOTE — PROGRESS NOTES
"Physical Therapy Treatment    Patient Name: Pat Jaimes  MRN: [id number]  Encounter date:  4/9/2024  Time Calculation  Start Time: 1530  Stop Time: 1600  Time Calculation (min): 30 min     PT Therapeutic Procedures Time Entry  Neuromuscular Re-Education Time Entry: 30    Visit Number:  6 (including evaluation)  Planned total visits: 10  Visit Authorized:  MEDICARE A&B, 240 DED, 80% COVERAGE, [place], [employer] ACTIVE SECONDARY    Current Problem  1. History of vertigo  Follow Up In Physical Therapy      2. BPPV (benign paroxysmal positional vertigo), left  Follow Up In Physical Therapy      3. Imbalance  Follow Up In Physical Therapy          Surgery  Remote craniotomy     Precautions      Fall risk due to vertigo, DM.  Meningioma on brain, Sz hx.     Pain/dizzy/nausea:   0/0/0 now     Location knees  Description ache  Subjective  General     Pt had passed out on the toilet and went to the hospitals and had MRI, carotid US, etc.  States everything was - test wise.  Pt PCP NP note states  \"She will continue receiving vestibular therapy and plans on seeing Neurology next week.   Estelle Benson, APRN-CNP.\"  Ok To resume PT per this statement.  Pt states neuro encouraged more fluids.    Objective  Prior:  MCTSIB 3/4  - B Roll tests  - [place]-Hallpike   Mild + HIT to L  - H smooth pursuits  - B [place]-Hallpike    Treatment:     Seated:    Gaze stabilization with:  Gait in bars V and H HT's, vor, backward, F, s/s  [place] turns 2 x 2 ea  STS 2 x 10  VOR cancellation with convergence    Stand on foam:  EO 60\"  EC 60\"  V and H HT's x10 ea  Convergence x 15, and also with VOR  F and lat step ups x 10 ea on foam      Stand on wedge to control retropulsion:  EO, EC 1' ea, V and H Ht's x 10 ea    DNP:  Seated on plinth:  VOR H AND V, 2 X 30\" ea  Saccades V, H and D x 10 ea  Heel/toe rocks x 20  March x 20  Morris string 60\" DNP  Busy pattern-DNP  DVA-DNP  VOR cancellation to metronome at 50 bps     Activity tolerance:  good    OP " "EDUCATION:  Continue prior HEP:   Use rocking chair and rec bike (start 5') at home  diaphragmatic breathing  HEP:    Assessment:     Pt's response to treatment:  pt with good stability with gait today, some dizziness with [place] turns  Areas of improvements:  vertigo  Limitations/deficits:  sensory integration    Pain end of session:  NO INCR, no D/N    Plan:     Continue with current POC with the following changes balance and gaze stabilization focus , issue LE strengthening HEP  Continued PT required to reach all goals and restore function  Assessment of current progress against goals:  Progressing toward functional goals    Goals:      STG:   Pt to be I in initial HEP.     LTG\"s:  - BPPV testing  Improve score on outcome form by 10 points to show incr in function.  Decr max dizziness level to 2/10 for community activity.  MCTSIB 4/4 for incr sensory integration.         "

## 2024-04-11 DIAGNOSIS — I10 ESSENTIAL HYPERTENSION: ICD-10-CM

## 2024-04-11 RX ORDER — AMLODIPINE BESYLATE 5 MG/1
5 TABLET ORAL DAILY
Qty: 90 TABLET | Refills: 3 | Status: SHIPPED | OUTPATIENT
Start: 2024-04-11

## 2024-04-16 ENCOUNTER — OFFICE VISIT (OUTPATIENT)
Dept: PRIMARY CARE | Facility: CLINIC | Age: 79
End: 2024-04-16
Payer: MEDICARE

## 2024-04-16 VITALS
OXYGEN SATURATION: 99 % | HEIGHT: 63 IN | WEIGHT: 157 LBS | TEMPERATURE: 97.7 F | SYSTOLIC BLOOD PRESSURE: 124 MMHG | HEART RATE: 60 BPM | BODY MASS INDEX: 27.82 KG/M2 | DIASTOLIC BLOOD PRESSURE: 60 MMHG

## 2024-04-16 DIAGNOSIS — Z01.89 ENCOUNTER FOR ROUTINE LABORATORY TESTING: ICD-10-CM

## 2024-04-16 DIAGNOSIS — E55.9 VITAMIN D DEFICIENCY: ICD-10-CM

## 2024-04-16 DIAGNOSIS — E10.69 TYPE 1 DIABETES MELLITUS WITH OTHER SPECIFIED COMPLICATION (MULTI): ICD-10-CM

## 2024-04-16 DIAGNOSIS — E78.2 MIXED HYPERLIPIDEMIA: ICD-10-CM

## 2024-04-16 DIAGNOSIS — I10 PRIMARY HYPERTENSION: ICD-10-CM

## 2024-04-16 DIAGNOSIS — G40.909 SEIZURE DISORDER (MULTI): ICD-10-CM

## 2024-04-16 DIAGNOSIS — M81.0 AGE-RELATED OSTEOPOROSIS WITHOUT CURRENT PATHOLOGICAL FRACTURE: ICD-10-CM

## 2024-04-16 DIAGNOSIS — E06.3 HYPOTHYROIDISM DUE TO HASHIMOTO'S THYROIDITIS: ICD-10-CM

## 2024-04-16 DIAGNOSIS — R55 SYNCOPE, UNSPECIFIED SYNCOPE TYPE: Primary | ICD-10-CM

## 2024-04-16 DIAGNOSIS — K21.9 GASTROESOPHAGEAL REFLUX DISEASE WITHOUT ESOPHAGITIS: ICD-10-CM

## 2024-04-16 DIAGNOSIS — E03.8 HYPOTHYROIDISM DUE TO HASHIMOTO'S THYROIDITIS: ICD-10-CM

## 2024-04-16 PROBLEM — R42 DIZZINESS: Status: RESOLVED | Noted: 2023-09-02 | Resolved: 2024-04-16

## 2024-04-16 PROBLEM — H90.5 SNHL (SENSORINEURAL HEARING LOSS): Status: ACTIVE | Noted: 2023-09-02

## 2024-04-16 PROBLEM — R26.89 IMPAIRMENT OF BALANCE: Status: RESOLVED | Noted: 2024-03-07 | Resolved: 2024-04-16

## 2024-04-16 PROBLEM — E10.9 TYPE 1 DIABETES MELLITUS (MULTI): Status: ACTIVE | Noted: 2024-04-16

## 2024-04-16 PROBLEM — Z78.0 MENOPAUSE: Status: ACTIVE | Noted: 2023-09-02

## 2024-04-16 PROBLEM — E78.5 HLD (HYPERLIPIDEMIA): Status: ACTIVE | Noted: 2023-09-02

## 2024-04-16 PROCEDURE — 3074F SYST BP LT 130 MM HG: CPT | Performed by: STUDENT IN AN ORGANIZED HEALTH CARE EDUCATION/TRAINING PROGRAM

## 2024-04-16 PROCEDURE — G2211 COMPLEX E/M VISIT ADD ON: HCPCS | Performed by: STUDENT IN AN ORGANIZED HEALTH CARE EDUCATION/TRAINING PROGRAM

## 2024-04-16 PROCEDURE — 3078F DIAST BP <80 MM HG: CPT | Performed by: STUDENT IN AN ORGANIZED HEALTH CARE EDUCATION/TRAINING PROGRAM

## 2024-04-16 PROCEDURE — 99214 OFFICE O/P EST MOD 30 MIN: CPT | Performed by: STUDENT IN AN ORGANIZED HEALTH CARE EDUCATION/TRAINING PROGRAM

## 2024-04-16 PROCEDURE — 1159F MED LIST DOCD IN RCRD: CPT | Performed by: STUDENT IN AN ORGANIZED HEALTH CARE EDUCATION/TRAINING PROGRAM

## 2024-04-16 PROCEDURE — 1160F RVW MEDS BY RX/DR IN RCRD: CPT | Performed by: STUDENT IN AN ORGANIZED HEALTH CARE EDUCATION/TRAINING PROGRAM

## 2024-04-16 PROCEDURE — 1036F TOBACCO NON-USER: CPT | Performed by: STUDENT IN AN ORGANIZED HEALTH CARE EDUCATION/TRAINING PROGRAM

## 2024-04-16 PROCEDURE — 1126F AMNT PAIN NOTED NONE PRSNT: CPT | Performed by: STUDENT IN AN ORGANIZED HEALTH CARE EDUCATION/TRAINING PROGRAM

## 2024-04-16 ASSESSMENT — ENCOUNTER SYMPTOMS
CONSTITUTIONAL NEGATIVE: 1
CARDIOVASCULAR NEGATIVE: 1
GASTROINTESTINAL NEGATIVE: 1
RESPIRATORY NEGATIVE: 1

## 2024-04-16 ASSESSMENT — PAIN SCALES - GENERAL: PAINLEVEL: 0-NO PAIN

## 2024-04-16 ASSESSMENT — PATIENT HEALTH QUESTIONNAIRE - PHQ9
SUM OF ALL RESPONSES TO PHQ9 QUESTIONS 1 AND 2: 0
1. LITTLE INTEREST OR PLEASURE IN DOING THINGS: NOT AT ALL
2. FEELING DOWN, DEPRESSED OR HOPELESS: NOT AT ALL

## 2024-04-16 NOTE — PROGRESS NOTES
[facility]: [facility]  PROGRESS NOTE      Pat Jaimes is a 78 y.o. female that is presenting today for Follow-up.    Assessment/Plan   Diagnoses and all orders for this visit:  Syncope, unspecified syncope type  -     Holter or Event Cardiac Monitor; Future  Type 1 diabetes mellitus with other specified complication (Multi)  Age-related osteoporosis without current pathological fracture  Gastroesophageal reflux disease without esophagitis  Hypothyroidism due to Hashimoto's thyroiditis  Seizure disorder (Multi)  Mixed hyperlipidemia  Primary hypertension  Vitamin D deficiency    - Significant medication and problem list reconciliation done today.  - Vitals look great. Do not need to make changes at this time.  - Patient was in the hospital recently following a syncopal event. Patient was in the bathroom brushing her teeth, was going to go to the bathroom, and then passed out. Patient denies warning signs prior to the event. Cardiac evaluation, including echocardiogram and carotid ultrasound, was normal. Neurologic evaluation, including CT and MRI of the brain, was also normal.   - At this point, the diagnosis includes vasovagal syncope // dehydration (most likely) vs arrhythmia vs seizure.   - The only piece of the workup left that was not done was the cardiac event monitor. Ordered today.  - Continue evaluation per cardiology and neurology. Nothing else needed at this time.  - Will order labwork for the patient's next appointment.    Subjective   - The patient otherwise feels well and denies any acute symptoms or concerns at this time.  - The patient denies any changes or progression of their chronic medical problems.  - The patient denies any problems or concerns with their medications.      Review of Systems   Constitutional: Negative.    Respiratory: Negative.     Cardiovascular: Negative.    Gastrointestinal: Negative.    All other systems reviewed and are negative.     Objective  "  Vitals:    04/16/24 1556   BP: 124/60   Pulse: 60   Temp: 36.5 °C (97.7 °F)   SpO2: 99%      Body mass index is 27.82 kg/m².  Physical Exam  Vitals and nursing note reviewed.   Constitutional:       General: She is not in acute distress.  Neck:      Vascular: No carotid bruit.   Cardiovascular:      Rate and Rhythm: Normal rate and regular rhythm.      Heart sounds: Normal heart sounds.   Pulmonary:      Effort: Pulmonary effort is normal.      Breath sounds: Normal breath sounds.   Musculoskeletal:         General: No swelling.   Neurological:      Mental Status: She is alert. Mental status is at baseline.   Psychiatric:         Mood and Affect: Mood normal.       Diagnostic Results   Lab Results   Component Value Date    GLUCOSE 252 (H) 03/21/2024    CALCIUM 8.7 03/21/2024     03/21/2024    K 4.0 03/21/2024    CO2 25 03/21/2024     03/21/2024    BUN 17 03/21/2024    CREATININE 0.70 03/21/2024     Lab Results   Component Value Date    ALT 15 03/21/2024    AST 14 03/21/2024    ALKPHOS 80 03/21/2024    BILITOT 1.1 03/21/2024     Lab Results   Component Value Date    WBC 5.8 03/21/2024    HGB 14.4 03/21/2024    HCT 44.0 03/21/2024    MCV 93 03/21/2024     03/21/2024     Lab Results   Component Value Date    CHOL 164 01/10/2024    CHOL 140 04/21/2023    CHOL 168 06/14/2022     Lab Results   Component Value Date    HDL 81.0 01/10/2024    HDL 72 04/21/2023    HDL 82 06/14/2022     Lab Results   Component Value Date    LDLCALC 70 01/10/2024    LDLCALC 52 (L) 04/21/2023    LDLCALC 75 06/14/2022     Lab Results   Component Value Date    TRIG 65 01/10/2024    TRIG 79 04/21/2023    TRIG 54 06/14/2022     No components found for: \"CHOLHDL\"  Lab Results   Component Value Date    HGBA1C 7.2 (A) 03/05/2024     Other labs not included in the list above were reviewed either before or during this encounter.    History    Past Medical History:   Diagnosis Date    Diabetes mellitus type I (Multi)     " Hyperlipidemia     Hypertension     Hypothyroidism     Insulin pump in place     Other specified diabetes mellitus without complications (Multi)     Diabetes mellitus of other type without complication    Personal history of other diseases of the circulatory system     History of hypertension    Seizure disorder (Multi)      Past Surgical History:   Procedure Laterality Date    BI STEREOTACTIC GUIDED BREAST RIGHT LOCALIZATION AND BIOPSY Right 02/02/2018    BI STEREOTACTIC GUIDED BREAST LOCALIZATION AND BIOPSY RIGHT [facility]    KNEE SURGERY Right 1989    ARTHROSCOPY    OTHER SURGICAL HISTORY  05/25/2021    Craniotomy    TUBAL LIGATION Bilateral 1982     Family History   Problem Relation Name Age of Onset    Diabetes Mother      Heart disease Mother      Heart disease Father       Social History     Socioeconomic History    Marital status:      Spouse name: Not on file    Number of children: Not on file    Years of education: Not on file    Highest education level: Not on file   Occupational History    Not on file   Tobacco Use    Smoking status: Never     Passive exposure: Never    Smokeless tobacco: Never   Vaping Use    Vaping status: Never Used   Substance and Sexual Activity    Alcohol use: Not Currently    Drug use: Never    Sexual activity: Not on file   Other Topics Concern    Not on file   Social History Narrative    Not on file     Social Determinants of Health     Financial Resource Strain: Low Risk  (3/20/2024)    Overall Financial Resource Strain (CARDIA)     Difficulty of Paying Living Expenses: Not hard at all   Food Insecurity: No Food Insecurity (3/20/2024)    Hunger Vital Sign     Worried About Running Out of Food in the Last Year: Never true     Ran Out of Food in the Last Year: Never true   Transportation Needs: No Transportation Needs (3/20/2024)    PRAPARE - Transportation     Lack of Transportation (Medical): No     Lack of Transportation (Non-Medical): No   Physical Activity:  Inactive (3/20/2024)    Exercise Vital Sign     Days of Exercise per Week: 0 days     Minutes of Exercise per Session: 0 min   Stress: No Stress Concern Present (3/20/2024)    [place] [place] [employer] - Occupational Stress Questionnaire     Feeling of Stress : Not at all   Social Connections: Socially Isolated (3/20/2024)    Social Connection and Isolation Panel [NHANES]     Frequency of Communication with Friends and Family: More than three times a week     Frequency of Social Gatherings with Friends and Family: Never     Attends [employer]ism Services: Never     Active Member of Clubs or Organizations: No     Attends Club or Organization Meetings: Never     Marital Status:    Intimate Partner Violence: Not At Risk (3/20/2024)    Humiliation, Afraid, Rape, and Kick questionnaire     Fear of Current or Ex-Partner: No     Emotionally Abused: No     Physically Abused: No     Sexually Abused: No   Housing Stability: Low Risk  (3/20/2024)    Housing Stability Vital Sign     Unable to Pay for Housing in the Last Year: No     Number of Places Lived in the Last Year: 1     Unstable Housing in the Last Year: No     Allergies   Allergen Reactions    Sulfa (Sulfonamide Antibiotics) Hives    Sulfamethoxazole-Trimethoprim Hives    Erythromycin Other    Amoxicillin-Pot Clavulanate Diarrhea     Current Outpatient Medications on File Prior to Visit   Medication Sig Dispense Refill    amLODIPine (Norvasc) 5 mg tablet Take 1 tablet (5 mg) by mouth once daily. 90 tablet 3    blood sugar diagnostic (Accu-Chek Guide test strips) strip USE TO TEST 10 TIMES DAILY 900 strip 3    insulin aspart, with niacinamide, (Fiasp U-100 Insulin) 100 unit/mL injection UP  UNITS DAILY VIA INSULIN PUMP 90 mL 3    levETIRAcetam (Keppra) 750 mg tablet Take 1 tablet (750 mg) by mouth 2 times a day.      LevoxyL 137 mcg tablet Take 1 tablet (137 mcg) by mouth once daily in the morning. Take before meals.      losartan (Cozaar) 100  mg tablet Take 1 tablet (100 mg) by mouth once daily.      oxybutynin XL (Ditropan-XL) 10 mg 24 hr tablet Take 1 tablet (10 mg) by mouth once daily.      pantoprazole (ProtoNix) 40 mg EC tablet Take 1 tablet (40 mg) by mouth once daily in the morning. Take before meals.      patient supplied pump INSULIN PUMP- PATIENT SUPPLIED Inject 0-1 each (0-100 Units) under the skin.      simvastatin (Zocor) 20 mg tablet Take 1 tablet (20 mg) by mouth once daily.      [DISCONTINUED] amLODIPine (Norvasc) 5 mg tablet Take 1 tablet (5 mg) by mouth once daily.       No current facility-administered medications on file prior to visit.     Immunization History   Administered Date(s) Administered    Flu vaccine, quadrivalent, high-dose, preservative free, age 65y+ (FLUZONE) 09/11/2020, 09/28/2020, 10/04/2021, 09/19/2022, 09/29/2023    Influenza, High Dose Seasonal, Preservative Free 10/02/2018    Influenza, injectable, quadrivalent 10/30/2017, 09/26/2019, 09/30/2022    Influenza, seasonal, injectable 10/01/2012, 10/01/2014, 10/15/2014, 09/24/2015    Influenza, seasonal, injectable, preservative free 10/06/2008    Novel influenza-H1N1-09, preservative-free 11/15/2009    Pfizer COVID-19 vaccine, Fall 2023, 12 years and older, (30mcg/0.3mL) 12/12/2023    Pfizer COVID-19 vaccine, bivalent, age 12 years and older (30 mcg/0.3 mL) 12/19/2022    Pfizer Purple Cap SARS-CoV-2 03/04/2021, 04/01/2021, 11/08/2021    Pneumococcal conjugate vaccine, 13-valent (PREVNAR 13) 11/22/2012    RESPIRATORY SYNCYTIAL VIRUS (RSV), ELIGIBLE PREGNANT PTS, 0.5 ML (ABRYSVO) 09/29/2023    Tdap vaccine, age 7 year and older (BOOSTRIX, ADACEL) 06/11/2016    Zoster, live 12/11/2009     Patient's medical history was reviewed and updated either before or during this encounter.       En Yee MD

## 2024-04-17 ENCOUNTER — PATIENT OUTREACH (OUTPATIENT)
Dept: PRIMARY CARE | Facility: CLINIC | Age: 79
End: 2024-04-17
Payer: MEDICARE

## 2024-04-18 ENCOUNTER — TREATMENT (OUTPATIENT)
Dept: PHYSICAL THERAPY | Facility: CLINIC | Age: 79
End: 2024-04-18
Payer: MEDICARE

## 2024-04-18 DIAGNOSIS — H81.12 BPPV (BENIGN PAROXYSMAL POSITIONAL VERTIGO), LEFT: ICD-10-CM

## 2024-04-18 DIAGNOSIS — R26.89 IMBALANCE: ICD-10-CM

## 2024-04-18 DIAGNOSIS — Z87.898 HISTORY OF VERTIGO: ICD-10-CM

## 2024-04-18 PROCEDURE — 97112 NEUROMUSCULAR REEDUCATION: CPT | Mod: GP | Performed by: PHYSICAL THERAPIST

## 2024-04-18 NOTE — PROGRESS NOTES
"Physical Therapy Treatment    Patient Name: Pat Jaimes  MRN: [id number]  Encounter date:  4/18/2024  Time Calculation  Start Time: 1129  Stop Time: 1208  Time Calculation (min): 39 min        Visit Number:  7 (including evaluation)  Planned total visits: 10  Visit Authorized:  MEDICARE A&B, 240 DED, 80% COVERAGE, [place], [employer] ACTIVE SECONDARY    Current Problem  1. History of vertigo  Follow Up In Physical Therapy      2. BPPV (benign paroxysmal positional vertigo), left  Follow Up In Physical Therapy      3. Imbalance  Follow Up In Physical Therapy          Surgery  Remote craniotomy     Precautions      Fall risk due to vertigo, DM.  Meningioma on brain, Sz hx.     Pain/dizzy/nausea:   0/0/0 now     Location knees  Description ache  Subjective  General     Pt had passed out on the toilet and went to the hospitals and had MRI, carotid US, etc.  States everything was - test wise.  Pt PCP NP note states  \"She will continue receiving vestibular therapy and plans on seeing Neurology next week.   Estelle Benson, APRN-CNP.\"  Ok To resume PT per this statement.  Pt states neuro encouraged more fluids.    Objective  Prior:  MCTSIB 3/4  - B Roll tests  - [place]elberry-Hallpike   Mild + HIT to L  - H smooth pursuits  - B Laurence-Hallpike    Treatment:     Seated:  VOR H AND V, 2 X 30\" ea  Saccades V, H and D x 10 ea  Gaze stabilization with:  STS 2 x 10  VOR cancellation with convergence  Ankle circles 2 x 20 ea  Smooth pursuits x 10 all planes        DNP:  Stand on wedge to control retropulsion:  EO, EC 1' ea, V and H Ht's x 10 ea  Stand on foam:  EO 60\"  EC 60\"  V and H HT's x10 ea  Convergence x 15, and also with VOR  F and lat step ups x 10 ea on foam  Gait in bars V and H HT's, vor, backward, F, s/s  [place] turns 2 x 2 ea  Seated on plinth:  VOR H AND V, 2 X 30\" ea  Saccades V, H and D x 10 ea  Heel/toe rocks x 20  March x 20  Morrsi string 60\" DNP  Busy pattern-DNP  DVA-DNP  VOR cancellation to metronome at 50 bps     Activity " "tolerance:  good    OP EDUCATION:  Add:  Access Code: [email or web address]  URL: [email or web address]  Date: 04/18/2024  Prepared by: Heladio Harrison    Exercises  - Sit to Stand Without Arm Support  - 1 x daily - 7 x weekly - 2 sets - 10 reps  - Standing Hip Abduction with Counter Support  - 1 x daily - 7 x weekly - 2 sets - 10 reps  - Standing March with Counter Support  - 1 x daily - 7 x weekly - 2 sets - 10 reps  - Standing Knee Flexion with Counter Support  - 1 x daily - 7 x weekly - 2 sets - 10 reps  - Heel Toe Raises with Counter Support  - 1 x daily - 7 x weekly - 2 sets - 10 reps  Continue prior HEP:   Use rocking chair and rec bike (start 5') at home  diaphragmatic breathing  HEP:    Assessment:     Pt's response to treatment:  pt with good pace with new TE's after cues.  Pt needed many cues for proper gaze stabilization exercises for pace and form  Areas of improvements:  vertigo  Limitations/deficits:  sensory integration    Pain end of session:  NO INCR, no D/N    Plan:   Re-eval  Continue with current POC with the following changes balance and gaze stabilization focus  Continued PT required to reach all goals and restore function  Assessment of current progress against goals:  Progressing toward functional goals    Goals:      STG:   Pt to be I in initial HEP.     LTG\"s:  - BPPV testing  Improve score on outcome form by 10 points to show incr in function.  Decr max dizziness level to 2/10 for community activity.  MCTSIB 4/4 for incr sensory integration.         "

## 2024-04-25 ENCOUNTER — TREATMENT (OUTPATIENT)
Dept: PHYSICAL THERAPY | Facility: CLINIC | Age: 79
End: 2024-04-25
Payer: MEDICARE

## 2024-04-25 DIAGNOSIS — R26.89 IMBALANCE: ICD-10-CM

## 2024-04-25 DIAGNOSIS — H81.12 BPPV (BENIGN PAROXYSMAL POSITIONAL VERTIGO), LEFT: ICD-10-CM

## 2024-04-25 DIAGNOSIS — Z87.898 HISTORY OF VERTIGO: ICD-10-CM

## 2024-04-25 PROCEDURE — 97112 NEUROMUSCULAR REEDUCATION: CPT | Mod: GP | Performed by: PHYSICAL THERAPIST

## 2024-04-25 NOTE — PROGRESS NOTES
"Physical Therapy Treatment Progress Report and Discharge Summary    Patient Name: Pat Jaimes  MRN: [id number]  Encounter date:  4/25/2024  Time Calculation  Start Time: 1133  Stop Time: 1211  Time Calculation (min): 38 min     PT Therapeutic Procedures Time Entry  Neuromuscular Re-Education Time Entry: 38  Visit Number:  8 (including evaluation)  Planned total visits: 8  Visit Authorized:  MEDICARE A&B, 240 DED, 80% [place], [place], [employer] ACTIVE SECOND[employer]    Current Problem  1. History of vertigo  Follow Up In Physical Therapy      2. BPPV (benign paroxysmal positional vertigo), left  Follow Up In Physical Therapy      3. Imbalance  Follow Up In Physical Therapy          Surgery  Remote craniotomy     Precautions      Fall risk due to vertigo, DM.  Meningioma on brain, Sz hx.     Pain/dizzy/nausea:   0/0/0 now     Location knees  Description ache  Subjective  General     No new c/o.  More confident, no dizziness with ambulation.  Gets a little unsteady with bending over at times.    Objective  DHI = 14    Balance Test Scores     MCTSIB 2/4 (30,6,30,2) shoes on               Treatment:     Seated:  VOR H AND V, 2 X 30\" ea  Saccades V, H and D x 10 ea  Gaze stabilization with:  STS 2 x 10  VOR cancellation with convergence 30\"  Ankle circles 2 x 20 ea  Smooth pursuits x 10 all planes  Stand on foam:  EO 60\"  EC 60\"  V and H HT's x10 ea  Gait in bars backward, F, s/s        Activity tolerance:  good    OP EDUCATION:  Continue HEP:    Exercises  - Sit to Stand Without Arm Support  - 1 x daily - 7 x weekly - 2 sets - 10 reps  - Standing Hip Abduction with Counter Support  - 1 x daily - 7 x weekly - 2 sets - 10 reps  - Standing March with Counter Support  - 1 x daily - 7 x weekly - 2 sets - 10 reps  - Standing Knee Flexion with Counter Support  - 1 x daily - 7 x weekly - 2 sets - 10 reps  - Heel Toe Raises with Counter Support  - 1 x daily - 7 x weekly - 2 sets - 10 reps  Continue prior HEP:   Use rocking chair and rec " "bike (start 5') at home  diaphragmatic breathing  HEP:    Assessment:     Pt has progressed well toward goals, met most goals. PT recommendation is to discharge to HEP.  Pt agrees.      Pain/dizziness at end of session: 0    Plan:    Discharge PT    Goals:      STG:   Pt to be I in initial HEP-goal met.     LTG\"s:  - BPPV testing-goal met  Improve score on outcome form by 10 points to show incr in function.-goal met  Decr max dizziness level to 2/10 for community activity.-goal met  MCTSIB 4/4 for incr sensory integration.-goal PA         "

## 2024-04-30 ENCOUNTER — LAB (OUTPATIENT)
Dept: LAB | Facility: LAB | Age: 79
End: 2024-04-30
Payer: MEDICARE

## 2024-04-30 DIAGNOSIS — I10 PRIMARY HYPERTENSION: ICD-10-CM

## 2024-04-30 DIAGNOSIS — E10.69 TYPE 1 DIABETES MELLITUS WITH OTHER SPECIFIED COMPLICATION (MULTI): ICD-10-CM

## 2024-04-30 DIAGNOSIS — G40.909 SEIZURE DISORDER (MULTI): ICD-10-CM

## 2024-04-30 DIAGNOSIS — E06.3 HYPOTHYROIDISM DUE TO HASHIMOTO'S THYROIDITIS: ICD-10-CM

## 2024-04-30 DIAGNOSIS — E03.8 HYPOTHYROIDISM DUE TO HASHIMOTO'S THYROIDITIS: ICD-10-CM

## 2024-04-30 DIAGNOSIS — Z01.89 ENCOUNTER FOR ROUTINE LABORATORY TESTING: ICD-10-CM

## 2024-04-30 LAB
ALBUMIN SERPL-MCNC: 3.9 G/DL (ref 3.5–5)
ALP BLD-CCNC: 85 U/L (ref 35–125)
ALT SERPL-CCNC: 20 U/L (ref 5–40)
ANION GAP SERPL CALC-SCNC: 9 MMOL/L
AST SERPL-CCNC: 17 U/L (ref 5–40)
BASOPHILS # BLD AUTO: 0.06 X10*3/UL (ref 0–0.1)
BASOPHILS NFR BLD AUTO: 1.5 %
BILIRUB DIRECT SERPL-MCNC: <0.2 MG/DL (ref 0–0.2)
BILIRUB SERPL-MCNC: 0.7 MG/DL (ref 0.1–1.2)
BUN SERPL-MCNC: 23 MG/DL (ref 8–25)
CALCIUM SERPL-MCNC: 9 MG/DL (ref 8.5–10.4)
CHLORIDE SERPL-SCNC: 106 MMOL/L (ref 97–107)
CO2 SERPL-SCNC: 26 MMOL/L (ref 24–31)
CREAT SERPL-MCNC: 0.8 MG/DL (ref 0.4–1.6)
CREAT UR-MCNC: 139.5 MG/DL
EGFRCR SERPLBLD CKD-EPI 2021: 76 ML/MIN/1.73M*2
EOSINOPHIL # BLD AUTO: 0.1 X10*3/UL (ref 0–0.4)
EOSINOPHIL NFR BLD AUTO: 2.5 %
ERYTHROCYTE [DISTWIDTH] IN BLOOD BY AUTOMATED COUNT: 12.5 % (ref 11.5–14.5)
EST. AVERAGE GLUCOSE BLD GHB EST-MCNC: 169 MG/DL
GLUCOSE SERPL-MCNC: 220 MG/DL (ref 65–99)
HBA1C MFR BLD: 7.5 %
HCT VFR BLD AUTO: 41.8 % (ref 36–46)
HGB BLD-MCNC: 13.6 G/DL (ref 12–16)
IMM GRANULOCYTES # BLD AUTO: 0.01 X10*3/UL (ref 0–0.5)
IMM GRANULOCYTES NFR BLD AUTO: 0.2 % (ref 0–0.9)
LYMPHOCYTES # BLD AUTO: 1.36 X10*3/UL (ref 0.8–3)
LYMPHOCYTES NFR BLD AUTO: 33.7 %
MCH RBC QN AUTO: 30.6 PG (ref 26–34)
MCHC RBC AUTO-ENTMCNC: 32.5 G/DL (ref 32–36)
MCV RBC AUTO: 94 FL (ref 80–100)
MICROALBUMIN UR-MCNC: <12 MG/L (ref 0–23)
MICROALBUMIN/CREAT UR: NORMAL MG/G{CREAT}
MONOCYTES # BLD AUTO: 0.29 X10*3/UL (ref 0.05–0.8)
MONOCYTES NFR BLD AUTO: 7.2 %
NEUTROPHILS # BLD AUTO: 2.22 X10*3/UL (ref 1.6–5.5)
NEUTROPHILS NFR BLD AUTO: 54.9 %
NRBC BLD-RTO: 0 /100 WBCS (ref 0–0)
PLATELET # BLD AUTO: 229 X10*3/UL (ref 150–450)
POTASSIUM SERPL-SCNC: 4.4 MMOL/L (ref 3.4–5.1)
PROT SERPL-MCNC: 5.9 G/DL (ref 5.9–7.9)
RBC # BLD AUTO: 4.44 X10*6/UL (ref 4–5.2)
SODIUM SERPL-SCNC: 141 MMOL/L (ref 133–145)
TSH SERPL DL<=0.05 MIU/L-ACNC: 0.99 MIU/L (ref 0.27–4.2)
WBC # BLD AUTO: 4 X10*3/UL (ref 4.4–11.3)

## 2024-04-30 PROCEDURE — 36415 COLL VENOUS BLD VENIPUNCTURE: CPT

## 2024-04-30 PROCEDURE — 80053 COMPREHEN METABOLIC PANEL: CPT

## 2024-04-30 PROCEDURE — 82248 BILIRUBIN DIRECT: CPT

## 2024-04-30 PROCEDURE — 83036 HEMOGLOBIN GLYCOSYLATED A1C: CPT

## 2024-04-30 PROCEDURE — 85025 COMPLETE CBC W/AUTO DIFF WBC: CPT

## 2024-04-30 PROCEDURE — 84443 ASSAY THYROID STIM HORMONE: CPT

## 2024-04-30 PROCEDURE — 82570 ASSAY OF URINE CREATININE: CPT

## 2024-04-30 PROCEDURE — 82043 UR ALBUMIN QUANTITATIVE: CPT

## 2024-05-02 ENCOUNTER — TELEPHONE (OUTPATIENT)
Dept: PRIMARY CARE | Facility: CLINIC | Age: 79
End: 2024-05-02

## 2024-05-02 NOTE — TELEPHONE ENCOUNTER
"Rosalie from Dr. Hollis's office states that patient walked in their office this afternoon for an event monitor. She states that \"they don't just slap a heart monitors on people\". She states that someone at central scheduling scheduled this for patient and is demanding a new order be placed \"correctly\" and that someone contact the patient. Please advise.  "

## 2024-05-10 ENCOUNTER — OFFICE VISIT (OUTPATIENT)
Dept: PRIMARY CARE | Facility: CLINIC | Age: 79
End: 2024-05-10
Payer: MEDICARE

## 2024-05-10 VITALS
BODY MASS INDEX: 28.35 KG/M2 | SYSTOLIC BLOOD PRESSURE: 138 MMHG | OXYGEN SATURATION: 97 % | HEIGHT: 63 IN | DIASTOLIC BLOOD PRESSURE: 82 MMHG | HEART RATE: 61 BPM | TEMPERATURE: 95.5 F | WEIGHT: 160 LBS

## 2024-05-10 DIAGNOSIS — Z01.89 ENCOUNTER FOR ROUTINE LABORATORY TESTING: ICD-10-CM

## 2024-05-10 DIAGNOSIS — K21.9 GASTROESOPHAGEAL REFLUX DISEASE WITHOUT ESOPHAGITIS: ICD-10-CM

## 2024-05-10 DIAGNOSIS — E10.69 TYPE 1 DIABETES MELLITUS WITH OTHER SPECIFIED COMPLICATION (MULTI): Primary | ICD-10-CM

## 2024-05-10 DIAGNOSIS — E78.2 MIXED HYPERLIPIDEMIA: ICD-10-CM

## 2024-05-10 DIAGNOSIS — I10 PRIMARY HYPERTENSION: ICD-10-CM

## 2024-05-10 DIAGNOSIS — M81.0 AGE-RELATED OSTEOPOROSIS WITHOUT CURRENT PATHOLOGICAL FRACTURE: ICD-10-CM

## 2024-05-10 DIAGNOSIS — E06.3 HYPOTHYROIDISM DUE TO HASHIMOTO'S THYROIDITIS: ICD-10-CM

## 2024-05-10 DIAGNOSIS — E03.8 HYPOTHYROIDISM DUE TO HASHIMOTO'S THYROIDITIS: ICD-10-CM

## 2024-05-10 DIAGNOSIS — G40.909 SEIZURE DISORDER (MULTI): ICD-10-CM

## 2024-05-10 DIAGNOSIS — R35.89 POLYURIA: ICD-10-CM

## 2024-05-10 DIAGNOSIS — E55.9 VITAMIN D DEFICIENCY: ICD-10-CM

## 2024-05-10 LAB
POC APPEARANCE, URINE: CLEAR
POC BILIRUBIN, URINE: NEGATIVE
POC BLOOD, URINE: NEGATIVE
POC COLOR, URINE: YELLOW
POC GLUCOSE, URINE: NEGATIVE MG/DL
POC KETONES, URINE: NEGATIVE MG/DL
POC LEUKOCYTES, URINE: NEGATIVE
POC NITRITE,URINE: NEGATIVE
POC PH, URINE: 6 PH
POC PROTEIN, URINE: NEGATIVE MG/DL
POC SPECIFIC GRAVITY, URINE: 1.02
POC UROBILINOGEN, URINE: 0.2 EU/DL

## 2024-05-10 PROCEDURE — 3079F DIAST BP 80-89 MM HG: CPT | Performed by: STUDENT IN AN ORGANIZED HEALTH CARE EDUCATION/TRAINING PROGRAM

## 2024-05-10 PROCEDURE — 3075F SYST BP GE 130 - 139MM HG: CPT | Performed by: STUDENT IN AN ORGANIZED HEALTH CARE EDUCATION/TRAINING PROGRAM

## 2024-05-10 PROCEDURE — 1126F AMNT PAIN NOTED NONE PRSNT: CPT | Performed by: STUDENT IN AN ORGANIZED HEALTH CARE EDUCATION/TRAINING PROGRAM

## 2024-05-10 PROCEDURE — 81003 URINALYSIS AUTO W/O SCOPE: CPT | Mod: QW | Performed by: STUDENT IN AN ORGANIZED HEALTH CARE EDUCATION/TRAINING PROGRAM

## 2024-05-10 PROCEDURE — 99214 OFFICE O/P EST MOD 30 MIN: CPT | Performed by: STUDENT IN AN ORGANIZED HEALTH CARE EDUCATION/TRAINING PROGRAM

## 2024-05-10 PROCEDURE — 1160F RVW MEDS BY RX/DR IN RCRD: CPT | Performed by: STUDENT IN AN ORGANIZED HEALTH CARE EDUCATION/TRAINING PROGRAM

## 2024-05-10 PROCEDURE — G2211 COMPLEX E/M VISIT ADD ON: HCPCS | Performed by: STUDENT IN AN ORGANIZED HEALTH CARE EDUCATION/TRAINING PROGRAM

## 2024-05-10 PROCEDURE — 1036F TOBACCO NON-USER: CPT | Performed by: STUDENT IN AN ORGANIZED HEALTH CARE EDUCATION/TRAINING PROGRAM

## 2024-05-10 PROCEDURE — 1159F MED LIST DOCD IN RCRD: CPT | Performed by: STUDENT IN AN ORGANIZED HEALTH CARE EDUCATION/TRAINING PROGRAM

## 2024-05-10 ASSESSMENT — ENCOUNTER SYMPTOMS
CARDIOVASCULAR NEGATIVE: 1
CONSTITUTIONAL NEGATIVE: 1
RESPIRATORY NEGATIVE: 1
GASTROINTESTINAL NEGATIVE: 1

## 2024-05-10 ASSESSMENT — PAIN SCALES - GENERAL: PAINLEVEL: 0-NO PAIN

## 2024-05-10 ASSESSMENT — PATIENT HEALTH QUESTIONNAIRE - PHQ9
SUM OF ALL RESPONSES TO PHQ9 QUESTIONS 1 AND 2: 0
2. FEELING DOWN, DEPRESSED OR HOPELESS: NOT AT ALL
1. LITTLE INTEREST OR PLEASURE IN DOING THINGS: NOT AT ALL

## 2024-05-10 NOTE — PATIENT INSTRUCTIONS
- Significant medication and problem list reconciliation done today.  - Patient had labwork done for this appointment. Discussed today: Sugar mildly elevated; patient has upcoming appointment with endocrinology.  - Will order labwork for the patient's next appointment.  - Vitals look great. Do not need to make changes at this time.  - Encouraged continued diet and exercise modification.  - Patient has not had an episode of syncope since her admission. Patient notes that she has been dealing with increased urination since increasing her water intake. Was concerned that she had a UTI, UA done and normal in the office. Encouraged her to keep drinking water.  - Do not need to make other changes at this time.

## 2024-05-10 NOTE — PROGRESS NOTES
[facility]: [facility]  PROGRESS NOTE      Pat Jaimes is a 78 y.o. female that is presenting today for Follow-up (Concern of a UTI).    Assessment/Plan   Diagnoses and all orders for this visit:  Type 1 diabetes mellitus with other specified complication (Multi)  -     Follow Up In Primary Care  -     Hemoglobin A1C; Future  Age-related osteoporosis without current pathological fracture  -     Basic Metabolic Panel; Future  -     Vitamin D 25-Hydroxy,Total (for eval of Vitamin D levels); Future  Gastroesophageal reflux disease without esophagitis  Hypothyroidism due to Hashimoto's thyroiditis  -     TSH with reflex to Free T4 if abnormal; Future  Seizure disorder (Multi)  -     Hepatic Function Panel; Future  Mixed hyperlipidemia  -     Lipid Panel; Future  Primary hypertension  -     CBC and Auto Differential; Future  -     Basic Metabolic Panel; Future  Vitamin D deficiency  -     Vitamin D 25-Hydroxy,Total (for eval of Vitamin D levels); Future  Polyuria  -     POCT UA Automated manually resulted  Encounter for routine laboratory testing  -     CBC and Auto Differential; Future  -     Basic Metabolic Panel; Future  -     Hepatic Function Panel; Future  -     Lipid Panel; Future  -     Hemoglobin A1C; Future  -     Vitamin D 25-Hydroxy,Total (for eval of Vitamin D levels); Future  -     TSH with reflex to Free T4 if abnormal; Future  -     Follow Up In Primary Care; Future  -     Follow Up In Primary Care; Future    Current Outpatient Medications   Medication Instructions    amLODIPine (NORVASC) 5 mg, oral, Daily    blood sugar diagnostic (Accu-Chek Guide test strips) strip USE TO TEST 10 TIMES DAILY    insulin aspart, with niacinamide, (Fiasp U-100 Insulin) 100 unit/mL injection UP  UNITS DAILY VIA INSULIN PUMP    levETIRAcetam (KEPPRA) 750 mg, oral, 2 times daily    LevoxyL 137 mcg, oral, Daily before breakfast    losartan (COZAAR) 100 mg, oral, Daily    oxybutynin XL  (DITROPAN-XL) 10 mg, oral, Daily    pantoprazole (PROTONIX) 40 mg, oral, Daily before breakfast    patient supplied pump INSULIN PUMP- PATIENT SUPPLIED 0-100 Units, pump - subcutaneous    simvastatin (ZOCOR) 20 mg, oral, Daily     - Significant medication and problem list reconciliation done today.  - Patient had labwork done for this appointment. Discussed today: Sugar mildly elevated; patient has upcoming appointment with endocrinology.  - Will order labwork for the patient's next appointment.  - Vitals look great. Do not need to make changes at this time.  - Encouraged continued diet and exercise modification.  - Patient has not had an episode of syncope since her admission. Patient notes that she has been dealing with increased urination since increasing her water intake. Was concerned that she had a UTI, UA done and normal in the office. Encouraged her to keep drinking water.  - Do not need to make other changes at this time.    Subjective   - The patient otherwise feels well and denies any acute symptoms or concerns at this time.  - The patient denies any changes or progression of their chronic medical problems.  - The patient denies any problems or concerns with their medications.      Review of Systems   Constitutional: Negative.    Respiratory: Negative.     Cardiovascular: Negative.    Gastrointestinal: Negative.    All other systems reviewed and are negative.     Objective   Vitals:    05/10/24 1348   BP: 138/82   Pulse: 61   Temp: 35.3 °C (95.5 °F)   SpO2: 97%      Body mass index is 28.34 kg/m².  Physical Exam  Vitals and nursing note reviewed.   Constitutional:       General: She is not in acute distress.  Neck:      Vascular: No carotid bruit.   Cardiovascular:      Rate and Rhythm: Normal rate and regular rhythm.      Heart sounds: Normal heart sounds.   Pulmonary:      Effort: Pulmonary effort is normal.      Breath sounds: Normal breath sounds.   Musculoskeletal:         General: No swelling.  "  Neurological:      Mental Status: She is alert. Mental status is at baseline.   Psychiatric:         Mood and Affect: Mood normal.       Diagnostic Results   Lab Results   Component Value Date    GLUCOSE 220 (H) 04/30/2024    CALCIUM 9.0 04/30/2024     04/30/2024    K 4.4 04/30/2024    CO2 26 04/30/2024     04/30/2024    BUN 23 04/30/2024    CREATININE 0.80 04/30/2024     Lab Results   Component Value Date    ALT 20 04/30/2024    AST 17 04/30/2024    ALKPHOS 85 04/30/2024    BILITOT 0.7 04/30/2024     Lab Results   Component Value Date    WBC 4.0 (L) 04/30/2024    HGB 13.6 04/30/2024    HCT 41.8 04/30/2024    MCV 94 04/30/2024     04/30/2024     Lab Results   Component Value Date    CHOL 164 01/10/2024    CHOL 140 04/21/2023    CHOL 168 06/14/2022     Lab Results   Component Value Date    HDL 81.0 01/10/2024    HDL 72 04/21/2023    HDL 82 06/14/2022     Lab Results   Component Value Date    LDLCALC 70 01/10/2024    LDLCALC 52 (L) 04/21/2023    LDLCALC 75 06/14/2022     Lab Results   Component Value Date    TRIG 65 01/10/2024    TRIG 79 04/21/2023    TRIG 54 06/14/2022     No components found for: \"CHOLHDL\"  Lab Results   Component Value Date    HGBA1C 7.5 (H) 04/30/2024     Other labs not included in the list above were reviewed either before or during this encounter.    History    Past Medical History:   Diagnosis Date    Diabetes mellitus type I (Multi)     Hyperlipidemia     Hypertension     Hypothyroidism     Insulin pump in place     Other specified diabetes mellitus without complications (Multi)     Diabetes mellitus of other type without complication    Personal history of other diseases of the circulatory system     History of hypertension    Seizure disorder (Multi)      Past Surgical History:   Procedure Laterality Date    BI STEREOTACTIC GUIDED BREAST RIGHT LOCALIZATION AND BIOPSY Right 02/02/2018    BI STEREOTACTIC GUIDED BREAST LOCALIZATION AND BIOPSY RIGHT [facility]    " KNEE SURGERY Right 1989    ARTHROSCOPY    OTHER SURGICAL HISTORY  05/25/2021    Craniotomy    TUBAL LIGATION Bilateral 1982     Family History   Problem Relation Name Age of Onset    Diabetes Mother      Heart disease Mother      Heart disease Father       Social History     Socioeconomic History    Marital status:      Spouse name: Not on file    Number of children: Not on file    Years of education: Not on file    Highest education level: Not on file   Occupational History    Not on file   Tobacco Use    Smoking status: Never     Passive exposure: Never    Smokeless tobacco: Never   Vaping Use    Vaping status: Never Used   Substance and Sexual Activity    Alcohol use: Not Currently    Drug use: Never    Sexual activity: Not on file   Other Topics Concern    Not on file   Social History Narrative    Not on file     Social Determinants of Health     Financial Resource Strain: Low Risk  (3/20/2024)    Overall Financial Resource Strain (CARDIA)     Difficulty of Paying Living Expenses: Not hard at all   Food Insecurity: No Food Insecurity (3/20/2024)    Hunger Vital Sign     Worried About Running Out of Food in the Last Year: Never true     Ran Out of Food in the Last Year: Never true   Transportation Needs: No Transportation Needs (3/20/2024)    PRAPARE - Transportation     Lack of Transportation (Medical): No     Lack of Transportation (Non-Medical): No   Physical Activity: Inactive (3/20/2024)    Exercise Vital Sign     Days of Exercise per Week: 0 days     Minutes of Exercise per Session: 0 min   Stress: No Stress Concern Present (3/20/2024)    Citizen of [place] [employer] - Occupational Stress Questionnaire     Feeling of Stress : Not at all   Social Connections: Socially Isolated (3/20/2024)    Social Connection and Isolation Panel [NHANES]     Frequency of Communication with Friends and Family: More than three times a week     Frequency of Social Gatherings with Friends and Family: Never      Attends [employer] Services: Never     Active Member of Clubs or Organizations: No     Attends Club or Organization Meetings: Never     Marital Status:    Intimate Partner Violence: Not At Risk (3/20/2024)    Humiliation, Afraid, Rape, and Kick questionnaire     Fear of Current or Ex-Partner: No     Emotionally Abused: No     Physically Abused: No     Sexually Abused: No   Housing Stability: Low Risk  (3/20/2024)    Housing Stability Vital Sign     Unable to Pay for Housing in the Last Year: No     Number of Places Lived in the Last Year: 1     Unstable Housing in the Last Year: No     Allergies   Allergen Reactions    Sulfa (Sulfonamide Antibiotics) Hives    Sulfamethoxazole-Trimethoprim Hives    Erythromycin Other    Amoxicillin-Pot Clavulanate Diarrhea     Current Outpatient Medications on File Prior to Visit   Medication Sig Dispense Refill    amLODIPine (Norvasc) 5 mg tablet Take 1 tablet (5 mg) by mouth once daily. 90 tablet 3    blood sugar diagnostic (Accu-Chek Guide test strips) strip USE TO TEST 10 TIMES DAILY 900 strip 3    insulin aspart, with niacinamide, (Fiasp U-100 Insulin) 100 unit/mL injection UP  UNITS DAILY VIA INSULIN PUMP 90 mL 3    levETIRAcetam (Keppra) 750 mg tablet Take 1 tablet (750 mg) by mouth 2 times a day.      LevoxyL 137 mcg tablet Take 1 tablet (137 mcg) by mouth once daily in the morning. Take before meals.      losartan (Cozaar) 100 mg tablet Take 1 tablet (100 mg) by mouth once daily.      oxybutynin XL (Ditropan-XL) 10 mg 24 hr tablet Take 1 tablet (10 mg) by mouth once daily.      pantoprazole (ProtoNix) 40 mg EC tablet Take 1 tablet (40 mg) by mouth once daily in the morning. Take before meals.      patient supplied pump INSULIN PUMP- PATIENT SUPPLIED Inject 0-1 each (0-100 Units) under the skin.      simvastatin (Zocor) 20 mg tablet Take 1 tablet (20 mg) by mouth once daily.       No current facility-administered medications on file prior to visit.      Immunization History   Administered Date(s) Administered    Flu vaccine, quadrivalent, high-dose, preservative free, age 65y+ (FLUZONE) 09/11/2020, 09/28/2020, 10/04/2021, 09/19/2022, 09/29/2023    Influenza, High Dose Seasonal, Preservative Free 10/02/2018    Influenza, injectable, quadrivalent 10/30/2017, 09/26/2019, 09/30/2022    Influenza, seasonal, injectable 10/01/2012, 10/01/2014, 10/15/2014, 09/24/2015    Influenza, seasonal, injectable, preservative free 10/06/2008    Novel influenza-H1N1-09, preservative-free 11/15/2009    Pfizer COVID-19 vaccine, Fall 2023, 12 years and older, (30mcg/0.3mL) 12/12/2023    Pfizer COVID-19 vaccine, bivalent, age 12 years and older (30 mcg/0.3 mL) 12/19/2022    Pfizer Purple Cap SARS-CoV-2 03/04/2021, 04/01/2021, 11/08/2021    Pneumococcal conjugate vaccine, 13-valent (PREVNAR 13) 11/22/2012    RESPIRATORY SYNCYTIAL VIRUS (RSV), ELIGIBLE PREGNANT PTS, 0.5 ML (ABRYSVO) 09/29/2023    Tdap vaccine, age 7 year and older (BOOSTRIX, ADACEL) 06/11/2016    Zoster, live 12/11/2009     Patient's medical history was reviewed and updated either before or during this encounter.       En Yee MD

## 2024-05-21 DIAGNOSIS — E78.2 MIXED HYPERLIPIDEMIA: ICD-10-CM

## 2024-05-21 RX ORDER — SIMVASTATIN 20 MG/1
20 TABLET, FILM COATED ORAL EVERY EVENING
Qty: 90 TABLET | Refills: 3 | Status: SHIPPED | OUTPATIENT
Start: 2024-05-21

## 2024-06-06 ENCOUNTER — CLINICAL SUPPORT (OUTPATIENT)
Dept: ENDOCRINOLOGY | Facility: CLINIC | Age: 79
End: 2024-06-06
Payer: MEDICARE

## 2024-06-06 VITALS
BODY MASS INDEX: 28.95 KG/M2 | HEART RATE: 56 BPM | DIASTOLIC BLOOD PRESSURE: 64 MMHG | WEIGHT: 163.4 LBS | SYSTOLIC BLOOD PRESSURE: 124 MMHG

## 2024-06-06 DIAGNOSIS — I10 ESSENTIAL HYPERTENSION: ICD-10-CM

## 2024-06-06 DIAGNOSIS — E10.65 TYPE 1 DIABETES MELLITUS WITH HYPERGLYCEMIA (MULTI): Primary | ICD-10-CM

## 2024-06-06 DIAGNOSIS — E78.2 MIXED HYPERLIPIDEMIA: ICD-10-CM

## 2024-06-06 LAB — POC HEMOGLOBIN A1C: 6.8 % (ref 4.2–6.5)

## 2024-06-06 PROCEDURE — 95251 CONT GLUC MNTR ANALYSIS I&R: CPT | Performed by: INTERNAL MEDICINE

## 2024-06-06 PROCEDURE — 83036 HEMOGLOBIN GLYCOSYLATED A1C: CPT | Performed by: INTERNAL MEDICINE

## 2024-06-06 PROCEDURE — 99214 OFFICE O/P EST MOD 30 MIN: CPT | Performed by: INTERNAL MEDICINE

## 2024-06-06 NOTE — PROGRESS NOTES
HPI     Presents for follow up/metabolic management 79 yo with DM Type 1 diagnosed at age 27. History HTN, HLD, Hypothyroidism, GERD and Seizures.    A1c- 7.2% last visit,  6.8% today.  Pt is testing >4 times/day using guardian cgm.  Following carb controlled diet and knows reasonable carb allowances. Patient able to afford their medications. Patient is exercising/very active.    -CGM   Guardian smart guard with Mini-Medtronic 780G     -HTN   Losartan, Amlodipine daily     -STATIN   Simvastatin 20mg (Jan 2024 labs ldl-70)    -PUMP:  medtronic 780G with guardian cgm in automode   Basal  0000  0.50  0300  0.70  0600  0.60  2000  0.95  ICR    0000  1:20   0700  1:15  ISF    0000  50  TARGET   0000 120-120    -levemir flexpen U100 sample provided for pump failure, exp 02/28/2026, [id number]      14 days carelink data reviewed & attached:   80% in target,  0% lows,  patterns, roller coaster like pattern most days,  variable readings with many peaks/valleys often still remaining within target range despite    - fear of lows,  treats if/when sugar drops <110.  Often treating with OJ +/- additional carb source (cookie, etc)        Current Outpatient Medications:     amLODIPine (Norvasc) 5 mg tablet, Take 1 tablet (5 mg) by mouth once daily., Disp: 90 tablet, Rfl: 3    blood sugar diagnostic (Accu-Chek Guide test strips) strip, USE TO TEST 10 TIMES DAILY, Disp: 900 strip, Rfl: 3    insulin aspart, with niacinamide, (Fiasp U-100 Insulin) 100 unit/mL injection, UP  UNITS DAILY VIA INSULIN PUMP, Disp: 90 mL, Rfl: 3    levETIRAcetam (Keppra) 750 mg tablet, Take 1 tablet (750 mg) by mouth 2 times a day., Disp: , Rfl:     LevoxyL 137 mcg tablet, Take 1 tablet (137 mcg) by mouth once daily in the morning. Take before meals., Disp: , Rfl:     losartan (Cozaar) 100 mg tablet, Take 1 tablet (100 mg) by mouth once daily., Disp: , Rfl:     oxybutynin XL (Ditropan-XL) 10 mg 24 hr tablet, Take 1 tablet (10 mg) by mouth once daily.,  Disp: , Rfl:     pantoprazole (ProtoNix) 40 mg EC tablet, Take 1 tablet (40 mg) by mouth once daily in the morning. Take before meals., Disp: , Rfl:     patient supplied pump INSULIN PUMP- PATIENT SUPPLIED, Inject 0-1 each (0-100 Units) under the skin., Disp: , Rfl:     simvastatin (Zocor) 20 mg tablet, TAKE 1 TABLET EVERY EVENING, Disp: 90 tablet, Rfl: 3    Allergies as of 06/06/2024 - Reviewed 05/10/2024   Allergen Reaction Noted    Sulfa (sulfonamide antibiotics) Hives 09/02/2023    Sulfamethoxazole-trimethoprim Hives 09/02/2023    Erythromycin Other 09/02/2023    Amoxicillin-pot clavulanate Diarrhea 09/02/2023       /64   Pulse 56   Wt 74.1 kg (163 lb 6.4 oz)   BMI 28.95 kg/m²     Labs:   Lab Results   Component Value Date    WBC 4.0 (L) 04/30/2024    NRBC 0.0 04/30/2024    RBC 4.44 04/30/2024    HGB 13.6 04/30/2024    HCT 41.8 04/30/2024     04/30/2024     Lab Results   Component Value Date    CALCIUM 9.0 04/30/2024    AST 17 04/30/2024    ALKPHOS 85 04/30/2024    BILITOT 0.7 04/30/2024    PROT 5.9 04/30/2024    ALBUMIN 3.9 04/30/2024    GLOB 2.2 04/21/2023    AGR 1.8 04/21/2023     04/30/2024    K 4.4 04/30/2024     04/30/2024    CO2 26 04/30/2024    ANIONGAP 9 04/30/2024    BUN 23 04/30/2024    CREATININE 0.80 04/30/2024    UREACREAUR 20.0 04/21/2023    GLUCOSE 220 (H) 04/30/2024    ALT 20 04/30/2024    EGFR 76 04/30/2024     Lab Results   Component Value Date    CHOL 164 01/10/2024    TRIG 65 01/10/2024    HDL 81.0 01/10/2024    LDLCALC 70 01/10/2024     Lab Results   Component Value Date    MICROALBCREA  04/30/2024      Comment:      One or more analytes used in this calculation is outside of the analytical measurement range. Calculation cannot be performed.     Lab Results   Component Value Date    TSH 0.99 04/30/2024     Lab Results   Component Value Date    [id number] 404 01/09/2023     Lab Results   Component Value Date    HGBA1C 6.8 (A) 06/06/2024         Assessment/Plan    1. Type 1 diabetes mellitus with hyperglycemia (Multi)    -A1c ordered & reviewed   -labs reviewed   -carelink/guardian data reviewed & attached     Still getting used to using guardian sensor / automode,  longtime medtronic pump user though up until recently had not used any cgm     Adjust SmartGuard target from 100 to now 120 given pts fear of low blood sugars     Spent a lot of time discussing AID algorithm,  best practices / recommendations for success etc.     Stressed importance avoiding overtreating low blood sugars     Activity mode is an option        2. Mixed hyperlipidemia  On statin,  tolerating   Reviewed labs     3. Essential hypertension  At target on therapy after resting       Follow up: 2mon gavin, 4mon deep    -labs/tests/notes reviewed  -reviewed and counseled patient on medication monitoring and side effects    Treatment and plan discussed with Dr. Pérez.  JILL Gibson, PharmD, [facility], [facility].     Medical Decision Making  Complexity of problem: Chronic illness of diabetes mellitus uncontrolled, progressing  Data analyzed and reviewed: Reviewed prior notes, blood glucose data, labs including HgbA1c, lipids, serum chemistries.  Ordered tests.   Risk of complications and morbidities: Is definite because of use of insulin and risk of hypoglycemia.  Prescription medications reviewed and modifications made.  Compliance assessed.  Addressed social determinants of health including food insecurity.

## 2024-06-06 NOTE — PATIENT INSTRUCTIONS
Changes made to pump settings:  - Smart Guard target changed from 100 to now 120     Try to treat low blood sugar (below 70) with only 5-10 grams carb   Avoid overtreating low blood sugars     Can consider using activity mode if concern for low blood sugars persist despite doing above

## 2024-06-06 NOTE — PROGRESS NOTES
I, Dr Kel Pérez, have reviewed this progress note, medication list, vital signs, any pertinent lab values, and any CGM data if present with the [facility] and  face to face during this visit today. This note reflects the treatment plan that was made under my direction after reviewing the above mentioned elements while face to face with the patient and CDE.  I personally answered and addressed any questions and concerns the patient had during the visit today.  The CDE entered the data in this note under my direction and I personally reviewed it, signed any lab or medication orders that I instructed to be completed. I am the billing provider for this visit and the level of service was determined by my involvement in the Medical Decision Making Component of this visit while face to face with the patient.    Electronically signed by Kel Pérez MD on 6/6/24 at 3:38 PM.

## 2024-06-07 ENCOUNTER — APPOINTMENT (OUTPATIENT)
Dept: ENDOCRINOLOGY | Facility: CLINIC | Age: 79
End: 2024-06-07
Payer: MEDICARE

## 2024-06-10 ENCOUNTER — HOSPITAL ENCOUNTER (OUTPATIENT)
Dept: CARDIOLOGY | Facility: HOSPITAL | Age: 79
Discharge: HOME | End: 2024-06-10
Payer: MEDICARE

## 2024-06-10 DIAGNOSIS — R55 SYNCOPE, UNSPECIFIED SYNCOPE TYPE: ICD-10-CM

## 2024-06-10 PROCEDURE — 93242 EXT ECG>48HR<7D RECORDING: CPT

## 2024-07-06 DIAGNOSIS — E03.8 HYPOTHYROIDISM DUE TO HASHIMOTO'S THYROIDITIS: ICD-10-CM

## 2024-07-06 DIAGNOSIS — E06.3 HYPOTHYROIDISM DUE TO HASHIMOTO'S THYROIDITIS: ICD-10-CM

## 2024-07-08 RX ORDER — LEVOTHYROXINE SODIUM 137 UG/1
TABLET ORAL
Qty: 90 TABLET | Refills: 3 | Status: SHIPPED | OUTPATIENT
Start: 2024-07-08

## 2024-08-09 ENCOUNTER — OFFICE VISIT (OUTPATIENT)
Dept: PRIMARY CARE | Facility: CLINIC | Age: 79
End: 2024-08-09
Payer: MEDICARE

## 2024-08-09 VITALS
SYSTOLIC BLOOD PRESSURE: 140 MMHG | DIASTOLIC BLOOD PRESSURE: 52 MMHG | HEIGHT: 63 IN | WEIGHT: 164 LBS | HEART RATE: 55 BPM | TEMPERATURE: 97.2 F | BODY MASS INDEX: 29.06 KG/M2 | OXYGEN SATURATION: 99 %

## 2024-08-09 DIAGNOSIS — G40.909 SEIZURE DISORDER (MULTI): ICD-10-CM

## 2024-08-09 DIAGNOSIS — E06.3 HYPOTHYROIDISM DUE TO HASHIMOTO'S THYROIDITIS: ICD-10-CM

## 2024-08-09 DIAGNOSIS — E10.69 TYPE 1 DIABETES MELLITUS WITH OTHER SPECIFIED COMPLICATION (MULTI): ICD-10-CM

## 2024-08-09 DIAGNOSIS — E55.9 VITAMIN D DEFICIENCY: ICD-10-CM

## 2024-08-09 DIAGNOSIS — E03.8 HYPOTHYROIDISM DUE TO HASHIMOTO'S THYROIDITIS: ICD-10-CM

## 2024-08-09 DIAGNOSIS — R32 URINARY INCONTINENCE, UNSPECIFIED TYPE: ICD-10-CM

## 2024-08-09 DIAGNOSIS — K21.9 GASTROESOPHAGEAL REFLUX DISEASE WITHOUT ESOPHAGITIS: ICD-10-CM

## 2024-08-09 DIAGNOSIS — M81.0 AGE-RELATED OSTEOPOROSIS WITHOUT CURRENT PATHOLOGICAL FRACTURE: ICD-10-CM

## 2024-08-09 DIAGNOSIS — G40.909 NONINTRACTABLE EPILEPSY WITHOUT STATUS EPILEPTICUS, UNSPECIFIED EPILEPSY TYPE (MULTI): Primary | ICD-10-CM

## 2024-08-09 DIAGNOSIS — I10 PRIMARY HYPERTENSION: ICD-10-CM

## 2024-08-09 DIAGNOSIS — Z01.89 ENCOUNTER FOR ROUTINE LABORATORY TESTING: ICD-10-CM

## 2024-08-09 DIAGNOSIS — E78.2 MIXED HYPERLIPIDEMIA: ICD-10-CM

## 2024-08-09 PROCEDURE — 99214 OFFICE O/P EST MOD 30 MIN: CPT | Performed by: STUDENT IN AN ORGANIZED HEALTH CARE EDUCATION/TRAINING PROGRAM

## 2024-08-09 PROCEDURE — G2211 COMPLEX E/M VISIT ADD ON: HCPCS | Performed by: STUDENT IN AN ORGANIZED HEALTH CARE EDUCATION/TRAINING PROGRAM

## 2024-08-09 PROCEDURE — 1160F RVW MEDS BY RX/DR IN RCRD: CPT | Performed by: STUDENT IN AN ORGANIZED HEALTH CARE EDUCATION/TRAINING PROGRAM

## 2024-08-09 PROCEDURE — 3077F SYST BP >= 140 MM HG: CPT | Performed by: STUDENT IN AN ORGANIZED HEALTH CARE EDUCATION/TRAINING PROGRAM

## 2024-08-09 PROCEDURE — 1159F MED LIST DOCD IN RCRD: CPT | Performed by: STUDENT IN AN ORGANIZED HEALTH CARE EDUCATION/TRAINING PROGRAM

## 2024-08-09 PROCEDURE — 1126F AMNT PAIN NOTED NONE PRSNT: CPT | Performed by: STUDENT IN AN ORGANIZED HEALTH CARE EDUCATION/TRAINING PROGRAM

## 2024-08-09 PROCEDURE — 3078F DIAST BP <80 MM HG: CPT | Performed by: STUDENT IN AN ORGANIZED HEALTH CARE EDUCATION/TRAINING PROGRAM

## 2024-08-09 RX ORDER — LEVOTHYROXINE SODIUM 137 UG/1
TABLET ORAL
Qty: 90 TABLET | Refills: 3 | Status: SHIPPED | OUTPATIENT
Start: 2024-08-09 | End: 2024-08-09 | Stop reason: SDUPTHER

## 2024-08-09 RX ORDER — AMLODIPINE BESYLATE 5 MG/1
5 TABLET ORAL DAILY
Status: SHIPPED
Start: 2024-08-09

## 2024-08-09 RX ORDER — SIMVASTATIN 20 MG/1
20 TABLET, FILM COATED ORAL EVERY EVENING
Status: SHIPPED
Start: 2024-08-09

## 2024-08-09 RX ORDER — LOSARTAN POTASSIUM 100 MG/1
100 TABLET ORAL DAILY
Status: SHIPPED
Start: 2024-08-09

## 2024-08-09 RX ORDER — MULTIVITAMIN
1 TABLET ORAL DAILY
COMMUNITY
Start: 2024-08-09

## 2024-08-09 RX ORDER — LEVETIRACETAM 750 MG/1
750 TABLET ORAL 2 TIMES DAILY
Status: SHIPPED
Start: 2024-08-09

## 2024-08-09 RX ORDER — LEVOTHYROXINE SODIUM 137 UG/1
TABLET ORAL
Qty: 90 TABLET | Refills: 3 | Status: SHIPPED | OUTPATIENT
Start: 2024-08-09

## 2024-08-09 RX ORDER — PANTOPRAZOLE SODIUM 40 MG/1
40 TABLET, DELAYED RELEASE ORAL
Status: SHIPPED
Start: 2024-08-09

## 2024-08-09 RX ORDER — OXYBUTYNIN CHLORIDE 10 MG/1
10 TABLET, EXTENDED RELEASE ORAL DAILY
Status: SHIPPED
Start: 2024-08-09

## 2024-08-09 ASSESSMENT — PAIN SCALES - GENERAL: PAINLEVEL: 0-NO PAIN

## 2024-08-09 NOTE — PROGRESS NOTES
[facility]: [facility]  PROGRESS NOTE      Pat Jaimes is a 78 y.o. female that is presenting today for Follow-up (3 mo/).    Assessment/Plan   - Significant medication and problem list reconciliation done today.  - Patient did not require labwork for this appointment.  - Patient has already had labs ordered for their next appointment. Encouraged the patient to get this done one week prior to the next appointment.  - Blood pressure at goal today.  - Encouraged continued diet, exercise, and lifestyle modification.  - Since her last appointment, the patient has been doing well. Has not had further syncopal events. Has completed her cardiac event monitor and is waiting to see what the final report says.   - Patient noting that she has been getting generic levothyroxine instead of brand-name levoxyl (which she has previously been getting through her endocrinologist). Discussed with her that although there is literature that conflicts with this, I am happy to just ensure that the brand name stuff is written moving forward.    Current Outpatient Medications   Medication Instructions    amLODIPine (NORVASC) 5 mg, oral, Daily    blood sugar diagnostic (Accu-Chek Guide test strips) strip USE TO TEST 10 TIMES DAILY    insulin aspart, with niacinamide, (Fiasp U-100 Insulin) 100 unit/mL injection UP  UNITS DAILY VIA INSULIN PUMP    levETIRAcetam (KEPPRA) 750 mg, oral, 2 times daily    LevoxyL 137 mcg tablet One tablet once/day for six days/week. Hold on Sundays.    losartan (COZAAR) 100 mg, oral, Daily    multivitamin tablet 1 tablet, oral, Daily    oxybutynin XL (DITROPAN-XL) 10 mg, oral, Daily    pantoprazole (PROTONIX) 40 mg, oral, Daily before breakfast    patient supplied pump INSULIN PUMP- PATIENT SUPPLIED 0-100 Units, pump - subcutaneous    simvastatin (ZOCOR) 20 mg, oral, Every evening     Diagnoses and all orders for this visit:  Nonintractable epilepsy without status epilepticus,  unspecified epilepsy type (Multi)  -     levETIRAcetam (Keppra) 750 mg tablet; Take 1 tablet (750 mg) by mouth 2 times a day.  Type 1 diabetes mellitus with other specified complication (Multi)  Age-related osteoporosis without current pathological fracture  -     multivitamin tablet; Take 1 tablet by mouth once daily.  Gastroesophageal reflux disease without esophagitis  -     pantoprazole (ProtoNix) 40 mg EC tablet; Take 1 tablet (40 mg) by mouth once daily in the morning. Take before meals.  Hypothyroidism due to Hashimoto's thyroiditis  -     LevoxyL 137 mcg tablet; One tablet once/day for six days/week. Hold on Sundays.  Urinary incontinence, unspecified type  -     oxybutynin XL (Ditropan-XL) 10 mg 24 hr tablet; Take 1 tablet (10 mg) by mouth once daily.  Seizure disorder (Multi)  -     levETIRAcetam (Keppra) 750 mg tablet; Take 1 tablet (750 mg) by mouth 2 times a day.  Mixed hyperlipidemia  -     simvastatin (Zocor) 20 mg tablet; Take 1 tablet (20 mg) by mouth once daily in the evening.  Primary hypertension  -     amLODIPine (Norvasc) 5 mg tablet; Take 1 tablet (5 mg) by mouth once daily.  -     losartan (Cozaar) 100 mg tablet; Take 1 tablet (100 mg) by mouth once daily.  Vitamin D deficiency  -     multivitamin tablet; Take 1 tablet by mouth once daily.  Encounter for routine laboratory testing  -     Follow Up In Primary Care    Subjective   - The patient otherwise feels well and denies any acute symptoms or concerns at this time.  - The patient denies any changes or progression of their chronic medical problems.  - The patient denies any problems or concerns with their medications.      Review of Systems   Constitutional: Negative.    Respiratory: Negative.     Cardiovascular: Negative.    Gastrointestinal: Negative.    All other systems reviewed and are negative.     Objective   Vitals:    08/09/24 0835   BP: 140/52   Pulse: 55   Temp: 36.2 °C (97.2 °F)   SpO2: 99%      Body mass index is 29.05  "kg/m².  Physical Exam  Vitals and nursing note reviewed.   Constitutional:       General: She is not in acute distress.  Neck:      Vascular: No carotid bruit.   Cardiovascular:      Rate and Rhythm: Normal rate and regular rhythm.      Heart sounds: Normal heart sounds.   Pulmonary:      Effort: Pulmonary effort is normal.      Breath sounds: Normal breath sounds.   Musculoskeletal:         General: No swelling.   Neurological:      Mental Status: She is alert. Mental status is at baseline.   Psychiatric:         Mood and Affect: Mood normal.       Diagnostic Results   Lab Results   Component Value Date    GLUCOSE 220 (H) 04/30/2024    CALCIUM 9.0 04/30/2024     04/30/2024    K 4.4 04/30/2024    CO2 26 04/30/2024     04/30/2024    BUN 23 04/30/2024    CREATININE 0.80 04/30/2024     Lab Results   Component Value Date    ALT 20 04/30/2024    AST 17 04/30/2024    ALKPHOS 85 04/30/2024    BILITOT 0.7 04/30/2024     Lab Results   Component Value Date    WBC 4.0 (L) 04/30/2024    HGB 13.6 04/30/2024    HCT 41.8 04/30/2024    MCV 94 04/30/2024     04/30/2024     Lab Results   Component Value Date    CHOL 164 01/10/2024    CHOL 140 04/21/2023    CHOL 168 06/14/2022     Lab Results   Component Value Date    HDL 81.0 01/10/2024    HDL 72 04/21/2023    HDL 82 06/14/2022     Lab Results   Component Value Date    LDLCALC 70 01/10/2024    LDLCALC 52 (L) 04/21/2023    LDLCALC 75 06/14/2022     Lab Results   Component Value Date    TRIG 65 01/10/2024    TRIG 79 04/21/2023    TRIG 54 06/14/2022     No components found for: \"CHOLHDL\"  Lab Results   Component Value Date    HGBA1C 6.8 (A) 06/06/2024     Other labs not included in the list above were reviewed either before or during this encounter.    History    Past Medical History:   Diagnosis Date    Diabetes mellitus type I (Multi)     Hyperlipidemia     Hypertension     Hypothyroidism     Insulin pump in place     Other specified diabetes mellitus without " complications (Multi)     Diabetes mellitus of other type without complication    Personal history of other diseases of the circulatory system     History of hypertension    Seizure disorder (Multi)      Past Surgical History:   Procedure Laterality Date    BI STEREOTACTIC GUIDED BREAST RIGHT LOCALIZATION AND BIOPSY Right 02/02/2018    BI STEREOTACTIC GUIDED BREAST LOCALIZATION AND BIOPSY RIGHT [facility]    KNEE SURGERY Right 1989    ARTHROSCOPY    OTHER SURGICAL HISTORY  05/25/2021    Craniotomy    TUBAL LIGATION Bilateral 1982     Family History   Problem Relation Name Age of Onset    Diabetes Mother      Heart disease Mother      Heart disease Father       Social History     Socioeconomic History    Marital status:      Spouse name: Not on file    Number of children: Not on file    Years of education: Not on file    Highest education level: Not on file   Occupational History    Not on file   Tobacco Use    Smoking status: Never     Passive exposure: Never    Smokeless tobacco: Never   Vaping Use    Vaping status: Never Used   Substance and Sexual Activity    Alcohol use: Not Currently    Drug use: Never    Sexual activity: Not on file   Other Topics Concern    Not on file   Social History Narrative    Not on file     Social Determinants of Health     Financial Resource Strain: Low Risk  (3/20/2024)    Overall Financial Resource Strain (CARDIA)     Difficulty of Paying Living Expenses: Not hard at all   Food Insecurity: No Food Insecurity (3/20/2024)    Hunger Vital Sign     Worried About Running Out of Food in the Last Year: Never true     Ran Out of Food in the Last Year: Never true   Transportation Needs: No Transportation Needs (3/20/2024)    PRAPARE - Transportation     Lack of Transportation (Medical): No     Lack of Transportation (Non-Medical): No   Physical Activity: Inactive (3/20/2024)    Exercise Vital Sign     Days of Exercise per Week: 0 days     Minutes of Exercise per Session: 0  min   Stress: No Stress Concern Present (3/20/2024)    [employer] - Occupational Stress Questionnaire     Feeling of Stress : Not at all   Social Connections: Socially Isolated (3/20/2024)    Social Connection and Isolation Panel [NHANES]     Frequency of Communication with Friends and Family: More than three times a week     Frequency of Social Gatherings with Friends and Family: Never     Attends Rastafari Services: Never     Active Member of Clubs or Organizations: No     Attends Club or Organization Meetings: Never     Marital Status:    Intimate Partner Violence: Not At Risk (3/20/2024)    Humiliation, Afraid, Rape, and Kick questionnaire     Fear of Current or Ex-Partner: No     Emotionally Abused: No     Physically Abused: No     Sexually Abused: No   Housing Stability: Low Risk  (3/20/2024)    Housing Stability Vital Sign     Unable to Pay for Housing in the Last Year: No     Number of Places Lived in the Last Year: 1     Unstable Housing in the Last Year: No     Allergies   Allergen Reactions    Sulfa (Sulfonamide Antibiotics) Hives    Sulfamethoxazole-Trimethoprim Hives    Erythromycin Other    Amoxicillin-Pot Clavulanate Diarrhea     Current Outpatient Medications on File Prior to Visit   Medication Sig Dispense Refill    blood sugar diagnostic (Accu-Chek Guide test strips) strip USE TO TEST 10 TIMES DAILY 900 strip 3    insulin aspart, with niacinamide, (Fiasp U-100 Insulin) 100 unit/mL injection UP  UNITS DAILY VIA INSULIN PUMP 90 mL 3    patient supplied pump INSULIN PUMP- PATIENT SUPPLIED Inject 0-1 each (0-100 Units) under the skin.      [DISCONTINUED] amLODIPine (Norvasc) 5 mg tablet Take 1 tablet (5 mg) by mouth once daily. 90 tablet 3    [DISCONTINUED] levETIRAcetam (Keppra) 750 mg tablet Take 1 tablet (750 mg) by mouth 2 times a day.      [DISCONTINUED] LevoxyL 137 mcg tablet 1 TABLET IN THE MORNING ON AN EMPTY STOMACH ORALLY ONCE A DAY HOLD ON SUNDAY  90 DAYS 90 tablet 3    [DISCONTINUED] losartan (Cozaar) 100 mg tablet Take 1 tablet (100 mg) by mouth once daily.      [DISCONTINUED] oxybutynin XL (Ditropan-XL) 10 mg 24 hr tablet Take 1 tablet (10 mg) by mouth once daily.      [DISCONTINUED] pantoprazole (ProtoNix) 40 mg EC tablet Take 1 tablet (40 mg) by mouth once daily in the morning. Take before meals.      [DISCONTINUED] simvastatin (Zocor) 20 mg tablet TAKE 1 TABLET EVERY EVENING 90 tablet 3     No current facility-administered medications on file prior to visit.     Immunization History   Administered Date(s) Administered    Flu vaccine, quadrivalent, high-dose, preservative free, age 65y+ (FLUZONE) 09/11/2020, 09/28/2020, 10/04/2021, 09/19/2022, 09/29/2023    Flu vaccine, trivalent, preservative free, HIGH-DOSE, age 65y+ (Fluzone) 10/02/2018    Flu vaccine, trivalent, preservative free, age 6 months and greater (Fluarix/Fluzone/Flulaval) 10/06/2008    Influenza, injectable, quadrivalent 10/30/2017, 09/26/2019, 09/30/2022    Influenza, seasonal, injectable 10/01/2012, 10/01/2014, 10/15/2014, 09/24/2015    Novel influenza-H1N1-09, preservative-free 11/15/2009    Pfizer COVID-19 vaccine, Fall 2023, 12 years and older, (30mcg/0.3mL) 12/12/2023    Pfizer COVID-19 vaccine, bivalent, age 12 years and older (30 mcg/0.3 mL) 12/19/2022    Pfizer Purple Cap SARS-CoV-2 03/04/2021, 04/01/2021, 11/08/2021    Pneumococcal conjugate vaccine, 13-valent (PREVNAR 13) 11/22/2012    RESPIRATORY SYNCYTIAL VIRUS (RSV), ELIGIBLE PREGNANT PTS, 0.5 ML (ABRYSVO) 09/29/2023    Tdap vaccine, age 7 year and older (BOOSTRIX, ADACEL) 06/11/2016    Zoster, live 12/11/2009     Patient's medical history was reviewed and updated either before or during this encounter.       En Yee MD

## 2024-08-12 ENCOUNTER — TELEPHONE (OUTPATIENT)
Dept: ENDOCRINOLOGY | Facility: CLINIC | Age: 79
End: 2024-08-12

## 2024-08-12 ENCOUNTER — APPOINTMENT (OUTPATIENT)
Dept: ENDOCRINOLOGY | Facility: CLINIC | Age: 79
End: 2024-08-12
Payer: MEDICARE

## 2024-08-22 ENCOUNTER — APPOINTMENT (OUTPATIENT)
Dept: PRIMARY CARE | Facility: CLINIC | Age: 79
End: 2024-08-22
Payer: MEDICARE

## 2024-09-04 ENCOUNTER — TELEPHONE (OUTPATIENT)
Dept: ENDOCRINOLOGY | Facility: CLINIC | Age: 79
End: 2024-09-04

## 2024-09-04 ENCOUNTER — APPOINTMENT (OUTPATIENT)
Dept: ENDOCRINOLOGY | Facility: CLINIC | Age: 79
End: 2024-09-04
Payer: MEDICARE

## 2024-09-04 VITALS
SYSTOLIC BLOOD PRESSURE: 132 MMHG | HEART RATE: 49 BPM | WEIGHT: 165.6 LBS | DIASTOLIC BLOOD PRESSURE: 68 MMHG | BODY MASS INDEX: 29.33 KG/M2

## 2024-09-04 DIAGNOSIS — I10 ESSENTIAL HYPERTENSION: ICD-10-CM

## 2024-09-04 DIAGNOSIS — E10.65 TYPE 1 DIABETES MELLITUS WITH HYPERGLYCEMIA (MULTI): Primary | ICD-10-CM

## 2024-09-04 DIAGNOSIS — E78.2 MIXED HYPERLIPIDEMIA: ICD-10-CM

## 2024-09-04 LAB — POC HEMOGLOBIN A1C: 7.4 % (ref 4.2–6.5)

## 2024-09-04 PROCEDURE — 99214 OFFICE O/P EST MOD 30 MIN: CPT | Performed by: INTERNAL MEDICINE

## 2024-09-04 PROCEDURE — 83036 HEMOGLOBIN GLYCOSYLATED A1C: CPT | Performed by: INTERNAL MEDICINE

## 2024-09-04 PROCEDURE — 95251 CONT GLUC MNTR ANALYSIS I&R: CPT | Performed by: INTERNAL MEDICINE

## 2024-09-04 NOTE — PROGRESS NOTES
Attestation signed by Kel Pérez MD on 9/4/24 at 9:44 AM.    I, Dr Kel Pérez, have reviewed this progress note, medication list, vital signs, any pertinent lab values, and any CGM data if present with the [facility] and  face to face during this visit today. This note reflects the treatment plan that was made under my direction after reviewing the above mentioned elements while face to face with the patient and CDE.  I personally answered and addressed any questions and concerns the patient had during the visit today.  The CDE entered the data in this note under my direction and I personally reviewed it, signed any lab or medication orders that I instructed to be completed. I am the billing provider for this visit and the level of service was determined by my involvement in the Medical Decision Making Component of this visit while face to face with the patient.

## 2024-09-04 NOTE — PROGRESS NOTES
HPI     Presents for follow up/metabolic management 77 yo with DM Type 1 diagnosed at age 27. History HTN, HLD, Hypothyroidism, GERD and Seizures.  A1c- 6.8% last visit,  7.4% today.      Pt is testing >4 times/day using guardian cgm.  Following carb controlled diet and knows reasonable carb allowances. Patient able to afford their medications. Patient is exercising/very active.    -CGM   Guardian smart guard with Mini-Medtronic 780G     -HTN   Losartan, Amlodipine daily     -STATIN   Simvastatin 20mg (Jan 2024 labs ldl-70)    -PUMP:  medtronic 780G with guardian cgm in automode   Basal  0000  0.50  0300  0.70  0600  0.60  2000  0.95  ICR    0000  1:20   0700  1:15  ISF    0000  50  TARGET   0000 120-120    -levemir flexpen U100 sample provided for pump failure, exp 02/28/2026, [id number]      14 days carelink data reviewed & attached:   71% in target,  0% lows,  patterns, roller coaster like pattern most days,  variable readings with many peaks/valleys often still remaining within target range despite   -fear of lows,  treats if/when sugar drops <110.  Often treating with OJ +/- additional carb source (cookie, etc)        Current Outpatient Medications:     amLODIPine (Norvasc) 5 mg tablet, Take 1 tablet (5 mg) by mouth once daily., Disp: , Rfl:     blood sugar diagnostic (Accu-Chek Guide test strips) strip, USE TO TEST 10 TIMES DAILY, Disp: 900 strip, Rfl: 3    insulin aspart, with niacinamide, (Fiasp U-100 Insulin) 100 unit/mL injection, UP  UNITS DAILY VIA INSULIN PUMP, Disp: 90 mL, Rfl: 3    levETIRAcetam (Keppra) 750 mg tablet, Take 1 tablet (750 mg) by mouth 2 times a day., Disp: , Rfl:     LevoxyL 137 mcg tablet, One tablet once/day for six days/week. Hold on Sundays., Disp: 90 tablet, Rfl: 3    losartan (Cozaar) 100 mg tablet, Take 1 tablet (100 mg) by mouth once daily., Disp: , Rfl:     multivitamin tablet, Take 1 tablet by mouth once daily., Disp: , Rfl:     oxybutynin XL (Ditropan-XL) 10 mg 24 hr  tablet, Take 1 tablet (10 mg) by mouth once daily., Disp: , Rfl:     pantoprazole (ProtoNix) 40 mg EC tablet, Take 1 tablet (40 mg) by mouth once daily in the morning. Take before meals., Disp: , Rfl:     patient supplied pump INSULIN PUMP- PATIENT SUPPLIED, Inject 0-1 each (0-100 Units) under the skin., Disp: , Rfl:     simvastatin (Zocor) 20 mg tablet, Take 1 tablet (20 mg) by mouth once daily in the evening., Disp: , Rfl:     Allergies as of 09/04/2024 - Reviewed 09/04/2024   Allergen Reaction Noted    Sulfa (sulfonamide antibiotics) Hives 09/02/2023    Sulfamethoxazole-trimethoprim Hives 09/02/2023    Erythromycin Other 09/02/2023    Amoxicillin-pot clavulanate Diarrhea 09/02/2023       /68   Pulse (!) 49   Wt 75.1 kg (165 lb 9.6 oz)   BMI 29.33 kg/m²     Labs:   Lab Results   Component Value Date    WBC 4.0 (L) 04/30/2024    NRBC 0.0 04/30/2024    RBC 4.44 04/30/2024    HGB 13.6 04/30/2024    HCT 41.8 04/30/2024     04/30/2024     Lab Results   Component Value Date    CALCIUM 9.0 04/30/2024    AST 17 04/30/2024    ALKPHOS 85 04/30/2024    BILITOT 0.7 04/30/2024    PROT 5.9 04/30/2024    ALBUMIN 3.9 04/30/2024    GLOB 2.2 04/21/2023    AGR 1.8 04/21/2023     04/30/2024    K 4.4 04/30/2024     04/30/2024    CO2 26 04/30/2024    ANIONGAP 9 04/30/2024    BUN 23 04/30/2024    CREATININE 0.80 04/30/2024    UREACREAUR 20.0 04/21/2023    GLUCOSE 220 (H) 04/30/2024    ALT 20 04/30/2024    EGFR 76 04/30/2024     Lab Results   Component Value Date    CHOL 164 01/10/2024    TRIG 65 01/10/2024    HDL 81.0 01/10/2024    LDLCALC 70 01/10/2024     Lab Results   Component Value Date    MICROALBCREA  04/30/2024      Comment:      One or more analytes used in this calculation is outside of the analytical measurement range. Calculation cannot be performed.     Lab Results   Component Value Date    TSH 0.99 04/30/2024     Lab Results   Component Value Date    VHAWVHKV87 404 01/09/2023     Lab Results    Component Value Date    HGBA1C 7.4 (A) 09/04/2024         Assessment/Plan   1. Type 1 diabetes mellitus with hyperglycemia (Multi)    -A1c ordered & reviewed  -guardian/medtronic data reviewed   -labs reviewed     -purposefully underbolusing at dinner to be higher into hs d/t fear of lows, resulting in sugars lower end target range overnight  -work on entering full grams carb at dinner meal for now  -may need to adjust dinner ratio, will reassess future visit       -again spent a lot of time discussing AID algorithm,  best practices/recs for success etc.   -again stressed importance avoiding overtreating low blood sugars   -activity mode is an option    2. Mixed hyperlipidemia  -on statin, tolerating, reviewed labs, no change    3. Essential hypertension  -at target on therapy after resting         Follow up: 3mon gavin     -labs/tests/notes reviewed  -reviewed and counseled patient on medication monitoring and side effects    Treatment and plan discussed with Dr. Pérez.  JILL Gibson, PharmD, BC-ADM, [facility].     Medical Decision Making  Complexity of problem: Chronic illness of diabetes mellitus uncontrolled, progressing  Data analyzed and reviewed: Reviewed prior notes, blood glucose data, labs including HgbA1c, lipids, serum chemistries.  Ordered tests.   Risk of complications and morbidities: Is definite because of use of insulin and risk of hypoglycemia.  Prescription medications reviewed and modifications made.  Compliance assessed.  Addressed social determinants of health including food insecurity.

## 2024-09-09 ENCOUNTER — OFFICE VISIT (OUTPATIENT)
Dept: CARDIOLOGY | Facility: CLINIC | Age: 79
End: 2024-09-09
Payer: MEDICARE

## 2024-09-09 VITALS
SYSTOLIC BLOOD PRESSURE: 140 MMHG | BODY MASS INDEX: 29.05 KG/M2 | HEART RATE: 56 BPM | DIASTOLIC BLOOD PRESSURE: 70 MMHG | WEIGHT: 164 LBS

## 2024-09-09 DIAGNOSIS — I47.10 SVT (SUPRAVENTRICULAR TACHYCARDIA) (CMS-HCC): Primary | ICD-10-CM

## 2024-09-09 DIAGNOSIS — I10 PRIMARY HYPERTENSION: ICD-10-CM

## 2024-09-09 DIAGNOSIS — E78.2 MIXED HYPERLIPIDEMIA: ICD-10-CM

## 2024-09-09 DIAGNOSIS — I35.1 NONRHEUMATIC AORTIC VALVE INSUFFICIENCY: ICD-10-CM

## 2024-09-09 PROCEDURE — 1036F TOBACCO NON-USER: CPT | Performed by: INTERNAL MEDICINE

## 2024-09-09 PROCEDURE — 1126F AMNT PAIN NOTED NONE PRSNT: CPT | Performed by: INTERNAL MEDICINE

## 2024-09-09 PROCEDURE — 99213 OFFICE O/P EST LOW 20 MIN: CPT | Performed by: INTERNAL MEDICINE

## 2024-09-09 PROCEDURE — 1159F MED LIST DOCD IN RCRD: CPT | Performed by: INTERNAL MEDICINE

## 2024-09-09 PROCEDURE — 3077F SYST BP >= 140 MM HG: CPT | Performed by: INTERNAL MEDICINE

## 2024-09-09 PROCEDURE — 3078F DIAST BP <80 MM HG: CPT | Performed by: INTERNAL MEDICINE

## 2024-09-09 RX ORDER — AMLODIPINE BESYLATE 10 MG/1
10 TABLET ORAL DAILY
Qty: 90 TABLET | Refills: 3 | Status: SHIPPED | OUTPATIENT
Start: 2024-09-09 | End: 2025-09-09

## 2024-09-09 ASSESSMENT — ENCOUNTER SYMPTOMS
DEPRESSION: 0
COUGH: 0
OCCASIONAL FEELINGS OF UNSTEADINESS: 0
SHORTNESS OF BREATH: 0
LOSS OF SENSATION IN FEET: 0
DYSURIA: 0
BLURRED VISION: 0
ABDOMINAL PAIN: 0
DYSPNEA ON EXERTION: 0
HEMATURIA: 0
NUMBNESS: 0
PALPITATIONS: 0
PARESTHESIAS: 0

## 2024-09-09 ASSESSMENT — PAIN SCALES - GENERAL: PAINLEVEL: 0-NO PAIN

## 2024-09-09 ASSESSMENT — PATIENT HEALTH QUESTIONNAIRE - PHQ9
1. LITTLE INTEREST OR PLEASURE IN DOING THINGS: NOT AT ALL
2. FEELING DOWN, DEPRESSED OR HOPELESS: NOT AT ALL
SUM OF ALL RESPONSES TO PHQ9 QUESTIONS 1 AND 2: 0

## 2024-09-09 NOTE — ASSESSMENT & PLAN NOTE
Blood pressure mildly elevated today.  Will increase the amlodipine from 5 mg to 10 mg daily.  Stressed hygienic measures and recheck blood pressure on her return visit.

## 2024-09-09 NOTE — ASSESSMENT & PLAN NOTE
No symptomatic palpitations.  Only 2 very short bursts of SVT seen on patch monitor.  Will just follow clinically at this point in time since he is asymptomatic.

## 2024-09-09 NOTE — ASSESSMENT & PLAN NOTE
Only a mild degree of aortic valve regurgitation on echocardiogram study.  Will plan on repeating echocardiogram after next follow-up visit.

## 2024-09-09 NOTE — PROGRESS NOTES
Subjective   Pat Jaimes is a 78 y.o. female.    Chief Complaint:  Hospital Follow-up (Monitor results)    HPI  Patient had a syncopal episode back in March.  She has had no reoccurrences and overall is doing quite well.  But Dr. Yee ordered a patch monitor and suggested follow-up visit with myself.    Review of Systems   Constitutional: Negative for malaise/fatigue.   HENT:  Negative for congestion.    Eyes:  Negative for blurred vision.   Cardiovascular:  Negative for chest pain, dyspnea on exertion and palpitations.   Respiratory:  Negative for cough and shortness of breath.    Musculoskeletal:  Negative for joint pain.   Gastrointestinal:  Negative for abdominal pain.   Genitourinary:  Negative for dysuria and hematuria.   Neurological:  Negative for numbness and paresthesias.       Objective   Constitutional:       Appearance: Not in distress.   Eyes:      Conjunctiva/sclera: Conjunctivae normal.   Neck:      Vascular: JVD normal.   Pulmonary:      Breath sounds: Normal breath sounds. No wheezing. No rhonchi. No rales.   Cardiovascular:      Normal rate. Regular rhythm.      Murmurs: There is a grade 1/6 early systolic murmur.      No gallop.  No click. No rub.   Abdominal:      Palpations: Abdomen is soft.   Neurological:      General: No focal deficit present.      Mental Status: Alert.         Lab Review:       Assessment/Plan   The primary encounter diagnosis was SVT (supraventricular tachycardia) (CMS-HCC). Diagnoses of Nonrheumatic aortic valve insufficiency, Primary hypertension, and Mixed hyperlipidemia were also pertinent to this visit.    HLD (hyperlipidemia)  Blood pressure mildly elevated today.  Will increase the amlodipine from 5 mg to 10 mg daily.  Stressed hygienic measures and recheck blood pressure on her return visit.    Nonrheumatic aortic valve insufficiency  Only a mild degree of aortic valve regurgitation on echocardiogram study.  Will plan on repeating echocardiogram after next  follow-up visit.    SVT (supraventricular tachycardia) (CMS-HCC)  No symptomatic palpitations.  Only 2 very short bursts of SVT seen on patch monitor.  Will just follow clinically at this point in time since he is asymptomatic.

## 2024-09-16 ENCOUNTER — LAB (OUTPATIENT)
Dept: LAB | Facility: LAB | Age: 79
End: 2024-09-16
Payer: MEDICARE

## 2024-09-16 DIAGNOSIS — S06.5XAA TRAUMATIC SUBDURAL HEMORRHAGE WITH LOSS OF CONSCIOUSNESS STATUS UNKNOWN, INITIAL ENCOUNTER (MULTI): Primary | ICD-10-CM

## 2024-09-16 LAB — LEVETIRACETAM SERPL-MCNC: 32 UG/ML (ref 10–40)

## 2024-09-16 PROCEDURE — 80177 DRUG SCRN QUAN LEVETIRACETAM: CPT

## 2024-09-16 PROCEDURE — 36415 COLL VENOUS BLD VENIPUNCTURE: CPT

## 2024-09-25 NOTE — PROGRESS NOTES
Annual-menopause  Subjective   Pat Jaimes is a 78 y.o. female who is here for a menopausal gyn exam.   Complaints: denies vag bleed or discharge; denies pelvic  pain, pressure, or persistent bloating.   Interval med hx pos for fainting episode/cause undetermined; advised to hydrate more; ongoing glycemic control issues/has insulin pump/continuous monitor x 6 months now.  PMHx: cardio Samsa       TYPE 1 DM; endo Dr. Pérez/Agnieszka       HTN.    HPL.   HYPOTHYROIDISM.  Vertigo.       Traumatic Subdermal hematoma.       Esophageal reflux (Dr. August---).       overactive bladder (Dr. Lucero).       seizures;  Dr. Gan.       Osteoporosis at spine per dexa .  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 Alcohol, 1glass of wine nightly with dinner.  Trying to stay active but has some fears of falling since her fall in  that resulted in a subdural hematoma.  Last pap 2013- neg, 2010 NEG HPV NEG.   Mamm 10/06/2023-NEG   DEXA  2022=spine-2.5/hip-0.9  Periods : Menopausal. not currently sexually active; 26.   Total preg 0.   Review of Systems   Constitutional:  Negative for activity change, fatigue and unexpected weight change.   Respiratory:  Negative for chest tightness and shortness of breath.    Cardiovascular:  Negative for chest pain and leg swelling.   Gastrointestinal:  Negative for abdominal distention and abdominal pain.   Genitourinary:  Negative for difficulty urinating, dysuria, genital sores, pelvic pain, vaginal bleeding, vaginal discharge and vaginal pain.    Musculoskeletal:  Negative for gait problem and joint swelling.   Skin:  Negative for color change and rash.   Neurological:  Positive for dizziness and syncope. Negative for weakness and headaches.   Hematological:  Negative for adenopathy.   Objective Visit Vitals  /60   Wt 74.9 kg (165 lb 3.2 oz)   BMI 29.26 kg/m²   OB Status Postmenopausal   Smoking Status Never   BSA 1.82 m²       General:   Alert and oriented, in no acute distress   Neck: Supple. No visible thyromegaly.    Breast/Axilla: Normal to palpation bilaterally without masses, skin changes, or nipple discharge.    Abdomen: Soft, non-tender, without masses or organomegaly   Vulva: Normal architecture without erythema, masses, or lesions.    Vagina:  mucosa without lesions, masses.  Positive atrophy. No abnormal vaginal discharge.    Cervix: Normal without masses, lesions, or signs of cervicitis.    Uterus: Normal mobile, non-enlarged uterus    Adnexa: No palpable masses or tenderness   Pelvic Floor No POP noted. No high tone pelvic floor    Psych  Rectal Normal affect. Normal mood.      Assessment/Plan   Encounter Diagnoses   Name Primary?    Menopause; grossly nl breast/gyn exams. Yes    Encounter for screening mammogram for malignant neoplasm of breast; order placed.     Postmenopausal bone loss; dexa ordered     Postmenopausal atrophic vaginitis; needs extra care w speculum exam.    Procedures  Time Based Billing 2021:          Prep time:  ___5__ minutes.          Additional history:  __10___ minutes.          Face to Face time w patient/family/caregiver:  ___20__ minutes.          Counseling/Coordination of care:  ___5__ minutes.          Ordering medication/testing/procedures:  2____ minutes.          External communications:  ___0__ minutes.          Documentation time:  ____3_ minutes.          Total time on date of encounter:             Total minutes:  28    Karin Cordoba MD

## 2024-09-26 ENCOUNTER — OFFICE VISIT (OUTPATIENT)
Dept: OBSTETRICS AND GYNECOLOGY | Facility: CLINIC | Age: 79
End: 2024-09-26
Payer: MEDICARE

## 2024-09-26 VITALS — DIASTOLIC BLOOD PRESSURE: 60 MMHG | WEIGHT: 165.2 LBS | SYSTOLIC BLOOD PRESSURE: 146 MMHG | BODY MASS INDEX: 29.26 KG/M2

## 2024-09-26 DIAGNOSIS — M81.0 POSTMENOPAUSAL BONE LOSS: ICD-10-CM

## 2024-09-26 DIAGNOSIS — Z12.31 ENCOUNTER FOR SCREENING MAMMOGRAM FOR MALIGNANT NEOPLASM OF BREAST: ICD-10-CM

## 2024-09-26 DIAGNOSIS — Z78.0 MENOPAUSE: Primary | ICD-10-CM

## 2024-09-26 DIAGNOSIS — N95.2 POSTMENOPAUSAL ATROPHIC VAGINITIS: ICD-10-CM

## 2024-09-26 PROCEDURE — 1160F RVW MEDS BY RX/DR IN RCRD: CPT | Performed by: OBSTETRICS & GYNECOLOGY

## 2024-09-26 PROCEDURE — 3078F DIAST BP <80 MM HG: CPT | Performed by: OBSTETRICS & GYNECOLOGY

## 2024-09-26 PROCEDURE — 3077F SYST BP >= 140 MM HG: CPT | Performed by: OBSTETRICS & GYNECOLOGY

## 2024-09-26 PROCEDURE — 99214 OFFICE O/P EST MOD 30 MIN: CPT | Performed by: OBSTETRICS & GYNECOLOGY

## 2024-09-26 PROCEDURE — 1159F MED LIST DOCD IN RCRD: CPT | Performed by: OBSTETRICS & GYNECOLOGY

## 2024-09-26 PROCEDURE — 1126F AMNT PAIN NOTED NONE PRSNT: CPT | Performed by: OBSTETRICS & GYNECOLOGY

## 2024-09-26 ASSESSMENT — ENCOUNTER SYMPTOMS
ADENOPATHY: 0
WEAKNESS: 0
ACTIVITY CHANGE: 0
OCCASIONAL FEELINGS OF UNSTEADINESS: 0
COLOR CHANGE: 0
CHEST TIGHTNESS: 0
ABDOMINAL PAIN: 0
JOINT SWELLING: 0
DIFFICULTY URINATING: 0
SHORTNESS OF BREATH: 0
FATIGUE: 0
HEADACHES: 0
ABDOMINAL DISTENTION: 0
DYSURIA: 0
UNEXPECTED WEIGHT CHANGE: 0
DEPRESSION: 0
LOSS OF SENSATION IN FEET: 0

## 2024-09-26 ASSESSMENT — PATIENT HEALTH QUESTIONNAIRE - PHQ9
2. FEELING DOWN, DEPRESSED OR HOPELESS: NOT AT ALL
1. LITTLE INTEREST OR PLEASURE IN DOING THINGS: NOT AT ALL
SUM OF ALL RESPONSES TO PHQ9 QUESTIONS 1 & 2: 0
2. FEELING DOWN, DEPRESSED OR HOPELESS: NOT AT ALL
1. LITTLE INTEREST OR PLEASURE IN DOING THINGS: NOT AT ALL
SUM OF ALL RESPONSES TO PHQ9 QUESTIONS 1 & 2: 0

## 2024-09-26 ASSESSMENT — PAIN SCALES - GENERAL: PAINLEVEL: 0-NO PAIN

## 2024-09-27 ASSESSMENT — ENCOUNTER SYMPTOMS: DIZZINESS: 1

## 2024-10-01 DIAGNOSIS — I10 PRIMARY HYPERTENSION: ICD-10-CM

## 2024-10-01 RX ORDER — LOSARTAN POTASSIUM 100 MG/1
100 TABLET ORAL DAILY
Qty: 90 TABLET | Refills: 3 | Status: SHIPPED | OUTPATIENT
Start: 2024-10-01

## 2024-10-23 ENCOUNTER — HOSPITAL ENCOUNTER (OUTPATIENT)
Dept: RADIOLOGY | Facility: HOSPITAL | Age: 79
Discharge: HOME | End: 2024-10-23
Payer: MEDICARE

## 2024-10-23 VITALS — WEIGHT: 160 LBS | HEIGHT: 63 IN | BODY MASS INDEX: 28.35 KG/M2

## 2024-10-23 DIAGNOSIS — M81.0 POSTMENOPAUSAL BONE LOSS: ICD-10-CM

## 2024-10-23 DIAGNOSIS — Z78.0 MENOPAUSE: ICD-10-CM

## 2024-10-23 DIAGNOSIS — Z12.31 ENCOUNTER FOR SCREENING MAMMOGRAM FOR MALIGNANT NEOPLASM OF BREAST: ICD-10-CM

## 2024-10-23 PROCEDURE — 77080 DXA BONE DENSITY AXIAL: CPT | Performed by: RADIOLOGY

## 2024-10-23 PROCEDURE — 77080 DXA BONE DENSITY AXIAL: CPT

## 2024-10-23 PROCEDURE — 77067 SCR MAMMO BI INCL CAD: CPT

## 2024-11-15 ENCOUNTER — LAB (OUTPATIENT)
Dept: LAB | Facility: LAB | Age: 79
End: 2024-11-15
Payer: MEDICARE

## 2024-11-15 DIAGNOSIS — I10 PRIMARY HYPERTENSION: ICD-10-CM

## 2024-11-15 DIAGNOSIS — G40.909 SEIZURE DISORDER (MULTI): ICD-10-CM

## 2024-11-15 DIAGNOSIS — E10.69 TYPE 1 DIABETES MELLITUS WITH OTHER SPECIFIED COMPLICATION: ICD-10-CM

## 2024-11-15 DIAGNOSIS — E06.3 HYPOTHYROIDISM DUE TO HASHIMOTO'S THYROIDITIS: ICD-10-CM

## 2024-11-15 DIAGNOSIS — E78.2 MIXED HYPERLIPIDEMIA: ICD-10-CM

## 2024-11-15 DIAGNOSIS — M81.0 AGE-RELATED OSTEOPOROSIS WITHOUT CURRENT PATHOLOGICAL FRACTURE: ICD-10-CM

## 2024-11-15 DIAGNOSIS — Z01.89 ENCOUNTER FOR ROUTINE LABORATORY TESTING: ICD-10-CM

## 2024-11-15 DIAGNOSIS — E55.9 VITAMIN D DEFICIENCY: ICD-10-CM

## 2024-11-15 LAB
ALBUMIN SERPL BCP-MCNC: 4.2 G/DL (ref 3.4–5)
ALP SERPL-CCNC: 80 U/L (ref 33–136)
ALT SERPL W P-5'-P-CCNC: 31 U/L (ref 7–45)
ANION GAP SERPL CALCULATED.3IONS-SCNC: 10 MMOL/L (ref 10–20)
AST SERPL W P-5'-P-CCNC: 23 U/L (ref 9–39)
BASOPHILS # BLD AUTO: 0.05 X10*3/UL (ref 0–0.1)
BASOPHILS NFR BLD AUTO: 1 %
BILIRUB DIRECT SERPL-MCNC: 0.2 MG/DL (ref 0–0.3)
BILIRUB SERPL-MCNC: 1.1 MG/DL (ref 0–1.2)
BUN SERPL-MCNC: 20 MG/DL (ref 6–23)
CALCIUM SERPL-MCNC: 9.4 MG/DL (ref 8.6–10.3)
CHLORIDE SERPL-SCNC: 106 MMOL/L (ref 98–107)
CHOLEST SERPL-MCNC: 167 MG/DL (ref 0–199)
CHOLEST/HDLC SERPL: 2.1 {RATIO}
CO2 SERPL-SCNC: 31 MMOL/L (ref 21–32)
CREAT SERPL-MCNC: 0.74 MG/DL (ref 0.5–1.05)
EGFRCR SERPLBLD CKD-EPI 2021: 83 ML/MIN/1.73M*2
EOSINOPHIL # BLD AUTO: 0.11 X10*3/UL (ref 0–0.4)
EOSINOPHIL NFR BLD AUTO: 2.1 %
ERYTHROCYTE [DISTWIDTH] IN BLOOD BY AUTOMATED COUNT: 12.3 % (ref 11.5–14.5)
EST. AVERAGE GLUCOSE BLD GHB EST-MCNC: 166 MG/DL
GLUCOSE SERPL-MCNC: 197 MG/DL (ref 74–99)
HBA1C MFR BLD: 7.4 %
HCT VFR BLD AUTO: 44.4 % (ref 36–46)
HDLC SERPL-MCNC: 78.8 MG/DL
HGB BLD-MCNC: 14.5 G/DL (ref 12–16)
IMM GRANULOCYTES # BLD AUTO: 0.01 X10*3/UL (ref 0–0.5)
IMM GRANULOCYTES NFR BLD AUTO: 0.2 % (ref 0–0.9)
LDLC SERPL CALC-MCNC: 79 MG/DL
LYMPHOCYTES # BLD AUTO: 1.33 X10*3/UL (ref 0.8–3)
LYMPHOCYTES NFR BLD AUTO: 26 %
MCH RBC QN AUTO: 30.6 PG (ref 26–34)
MCHC RBC AUTO-ENTMCNC: 32.7 G/DL (ref 32–36)
MCV RBC AUTO: 94 FL (ref 80–100)
MONOCYTES # BLD AUTO: 0.34 X10*3/UL (ref 0.05–0.8)
MONOCYTES NFR BLD AUTO: 6.6 %
NEUTROPHILS # BLD AUTO: 3.28 X10*3/UL (ref 1.6–5.5)
NEUTROPHILS NFR BLD AUTO: 64.1 %
NON HDL CHOLESTEROL: 88 MG/DL (ref 0–149)
NRBC BLD-RTO: 0 /100 WBCS (ref 0–0)
PLATELET # BLD AUTO: 250 X10*3/UL (ref 150–450)
POTASSIUM SERPL-SCNC: 4.5 MMOL/L (ref 3.5–5.3)
PROT SERPL-MCNC: 6.3 G/DL (ref 6.4–8.2)
RBC # BLD AUTO: 4.74 X10*6/UL (ref 4–5.2)
SODIUM SERPL-SCNC: 142 MMOL/L (ref 136–145)
TRIGL SERPL-MCNC: 48 MG/DL (ref 0–149)
TSH SERPL-ACNC: 1.75 MIU/L (ref 0.44–3.98)
VLDL: 10 MG/DL (ref 0–40)
WBC # BLD AUTO: 5.1 X10*3/UL (ref 4.4–11.3)

## 2024-11-15 PROCEDURE — 36415 COLL VENOUS BLD VENIPUNCTURE: CPT

## 2024-11-15 PROCEDURE — 80061 LIPID PANEL: CPT

## 2024-11-15 PROCEDURE — 83036 HEMOGLOBIN GLYCOSYLATED A1C: CPT

## 2024-11-15 PROCEDURE — 84443 ASSAY THYROID STIM HORMONE: CPT

## 2024-11-15 PROCEDURE — 82248 BILIRUBIN DIRECT: CPT

## 2024-11-15 PROCEDURE — 80053 COMPREHEN METABOLIC PANEL: CPT

## 2024-11-15 PROCEDURE — 82306 VITAMIN D 25 HYDROXY: CPT

## 2024-11-15 PROCEDURE — 85025 COMPLETE CBC W/AUTO DIFF WBC: CPT

## 2024-11-16 LAB — 25(OH)D3 SERPL-MCNC: 25 NG/ML (ref 30–100)

## 2024-11-22 ENCOUNTER — OFFICE VISIT (OUTPATIENT)
Dept: PRIMARY CARE | Facility: CLINIC | Age: 79
End: 2024-11-22
Payer: MEDICARE

## 2024-11-22 VITALS
HEIGHT: 63 IN | OXYGEN SATURATION: 98 % | HEART RATE: 67 BPM | WEIGHT: 169 LBS | SYSTOLIC BLOOD PRESSURE: 132 MMHG | DIASTOLIC BLOOD PRESSURE: 80 MMHG | BODY MASS INDEX: 29.95 KG/M2

## 2024-11-22 DIAGNOSIS — Z71.89 COUNSELING REGARDING ADVANCED CARE PLANNING AND GOALS OF CARE: ICD-10-CM

## 2024-11-22 DIAGNOSIS — Z23 ENCOUNTER FOR IMMUNIZATION: ICD-10-CM

## 2024-11-22 DIAGNOSIS — I47.10 SVT (SUPRAVENTRICULAR TACHYCARDIA) (CMS-HCC): ICD-10-CM

## 2024-11-22 DIAGNOSIS — E10.69 TYPE 1 DIABETES MELLITUS WITH OTHER SPECIFIED COMPLICATION: ICD-10-CM

## 2024-11-22 DIAGNOSIS — E06.3 HYPOTHYROIDISM DUE TO HASHIMOTO THYROIDITIS: ICD-10-CM

## 2024-11-22 DIAGNOSIS — M15.0 PRIMARY OSTEOARTHRITIS INVOLVING MULTIPLE JOINTS: ICD-10-CM

## 2024-11-22 DIAGNOSIS — H81.12 BPPV (BENIGN PAROXYSMAL POSITIONAL VERTIGO), LEFT: ICD-10-CM

## 2024-11-22 DIAGNOSIS — G40.909 SEIZURE DISORDER (MULTI): ICD-10-CM

## 2024-11-22 DIAGNOSIS — Z01.89 ENCOUNTER FOR ROUTINE LABORATORY TESTING: ICD-10-CM

## 2024-11-22 DIAGNOSIS — E55.9 VITAMIN D DEFICIENCY: ICD-10-CM

## 2024-11-22 DIAGNOSIS — G40.909 NONINTRACTABLE EPILEPSY WITHOUT STATUS EPILEPTICUS, UNSPECIFIED EPILEPSY TYPE (MULTI): ICD-10-CM

## 2024-11-22 DIAGNOSIS — M81.0 AGE-RELATED OSTEOPOROSIS WITHOUT CURRENT PATHOLOGICAL FRACTURE: ICD-10-CM

## 2024-11-22 DIAGNOSIS — Z00.00 ANNUAL PHYSICAL EXAM: Primary | ICD-10-CM

## 2024-11-22 DIAGNOSIS — K21.9 GASTROESOPHAGEAL REFLUX DISEASE WITHOUT ESOPHAGITIS: ICD-10-CM

## 2024-11-22 DIAGNOSIS — E78.2 MIXED HYPERLIPIDEMIA: ICD-10-CM

## 2024-11-22 DIAGNOSIS — I10 PRIMARY HYPERTENSION: ICD-10-CM

## 2024-11-22 PROBLEM — M19.90 OA (OSTEOARTHRITIS): Status: ACTIVE | Noted: 2024-11-22

## 2024-11-22 PROCEDURE — 99214 OFFICE O/P EST MOD 30 MIN: CPT | Performed by: STUDENT IN AN ORGANIZED HEALTH CARE EDUCATION/TRAINING PROGRAM

## 2024-11-22 PROCEDURE — 1036F TOBACCO NON-USER: CPT | Performed by: STUDENT IN AN ORGANIZED HEALTH CARE EDUCATION/TRAINING PROGRAM

## 2024-11-22 PROCEDURE — 1126F AMNT PAIN NOTED NONE PRSNT: CPT | Performed by: STUDENT IN AN ORGANIZED HEALTH CARE EDUCATION/TRAINING PROGRAM

## 2024-11-22 PROCEDURE — 1159F MED LIST DOCD IN RCRD: CPT | Performed by: STUDENT IN AN ORGANIZED HEALTH CARE EDUCATION/TRAINING PROGRAM

## 2024-11-22 PROCEDURE — 1160F RVW MEDS BY RX/DR IN RCRD: CPT | Performed by: STUDENT IN AN ORGANIZED HEALTH CARE EDUCATION/TRAINING PROGRAM

## 2024-11-22 PROCEDURE — 3075F SYST BP GE 130 - 139MM HG: CPT | Performed by: STUDENT IN AN ORGANIZED HEALTH CARE EDUCATION/TRAINING PROGRAM

## 2024-11-22 PROCEDURE — G0439 PPPS, SUBSEQ VISIT: HCPCS | Performed by: STUDENT IN AN ORGANIZED HEALTH CARE EDUCATION/TRAINING PROGRAM

## 2024-11-22 PROCEDURE — 90677 PCV20 VACCINE IM: CPT | Performed by: STUDENT IN AN ORGANIZED HEALTH CARE EDUCATION/TRAINING PROGRAM

## 2024-11-22 PROCEDURE — 99215 OFFICE O/P EST HI 40 MIN: CPT | Performed by: STUDENT IN AN ORGANIZED HEALTH CARE EDUCATION/TRAINING PROGRAM

## 2024-11-22 PROCEDURE — 1170F FXNL STATUS ASSESSED: CPT | Performed by: STUDENT IN AN ORGANIZED HEALTH CARE EDUCATION/TRAINING PROGRAM

## 2024-11-22 PROCEDURE — 1123F ACP DISCUSS/DSCN MKR DOCD: CPT | Performed by: STUDENT IN AN ORGANIZED HEALTH CARE EDUCATION/TRAINING PROGRAM

## 2024-11-22 PROCEDURE — 1158F ADVNC CARE PLAN TLK DOCD: CPT | Performed by: STUDENT IN AN ORGANIZED HEALTH CARE EDUCATION/TRAINING PROGRAM

## 2024-11-22 PROCEDURE — 3079F DIAST BP 80-89 MM HG: CPT | Performed by: STUDENT IN AN ORGANIZED HEALTH CARE EDUCATION/TRAINING PROGRAM

## 2024-11-22 RX ORDER — ACETAMINOPHEN 500 MG
500-1000 TABLET ORAL 3 TIMES DAILY PRN
COMMUNITY
Start: 2024-11-22

## 2024-11-22 RX ORDER — IBUPROFEN 200 MG
200-400 TABLET ORAL 3 TIMES DAILY PRN
COMMUNITY
Start: 2024-11-22

## 2024-11-22 RX ORDER — ACETAMINOPHEN 500 MG
2000 TABLET ORAL DAILY
COMMUNITY
Start: 2024-11-22

## 2024-11-22 RX ORDER — DICLOFENAC SODIUM 10 MG/G
4 GEL TOPICAL 3 TIMES DAILY PRN
COMMUNITY
Start: 2024-11-22

## 2024-11-22 ASSESSMENT — ACTIVITIES OF DAILY LIVING (ADL)
DOING_HOUSEWORK: INDEPENDENT
MANAGING_FINANCES: INDEPENDENT
TAKING_MEDICATION: INDEPENDENT
DRESSING: INDEPENDENT
BATHING: INDEPENDENT
GROCERY_SHOPPING: INDEPENDENT

## 2024-11-22 ASSESSMENT — ENCOUNTER SYMPTOMS
CONSTITUTIONAL NEGATIVE: 1
GASTROINTESTINAL NEGATIVE: 1
EYES NEGATIVE: 1
CARDIOVASCULAR NEGATIVE: 1
NEUROLOGICAL NEGATIVE: 1
ALLERGIC/IMMUNOLOGIC NEGATIVE: 1
MUSCULOSKELETAL NEGATIVE: 1
ENDOCRINE NEGATIVE: 1
PSYCHIATRIC NEGATIVE: 1
RESPIRATORY NEGATIVE: 1
HEMATOLOGIC/LYMPHATIC NEGATIVE: 1

## 2024-11-22 ASSESSMENT — PAIN SCALES - GENERAL: PAINLEVEL_OUTOF10: 0-NO PAIN

## 2024-11-22 NOTE — PROGRESS NOTES
[facility]: [facility]  MEDICARE WELLNESS EXAM      Pat Jaimes is a 79 y.o. female that is presenting today for Annual Exam.    Assessment/Plan    - Patient has been dealing with significant bilateral knee pain that is starting to keep her up at night. Patient notes that sleeping on her recliner has helped with this; however, she is now beginning to develop bilateral hip pain.  - Blood pressure at goal today.  - Encouraged continued dietary, exercise, and lifestyle modification.  - Significant medication and problem list reconciliation done today.     Discussed routine and/or preventative care with the patient as outlined below:  - Labwork:   - Patient had labwork done for this appointment. Discussed today.    - Sugars mildly elevated. Will defer management to endocrinology.   - Mild Vitamin D deficiency. Will adjust dosing on OTC supplementation.  - Will order labwork for the patient's next appointment. Encouraged the patient to get this labwork done one week prior to the next appointment.  - Imaging:   - Encouraged the patient to get her annual diabetic retinal exam.  - Patient does not appear to be due for routine imaging at this time.  - Immunizations:   - Encouraged the patient to get the pneumonia (prevnar-20) immunization.  - Encouraged the patient to get their shingles (shingrix) immunizations.    ADVANCED CARE PLANNING  Advanced Care Planning was discussed with patient.  Encouraged the patient to confirm that Living Will and Healthcare Power of  (HCPoA) are accurate and up to date.  Encouraged the patient to confirm that our office be provided a copy of any documentation in the event that anything changes.    Diagnoses and all orders for this visit:  Annual physical exam  -     Follow Up In Primary Care  Counseling regarding advanced care planning and goals of care  Encounter for routine laboratory testing  Encounter for immunization  -     Pneumococcal conjugate  vaccine, 20-valent (PREVNAR 20)  Nonintractable epilepsy without status epilepticus, unspecified epilepsy type (Multi)  -     Follow Up In Primary Care; Future  Age-related osteoporosis without current pathological fracture  -     Follow Up In Primary Care; Future  Type 1 diabetes mellitus with other specified complication  -     Hemoglobin A1C; Future  -     Follow Up In Primary Care; Future  Gastroesophageal reflux disease without esophagitis  -     Follow Up In Primary Care; Future  BPPV (benign paroxysmal positional vertigo), left  -     Follow Up In Primary Care; Future  SVT (supraventricular tachycardia) (CMS-HCC)  -     Follow Up In Primary Care; Future  Primary osteoarthritis involving multiple joints  -     XR knee 1-2 views bilateral; Future  -     XR hips bilateral 2 VW w pelvis when performed; Future  -     XR lumbar spine 2-3 views; Future  -     diclofenac sodium (Voltaren) 1 % gel; Apply 4.5 inches (4 g) topically 3 times a day as needed (pain).  -     acetaminophen (Tylenol Extra Strength) 500 mg tablet; Take 1-2 tablets (500-1,000 mg) by mouth 3 times a day as needed for mild pain (1 - 3), moderate pain (4 - 6) or fever (temp greater than 38.0 C).  -     ibuprofen 200 mg tablet; Take 1-2 tablets (200-400 mg) by mouth 3 times a day as needed for mild pain (1 - 3), moderate pain (4 - 6) or headaches.  Hypothyroidism due to Hashimoto thyroiditis  -     TSH with reflex to Free T4 if abnormal; Future  -     Follow Up In Primary Care; Future  Seizure disorder (Multi)  -     Follow Up In Primary Care; Future  Mixed hyperlipidemia  -     Follow Up In Primary Care; Future  Primary hypertension  -     Comprehensive Metabolic Panel; Future  -     CBC and Auto Differential; Future  -     Follow Up In Primary Care; Future  Vitamin D deficiency  -     Follow Up In Primary Care; Future  -     cholecalciferol (Vitamin D3) 50 mcg (2,000 unit) capsule; Take 1 capsule (50 mcg) by mouth once daily.    Current  Outpatient Medications   Medication Instructions    acetaminophen (TYLENOL EXTRA STRENGTH) 500-1,000 mg, oral, 3 times daily PRN    amLODIPine (NORVASC) 10 mg, oral, Daily    blood sugar diagnostic (Accu-Chek Guide test strips) strip USE TO TEST 10 TIMES DAILY    cholecalciferol (VITAMIN D3) 50 mcg, oral, Daily    diclofenac sodium (VOLTAREN) 4 g, Topical, 3 times daily PRN    ibuprofen 200-400 mg, oral, 3 times daily PRN    insulin aspart, with niacinamide, (Fiasp U-100 Insulin) 100 unit/mL injection UP  UNITS DAILY VIA INSULIN PUMP    levETIRAcetam (KEPPRA) 750 mg, oral, 2 times daily    LevoxyL 137 mcg tablet One tablet once/day for six days/week. Hold on Sundays.    losartan (COZAAR) 100 mg, oral, Daily    multivitamin tablet 1 tablet, oral, Daily    oxybutynin XL (DITROPAN-XL) 10 mg, oral, Daily    pantoprazole (PROTONIX) 40 mg, oral, Daily before breakfast    patient supplied pump INSULIN PUMP- PATIENT SUPPLIED 0-100 Units    simvastatin (ZOCOR) 20 mg, oral, Every evening     Subjective   - The patient otherwise feels well and denies any acute symptoms or concerns at this time.  - The patient denies any changes or progression of their chronic medical problems.  - The patient denies any problems or concerns with their medications.      Review of Systems   Constitutional: Negative.    HENT: Negative.     Eyes: Negative.    Respiratory: Negative.     Cardiovascular: Negative.    Gastrointestinal: Negative.    Endocrine: Negative.    Genitourinary: Negative.    Musculoskeletal: Negative.    Skin: Negative.    Allergic/Immunologic: Negative.    Neurological: Negative.    Hematological: Negative.    Psychiatric/Behavioral: Negative.     All other systems reviewed and are negative.    Objective   Vitals:    11/22/24 1330   BP: 132/80   Pulse: 67   SpO2: 98%      Body mass index is 29.94 kg/m².  Physical Exam  Vitals and nursing note reviewed.   Constitutional:       General: She is not in acute distress.      Appearance: Normal appearance. She is not ill-appearing.   HENT:      Head: Normocephalic and atraumatic.      Right Ear: Tympanic membrane, ear canal and external ear normal. There is no impacted cerumen.      Left Ear: Tympanic membrane, ear canal and external ear normal. There is no impacted cerumen.      Nose: Nose normal.      Mouth/Throat:      Mouth: Mucous membranes are moist.      Pharynx: Oropharynx is clear. No oropharyngeal exudate or posterior oropharyngeal erythema.   Eyes:      General: No scleral icterus.        Right eye: No discharge.         Left eye: No discharge.      Extraocular Movements: Extraocular movements intact.      Conjunctiva/sclera: Conjunctivae normal.      Pupils: Pupils are equal, round, and reactive to light.   Neck:      Vascular: No carotid bruit.   Cardiovascular:      Rate and Rhythm: Normal rate and regular rhythm.      Pulses: Normal pulses.      Heart sounds: Normal heart sounds. No murmur heard.     No friction rub. No gallop.   Pulmonary:      Effort: Pulmonary effort is normal. No respiratory distress.      Breath sounds: Normal breath sounds.   Abdominal:      General: Abdomen is flat. Bowel sounds are normal. There is no distension.      Palpations: Abdomen is soft.      Tenderness: There is no abdominal tenderness.      Hernia: No hernia is present.   Musculoskeletal:         General: No swelling or tenderness. Normal range of motion.   Lymphadenopathy:      Cervical: No cervical adenopathy.   Skin:     General: Skin is warm and dry.      Capillary Refill: Capillary refill takes less than 2 seconds.      Coloration: Skin is not jaundiced.      Findings: No rash.   Neurological:      General: No focal deficit present.      Mental Status: She is alert and oriented to person, place, and time. Mental status is at baseline.   Psychiatric:         Mood and Affect: Mood normal.         Behavior: Behavior normal.       Diagnostic Results   Lab Results   Component Value Date  "   GLUCOSE 197 (H) 11/15/2024    CALCIUM 9.4 11/15/2024     11/15/2024    K 4.5 11/15/2024    CO2 31 11/15/2024     11/15/2024    BUN 20 11/15/2024    CREATININE 0.74 11/15/2024     Lab Results   Component Value Date    ALT 31 11/15/2024    AST 23 11/15/2024    ALKPHOS 80 11/15/2024    BILITOT 1.1 11/15/2024     Lab Results   Component Value Date    WBC 5.1 11/15/2024    HGB 14.5 11/15/2024    HCT 44.4 11/15/2024    MCV 94 11/15/2024     11/15/2024     Lab Results   Component Value Date    CHOL 167 11/15/2024    CHOL 164 01/10/2024    CHOL 140 04/21/2023     Lab Results   Component Value Date    HDL 78.8 11/15/2024    HDL 81.0 01/10/2024    HDL 72 04/21/2023     Lab Results   Component Value Date    LDLCALC 79 11/15/2024    LDLCALC 70 01/10/2024    LDLCALC 52 (L) 04/21/2023     Lab Results   Component Value Date    TRIG 48 11/15/2024    TRIG 65 01/10/2024    TRIG 79 04/21/2023     No components found for: \"CHOLHDL\"  Lab Results   Component Value Date    HGBA1C 7.4 (H) 11/15/2024     Other labs not included in the list above reviewed either before or during this encounter.    History   Past Medical History:   Diagnosis Date    Diabetes mellitus type I (Multi)     Hyperlipidemia     Hypertension     Hypothyroidism     Insulin pump in place     Other specified diabetes mellitus without complications     Diabetes mellitus of other type without complication    Personal history of other diseases of the circulatory system     History of hypertension    Seizure disorder (Multi)      Past Surgical History:   Procedure Laterality Date    BI STEREOTACTIC GUIDED BREAST RIGHT LOCALIZATION AND BIOPSY Right 02/02/2018    BI STEREOTACTIC GUIDED BREAST LOCALIZATION AND BIOPSY RIGHT [facility]    KNEE SURGERY Right 1989    ARTHROSCOPY    OTHER SURGICAL HISTORY  05/25/2021    Craniotomy    TUBAL LIGATION Bilateral 1982     Family History   Problem Relation Name Age of Onset    Diabetes Mother      Heart " disease Mother      Heart disease Father       Social History     Socioeconomic History    Marital status:      Spouse name: Not on file    Number of children: Not on file    Years of education: Not on file    Highest education level: Not on file   Occupational History    Not on file   Tobacco Use    Smoking status: Never     Passive exposure: Never    Smokeless tobacco: Never   Vaping Use    Vaping status: Never Used   Substance and Sexual Activity    Alcohol use: Not Currently    Drug use: Never    Sexual activity: Not Currently     Partners: Male   Other Topics Concern    Not on file   Social History Narrative    Not on file     Social Drivers of Health     Financial Resource Strain: Low Risk  (3/20/2024)    Overall Financial Resource Strain (CARDIA)     Difficulty of Paying Living Expenses: Not hard at all   Food Insecurity: No Food Insecurity (3/20/2024)    Hunger Vital Sign     Worried About Running Out of Food in the Last Year: Never true     Ran Out of Food in the Last Year: Never true   Transportation Needs: No Transportation Needs (3/20/2024)    PRAPARE - Transportation     Lack of Transportation (Medical): No     Lack of Transportation (Non-Medical): No   Physical Activity: Inactive (3/20/2024)    Exercise Vital Sign     Days of Exercise per Week: 0 days     Minutes of Exercise per Session: 0 min   Stress: No Stress Concern Present (3/20/2024)    [employer] [place] [employer] - Occupational Stress Questionnaire     Feeling of Stress : Not at all   Social Connections: Socially Isolated (3/20/2024)    Social Connection and Isolation Panel [NHANES]     Frequency of Communication with Friends and Family: More than three times a week     Frequency of Social Gatherings with Friends and Family: Never     Attends Alevism Services: Never     Active Member of Clubs or Organizations: No     Attends Club or Organization Meetings: Never     Marital Status:    Intimate Partner Violence: Not  At Risk (3/20/2024)    Humiliation, Afraid, Rape, and Kick questionnaire     Fear of Current or Ex-Partner: No     Emotionally Abused: No     Physically Abused: No     Sexually Abused: No   Housing Stability: Low Risk  (3/20/2024)    Housing Stability Vital Sign     Unable to Pay for Housing in the Last Year: No     Number of Places Lived in the Last Year: 1     Unstable Housing in the Last Year: No     Allergies   Allergen Reactions    Sulfa (Sulfonamide Antibiotics) Hives    Sulfamethoxazole-Trimethoprim Hives    Erythromycin Other    Amoxicillin-Pot Clavulanate Diarrhea     Current Outpatient Medications on File Prior to Visit   Medication Sig Dispense Refill    amLODIPine (Norvasc) 10 mg tablet Take 1 tablet (10 mg) by mouth once daily. 90 tablet 3    blood sugar diagnostic (Accu-Chek Guide test strips) strip USE TO TEST 10 TIMES DAILY 900 strip 3    insulin aspart, with niacinamide, (Fiasp U-100 Insulin) 100 unit/mL injection UP  UNITS DAILY VIA INSULIN PUMP 90 mL 3    levETIRAcetam (Keppra) 750 mg tablet Take 1 tablet (750 mg) by mouth 2 times a day.      LevoxyL 137 mcg tablet One tablet once/day for six days/week. Hold on Sundays. 90 tablet 3    losartan (Cozaar) 100 mg tablet Take 1 tablet (100 mg) by mouth once daily. 90 tablet 3    multivitamin tablet Take 1 tablet by mouth once daily.      oxybutynin XL (Ditropan-XL) 10 mg 24 hr tablet Take 1 tablet (10 mg) by mouth once daily.      pantoprazole (ProtoNix) 40 mg EC tablet Take 1 tablet (40 mg) by mouth once daily in the morning. Take before meals.      patient supplied pump INSULIN PUMP- PATIENT SUPPLIED Inject 0-1 each (0-100 Units) under the skin.      simvastatin (Zocor) 20 mg tablet Take 1 tablet (20 mg) by mouth once daily in the evening.       No current facility-administered medications on file prior to visit.     Immunization History   Administered Date(s) Administered    Flu vaccine, quadrivalent, high-dose, preservative free, age 65y+  (FLUZONE) 09/11/2020, 09/28/2020, 10/04/2021, 09/19/2022, 09/29/2023    Flu vaccine, trivalent, preservative free, HIGH-DOSE, age 65y+ (Fluzone) 10/02/2018, 09/06/2024    Flu vaccine, trivalent, preservative free, age 6 months and greater (Fluarix/Fluzone/Flulaval) 10/06/2008    Influenza, injectable, quadrivalent 10/30/2017, 09/26/2019, 09/30/2022    Influenza, seasonal, injectable 10/01/2012, 10/01/2014, 10/15/2014, 09/24/2015    Novel influenza-H1N1-09, preservative-free 11/15/2009    Pfizer COVID-19 vaccine, 12 years and older, (30mcg/0.3mL) (Comirnaty) 12/12/2023, 09/06/2024    Pfizer COVID-19 vaccine, bivalent, age 12 years and older (30 mcg/0.3 mL) 12/19/2022    Pfizer Purple Cap SARS-CoV-2 03/04/2021, 04/01/2021, 11/08/2021    Pneumococcal conjugate vaccine, 13-valent (PREVNAR 13) 11/22/2012    RESPIRATORY SYNCYTIAL VIRUS (RSV), ELIGIBLE PREGNANT PTS, 0.5 ML (ABRYSVO) 09/29/2023    Tdap vaccine, age 7 year and older (BOOSTRIX, ADACEL) 06/11/2016    Zoster, live 12/11/2009     Patient's medical history was reviewed and updated either before or during this encounter.     En Yee MD

## 2024-11-22 NOTE — PATIENT INSTRUCTIONS
- Patient has been dealing with significant bilateral knee pain that is starting to keep her up at night. Patient notes that sleeping on her recliner has helped with this; however, she is now beginning to develop bilateral hip pain.  - Blood pressure at goal today.  - Encouraged continued dietary, exercise, and lifestyle modification.  - Significant medication and problem list reconciliation done today.     Discussed routine and/or preventative care with the patient as outlined below:  - Labwork:   - Patient had labwork done for this appointment. Discussed today.    - Sugars mildly elevated. Will defer management to endocrinology.   - Mild Vitamin D deficiency. Will adjust dosing on OTC supplementation.  - Will order labwork for the patient's next appointment. Encouraged the patient to get this labwork done one week prior to the next appointment.  - Imaging:   - Encouraged the patient to get her annual diabetic retinal exam.  - Patient does not appear to be due for routine imaging at this time.  - Immunizations:   - Encouraged the patient to get the pneumonia (prevnar-20) immunization.  - Encouraged the patient to get their shingles (shingrix) immunizations.    ADVANCED CARE PLANNING  Advanced Care Planning was discussed with patient.  Encouraged the patient to confirm that Living Will and Healthcare Power of  (HCPoA) are accurate and up to date.  Encouraged the patient to confirm that our office be provided a copy of any documentation in the event that anything changes.

## 2024-12-04 ENCOUNTER — TELEPHONE (OUTPATIENT)
Dept: ENDOCRINOLOGY | Facility: CLINIC | Age: 79
End: 2024-12-04
Payer: MEDICARE

## 2024-12-05 ENCOUNTER — APPOINTMENT (OUTPATIENT)
Dept: ENDOCRINOLOGY | Facility: CLINIC | Age: 79
End: 2024-12-05
Payer: MEDICARE

## 2024-12-06 ENCOUNTER — APPOINTMENT (OUTPATIENT)
Dept: ENDOCRINOLOGY | Facility: CLINIC | Age: 79
End: 2024-12-06
Payer: MEDICARE

## 2024-12-13 ENCOUNTER — HOSPITAL ENCOUNTER (OUTPATIENT)
Dept: RADIOLOGY | Facility: HOSPITAL | Age: 79
Discharge: HOME | End: 2024-12-13
Payer: MEDICARE

## 2024-12-13 DIAGNOSIS — M15.0 PRIMARY OSTEOARTHRITIS INVOLVING MULTIPLE JOINTS: ICD-10-CM

## 2024-12-13 PROCEDURE — 72110 X-RAY EXAM L-2 SPINE 4/>VWS: CPT

## 2024-12-13 PROCEDURE — 73522 X-RAY EXAM HIPS BI 3-4 VIEWS: CPT

## 2024-12-13 PROCEDURE — 73560 X-RAY EXAM OF KNEE 1 OR 2: CPT | Mod: 50

## 2024-12-27 ENCOUNTER — TELEPHONE (OUTPATIENT)
Dept: PRIMARY CARE | Facility: CLINIC | Age: 79
End: 2024-12-27
Payer: MEDICARE

## 2025-01-09 ENCOUNTER — TELEPHONE (OUTPATIENT)
Dept: PRIMARY CARE | Facility: CLINIC | Age: 80
End: 2025-01-09
Payer: MEDICARE

## 2025-01-09 DIAGNOSIS — M15.0 PRIMARY OSTEOARTHRITIS INVOLVING MULTIPLE JOINTS: ICD-10-CM

## 2025-01-23 ENCOUNTER — OFFICE VISIT (OUTPATIENT)
Dept: ORTHOPEDIC SURGERY | Facility: CLINIC | Age: 80
End: 2025-01-23
Payer: MEDICARE

## 2025-01-23 VITALS — HEIGHT: 60 IN | WEIGHT: 169 LBS | BODY MASS INDEX: 33.18 KG/M2

## 2025-01-23 DIAGNOSIS — M25.561 BILATERAL CHRONIC KNEE PAIN: Primary | ICD-10-CM

## 2025-01-23 DIAGNOSIS — M17.0 PRIMARY OSTEOARTHRITIS OF KNEES, BILATERAL: ICD-10-CM

## 2025-01-23 DIAGNOSIS — M47.816 LOCALIZED OSTEOARTHRITIS OF LUMBAR SPINE: ICD-10-CM

## 2025-01-23 DIAGNOSIS — M25.562 BILATERAL CHRONIC KNEE PAIN: Primary | ICD-10-CM

## 2025-01-23 DIAGNOSIS — M25.551 BILATERAL HIP PAIN: ICD-10-CM

## 2025-01-23 DIAGNOSIS — G89.29 BILATERAL CHRONIC KNEE PAIN: Primary | ICD-10-CM

## 2025-01-23 DIAGNOSIS — M25.552 BILATERAL HIP PAIN: ICD-10-CM

## 2025-01-23 DIAGNOSIS — M16.0 PRIMARY OSTEOARTHRITIS OF HIPS, BILATERAL: ICD-10-CM

## 2025-01-23 PROCEDURE — 1160F RVW MEDS BY RX/DR IN RCRD: CPT | Performed by: ORTHOPAEDIC SURGERY

## 2025-01-23 PROCEDURE — 1125F AMNT PAIN NOTED PAIN PRSNT: CPT | Performed by: ORTHOPAEDIC SURGERY

## 2025-01-23 PROCEDURE — 99203 OFFICE O/P NEW LOW 30 MIN: CPT | Performed by: ORTHOPAEDIC SURGERY

## 2025-01-23 PROCEDURE — 99213 OFFICE O/P EST LOW 20 MIN: CPT | Performed by: ORTHOPAEDIC SURGERY

## 2025-01-23 PROCEDURE — 1159F MED LIST DOCD IN RCRD: CPT | Performed by: ORTHOPAEDIC SURGERY

## 2025-01-23 PROCEDURE — 1036F TOBACCO NON-USER: CPT | Performed by: ORTHOPAEDIC SURGERY

## 2025-01-23 ASSESSMENT — COLUMBIA-SUICIDE SEVERITY RATING SCALE - C-SSRS
2. HAVE YOU ACTUALLY HAD ANY THOUGHTS OF KILLING YOURSELF?: NO
6. HAVE YOU EVER DONE ANYTHING, STARTED TO DO ANYTHING, OR PREPARED TO DO ANYTHING TO END YOUR LIFE?: NO
1. IN THE PAST MONTH, HAVE YOU WISHED YOU WERE DEAD OR WISHED YOU COULD GO TO SLEEP AND NOT WAKE UP?: NO

## 2025-01-23 ASSESSMENT — LIFESTYLE VARIABLES
AUDIT-C TOTAL SCORE: 0
SKIP TO QUESTIONS 9-10: 1
HOW OFTEN DO YOU HAVE SIX OR MORE DRINKS ON ONE OCCASION: NEVER
HOW OFTEN DO YOU HAVE A DRINK CONTAINING ALCOHOL: NEVER
HOW MANY STANDARD DRINKS CONTAINING ALCOHOL DO YOU HAVE ON A TYPICAL DAY: PATIENT DOES NOT DRINK

## 2025-01-23 ASSESSMENT — PAIN SCALES - GENERAL
PAINLEVEL_OUTOF10: 9
PAINLEVEL_OUTOF10: 7

## 2025-01-23 ASSESSMENT — ENCOUNTER SYMPTOMS
OCCASIONAL FEELINGS OF UNSTEADINESS: 0
DEPRESSION: 0
LOSS OF SENSATION IN FEET: 0

## 2025-01-23 ASSESSMENT — PAIN - FUNCTIONAL ASSESSMENT: PAIN_FUNCTIONAL_ASSESSMENT: 0-10

## 2025-01-23 NOTE — PROGRESS NOTES
Subjective      Chief Complaint   Patient presents with    Right Knee - Pain    Left Knee - Pain    Left Hip - Pain    Right Hip - Pain        Past Surgical History:   Procedure Laterality Date    BI STEREOTACTIC GUIDED BREAST RIGHT LOCALIZATION AND BIOPSY Right 02/02/2018    BI STEREOTACTIC GUIDED BREAST LOCALIZATION AND BIOPSY RIGHT [facility]    KNEE SURGERY Right 1989    ARTHROSCOPY    OTHER SURGICAL HISTORY  05/25/2021    Craniotomy    TUBAL LIGATION Bilateral 1982        Past Medical History:   Diagnosis Date    Diabetes mellitus type I (Multi)     Hyperlipidemia     Hypertension     Hypothyroidism     Insulin pump in place     Other specified diabetes mellitus without complications     Diabetes mellitus of other type without complication    Personal history of other diseases of the circulatory system     History of hypertension    Seizure disorder (Multi)         HPI  This 79 year old patient presents today with bilateral hip pain (9/10) and bilateral knee pain (5/10) The patient states that this bilateral hip pain has been worsening and persistent for months. The knee pain began first and then the hips. X-rays state there is arthritis in both hips and knees. The patient denies trauma or injury to either hip or knees. The patient states that their bilateral hip and knee pain is worse with and aggravated by prolonged walking and standing. The patient states that this bilateral  hip  and kneepain impairs their ability to complete normal activities of daily living. The patient has tried tylenol and ibuprofen with no relief. Did PT for knees in the past due to balance not being good and having vertigo; the PT did not help.     Allergies   Allergen Reactions    Sulfa (Sulfonamide Antibiotics) Hives    Sulfamethoxazole-Trimethoprim Hives    Erythromycin Other    Amoxicillin-Pot Clavulanate Diarrhea      Body mass index is 33.01 kg/m².     ROS  CARDIOLOGY:   Negative for chest pain, shortness of breath.    RESPIRATORY:   Negative for chest pain, shortness of breath.   MUSCULOSKELETAL:   See HPI for details.   NEUROLOGY:   Negative for tingling, numbness, weakness.    Objective      Body mass index is 33.01 kg/m².     Physical Exam  GENERAL:          General Appearance:  This is a pleasant patient with appropriate affect, in no acute distress.   DERMATOLOGY:          Skin: skin at the neck, upper and lower back, and trunk is intact. There is no evidence of skin rash, skin breakdown or ulceration, or atrophic skin change.   EXTREMITIES:          Vascular:  Right, left hands and feet are warm with good color and pulses. Right and left calf and thigh are nontender and nonswollen.   NEUROLOGICAL:          Orientation:  Patient is alert and oriented to person, place, time and situation. Right and left upper and lower extremity motor and sensory examinations are intact.  MUSCULOSKELETAL: Neck: No tenderness. No pain or limitation with range of motion. Back: No tenderness. Straight leg test negative bilaterally. Right  and Left hip: There is not tenderness at the greater trochanter. There is pain with gentle ROM in flexion and extension at the hip. There is pain with external rotation and abduction. bilateral lower extremity is in good position. Nontender at the calf. Neurovascular is intact.  right and left knees: There is pain and crepitus with range of motion.  Melani's appears negative bilaterally.  Anterior drawer and Lachman's tests are negative bilaterally.  No effusion is present in either the right or left knees. The patient is seen walking today walking with a painful gait.    No results found.     X-rays of the right and left hips and right and left knees  And also x-rays of the lumbar spine done on 12- are reviewed with the patient and her  in the office today and show osteoarthritis of the right and left hips and knees and also of the lumbar spine.  Pat was seen today for pain, pain, pain and  pain.  Diagnoses and all orders for this visit:  Bilateral chronic knee pain (Primary)  -     Cancel: Referral to Physical Therapy; Future  -     Referral to Physical Therapy; Future  Bilateral hip pain  -     Cancel: Referral to Physical Therapy; Future  -     Referral to Physical Therapy; Future  Primary osteoarthritis of hips, bilateral  -     Cancel: Referral to Physical Therapy; Future  -     Referral to Physical Therapy; Future  Primary osteoarthritis of knees, bilateral  -     Cancel: Referral to Physical Therapy; Future  -     Referral to Physical Therapy; Future  Localized osteoarthritis of lumbar spine     Options are discussed with the patient in detail. The patient is given a prescription for physical therapy to evaluate and treat with gentle strengthening and ROM exercises with modalities as needed. The patient is instructed regarding activity modification and risk for further injury with falling or trauma, ice, provider directed at home gentle strengthening and ROM exercises, and the appropriate use of Tylenol as needed for pain with its potential adverse reactions and side effects. The patient understands.  she asks if any further options are available.  Cortisone injections to the right and left knees are discussed but the patient states that she has had diabetes and previous cortisone injection significantly raised her blood sugars.  We will first attempt to treat this patient with physical therapy.  If pain persists viscosupplementation injections to the right and left knees will be considered at follow-up visit. Follow up in  6-8 weeks or sooner as needed. Please note that this report has been produced using speech recognition software.  It may contain errors related to grammar, punctuation or spelling.  Electronically signed, but not reviewed.   Popeye Casey MD

## 2025-01-30 ENCOUNTER — EVALUATION (OUTPATIENT)
Dept: PHYSICAL THERAPY | Facility: CLINIC | Age: 80
End: 2025-01-30
Payer: MEDICARE

## 2025-01-30 DIAGNOSIS — M17.0 PRIMARY OSTEOARTHRITIS OF KNEES, BILATERAL: ICD-10-CM

## 2025-01-30 DIAGNOSIS — M16.0 PRIMARY OSTEOARTHRITIS OF HIPS, BILATERAL: ICD-10-CM

## 2025-01-30 DIAGNOSIS — M25.551 BILATERAL HIP PAIN: ICD-10-CM

## 2025-01-30 DIAGNOSIS — M25.552 BILATERAL HIP PAIN: ICD-10-CM

## 2025-01-30 DIAGNOSIS — M25.562 BILATERAL CHRONIC KNEE PAIN: ICD-10-CM

## 2025-01-30 DIAGNOSIS — G89.29 BILATERAL CHRONIC KNEE PAIN: ICD-10-CM

## 2025-01-30 DIAGNOSIS — M54.50 ACUTE BILATERAL LOW BACK PAIN WITHOUT SCIATICA: ICD-10-CM

## 2025-01-30 DIAGNOSIS — M25.561 BILATERAL CHRONIC KNEE PAIN: ICD-10-CM

## 2025-01-30 PROCEDURE — 97161 PT EVAL LOW COMPLEX 20 MIN: CPT | Mod: GP | Performed by: PHYSICAL THERAPIST

## 2025-01-30 PROCEDURE — 97110 THERAPEUTIC EXERCISES: CPT | Mod: GP | Performed by: PHYSICAL THERAPIST

## 2025-01-30 NOTE — PROGRESS NOTES
"Physical Therapy Evaluation    Patient Name: Pat Jaimes  MRN: [id number]  Evaluation Date: 2025  Time Calculation  Start Time: 1445  Stop Time: 1520  Time Calculation (min): 35 min    Current Problem  Problem List Items Addressed This Visit             ICD-10-CM    Bilateral chronic knee pain M25.561, M25.562, G89.29    Relevant Orders    Follow Up In Physical Therapy    Bilateral hip pain M25.551, M25.552    Relevant Orders    Follow Up In Physical Therapy    Primary osteoarthritis of hips, bilateral M16.0    Relevant Orders    Follow Up In Physical Therapy    Primary osteoarthritis of knees, bilateral M17.0    Relevant Orders    Follow Up In Physical Therapy    Acute bilateral low back pain without sciatica M54.50    Relevant Orders    Follow Up In Physical Therapy       2025: MEDICARE A&B, 257 DED, 80% COVERAGE, [place], [employer] ACTIVE SECONDARY    Subjective   General:       Patient reported hx of injury: chronic B knee pain, and then last several months started having upper buttock and LBP, no N/T.  I have to sleep on the recliner now, due to increased back pain.      Red Flags: none    Precautions:   Hx:  DM, HTN, insulin pump  Pain:   0, up to a 9 in LB a time, 0 knees, up to a 5  Home Living:     Pt gets around home safely    Work:  retired    Prior Function Per Pt/Caregiver Report:   Walk > a mile, 2x/day, vacuum due to pain    Pt goals: less pain  Imaging:  IMPRESSION:  Moderate bilateral hip osteoarthritis. No acute abnormality.      Mild spondylosis and facet disease in the lower lumbar spine.  Mild anterolisthesis of L4 on L5    Bilateral knees, three views      There is moderate joint space narrowing with osteophytosis of the  medial compartments bilaterally. There is mild chondrocalcinosis.  There is no effusion. There is no fracture dislocation      IMPRESSION:      Moderate medial compartment osteoarthritis    OBJECTIVE:  If left blank, assume NT:    TU\"  5x STS:     Nasim Lumbar " Assessment:    S/S WORSE: Walking, standing, vacuum, lying  S/S BETTER: Sitting, sleeping in recliner  POSTURE: Slouched  POSTURE CORRECTION: Increased pain  Pt reports their typical sitting surface is a recliner often.  Pt reports they are stiff getting up from chair: Yes at times      Myotomes/MMT's R L   Hip Flexion 4 -/5 right buttock pain 4+/5   Knee Extension 4/5 mild right buttock pain 5/5   Ankle Dorsiflexion 4+/5 5/5   EHL     Ankle Plantarflexion               DTR       NE=No effect, C=centralize, A=abolish, p=peripheralize, P=produce, B=better, W=worse, D=decrease, I=increase, NW=no worse, NB=no better    Lumbar spine/trunk ROM S/S   FIS WFL Decreased pain   EIS Major decrease Increased low back pain   SG L     SG R           S/S During S/S After   Rep FIS     Rep EIS     Rep EIL     Rep ZAY Increase Better   Rep SG L     Rep SG R            Flexion preference      Palpation:   2 test next session  Special Tests:   Negative bilateral straight leg raise  Gait:   Decreased linh and step length, decreased foot clearance    Outcome Measures:    WOMAC= 34 out of 96      OP EDUCATION:   HEP:Access Code: [email or web address]  URL: [email or web address]  Date: 01/30/2025  Prepared by: Heladio Harrison    Exercises  - Supine Posterior Pelvic Tilt  - 1 x daily - 7 x weekly - 2 sets - 10 reps - 5 seconds hold  - Supine Single Knee to Chest Stretch  - 1 x daily - 7 x weekly - 1 sets - 3 reps - 10 seconds hold  - Supine Piriformis Stretch with Foot on Ground  - 1 x daily - 7 x weekly - 1 sets - 2 reps - 10 seconds hold  - Seated Heel Slide  - 2 x daily - 7 x weekly - 2 sets - 10-20 reps  If exercise significantly increases pain, stop    Today's Treatment:  Therapeutic Exercise  PPT 5 seconds x 10, instruction on S YOGI, and piriformis stretch, seated heel slide 2 x 15 each    Assessment       pt is s/p onset of low back pain/posterior hip pain, as well as chronic knee pain, and needs PT to incr ROM, strength, flexibility,  "and decr pain to restore function.      Based on the history including personal factors and/or comorbidities, examination of body systems including body structures and function, activity limitations, and/or participation restrictions, as well as clinical presentation, patient meets criteria for a low complexity evaluation.    Plan     Next session assess response to HEP, test palpation for tenderness as needed.  Skilled PT consisting of:  Aquatics, therapeutic exercise, therapeutic activity, NMR, manual, thermal, electric stimulation, US, light therapy, gait training, transfer training, dry needle.  Mechanical traction PRN and only if OK by PT, canalith repositioning  Rehab Potential: good  Frequency:  2x/wk  Duration:  10 weeks  1- to 4- Medicare cert date:      Goals:    STG:   Pt to be I in initial HEP.    LTG\"s:  Incr MMT of right lower extremity and core by 1 grade for less pain with vacuum.  Improve score on outcome form by 10 points to show incr in function.  Incr ROM of standing trunk extension to moderate decrease for less strain with ADL  Decr max pain level to 3-4 for restful sleep, and walking at least a mile.    Some of This note was created using voice recognition software and was not corrected for typographical or grammatical errors.     "

## 2025-02-06 ENCOUNTER — TELEPHONE (OUTPATIENT)
Dept: ENDOCRINOLOGY | Facility: CLINIC | Age: 80
End: 2025-02-06
Payer: MEDICARE

## 2025-02-06 NOTE — TELEPHONE ENCOUNTER
Pat Jaimes   1945   [id number]   [phone]       Called and spoke to patient in regard to changing appt to 2/7/25 and ok'd per PharmD.

## 2025-02-07 ENCOUNTER — CLINICAL SUPPORT (OUTPATIENT)
Dept: ENDOCRINOLOGY | Facility: CLINIC | Age: 80
End: 2025-02-07
Payer: MEDICARE

## 2025-02-07 VITALS
DIASTOLIC BLOOD PRESSURE: 62 MMHG | BODY MASS INDEX: 33.86 KG/M2 | HEART RATE: 56 BPM | WEIGHT: 173.4 LBS | SYSTOLIC BLOOD PRESSURE: 128 MMHG

## 2025-02-07 DIAGNOSIS — E78.2 MIXED HYPERLIPIDEMIA: ICD-10-CM

## 2025-02-07 DIAGNOSIS — E10.65 TYPE 1 DIABETES MELLITUS WITH HYPERGLYCEMIA (MULTI): Primary | ICD-10-CM

## 2025-02-07 DIAGNOSIS — I10 ESSENTIAL HYPERTENSION: ICD-10-CM

## 2025-02-07 LAB — POC HEMOGLOBIN A1C: 7.1 % (ref 4.2–6.5)

## 2025-02-07 PROCEDURE — 95251 CONT GLUC MNTR ANALYSIS I&R: CPT | Performed by: INTERNAL MEDICINE

## 2025-02-07 PROCEDURE — 99214 OFFICE O/P EST MOD 30 MIN: CPT | Performed by: INTERNAL MEDICINE

## 2025-02-07 PROCEDURE — 83036 HEMOGLOBIN GLYCOSYLATED A1C: CPT | Performed by: INTERNAL MEDICINE

## 2025-02-07 NOTE — PROGRESS NOTES
Attestation signed by Kel Pérez MD on 2/7/25 at 9:39 AM.    I, Dr Kel Pérez, have reviewed this progress note, medication list, vital signs, any pertinent lab values, and any CGM data if present with the [facility] and  face to face during this visit today. This note reflects the treatment plan that was made under my direction after reviewing the above mentioned elements while face to face with the patient and CDE.  I personally answered and addressed any questions and concerns the patient had during the visit today.  The CDE entered the data in this note under my direction and I personally reviewed it, signed any lab or medication orders that I instructed to be completed. I am the billing provider for this visit and the level of service was determined by my involvement in the Medical Decision Making Component of this visit while face to face with the patient.

## 2025-02-07 NOTE — PROGRESS NOTES
HPI     Presents for follow up/metabolic management 78 yo with DM Type 1 diagnosed at age 27. History HTN, HLD, Hypothyroidism, GERD and Seizures.  A1c- 7.4% last visit,  7.1% today.      Pt is testing >4 times/day using guardian cgm.  Following carb controlled diet and knows reasonable carb allowances. Patient able to afford their medications. Patient is exercising/very active.      HTN Losartan, Amlodipine daily     STATIN Simvastatin 20mg (Jan 2024 labs ldl-70)    PUMP:  medtronic 780G with guardian cgm in automode   Basal  0000  0.50  0300  0.70  0600  0.60  2000  0.95  ICR    0000  1:20   0700  1:15  ISF    0000  50  TARGET   0000 120-120    -levemir flexpen U100 sample provided for pump failure, exp 02/28/2026, [id number]      14 days [facility] data reviewed & attached:   74% in target,  1% lows,  patterns: upper 100's overnight/waking, low 200's after breakfast, mid to upper 100's through the day, low 200's after dinner, upper 100's hs   -fear of lows,  treats if/when sugar drops <110.  Often treating with OJ +/- additional carb source (cookie, etc)        Current Outpatient Medications:     acetaminophen (Tylenol Extra Strength) 500 mg tablet, Take 1-2 tablets (500-1,000 mg) by mouth 3 times a day as needed for mild pain (1 - 3), moderate pain (4 - 6) or fever (temp greater than 38.0 C)., Disp: , Rfl:     amLODIPine (Norvasc) 10 mg tablet, Take 1 tablet (10 mg) by mouth once daily., Disp: 90 tablet, Rfl: 3    blood sugar diagnostic (Accu-Chek Guide test strips) strip, USE TO TEST 10 TIMES DAILY, Disp: 900 strip, Rfl: 3    cholecalciferol (Vitamin D3) 50 mcg (2,000 unit) capsule, Take 1 capsule (50 mcg) by mouth once daily., Disp: , Rfl:     diclofenac sodium (Voltaren) 1 % gel, Apply 4.5 inches (4 g) topically 3 times a day as needed (pain)., Disp: , Rfl:     ibuprofen 200 mg tablet, Take 1-2 tablets (200-400 mg) by mouth 3 times a day as needed for mild pain (1 - 3), moderate pain (4 - 6) or headaches., Disp: ,  Rfl:     insulin aspart, with niacinamide, (Fiasp U-100 Insulin) 100 unit/mL injection, UP  UNITS DAILY VIA INSULIN PUMP, Disp: 90 mL, Rfl: 3    levETIRAcetam (Keppra) 750 mg tablet, Take 1 tablet (750 mg) by mouth 2 times a day., Disp: , Rfl:     LevoxyL 137 mcg tablet, One tablet once/day for six days/week. Hold on Sundays., Disp: 90 tablet, Rfl: 3    losartan (Cozaar) 100 mg tablet, Take 1 tablet (100 mg) by mouth once daily., Disp: 90 tablet, Rfl: 3    multivitamin tablet, Take 1 tablet by mouth once daily., Disp: , Rfl:     oxybutynin XL (Ditropan-XL) 10 mg 24 hr tablet, Take 1 tablet (10 mg) by mouth once daily., Disp: , Rfl:     pantoprazole (ProtoNix) 40 mg EC tablet, Take 1 tablet (40 mg) by mouth once daily in the morning. Take before meals., Disp: , Rfl:     patient supplied pump INSULIN PUMP- PATIENT SUPPLIED, Inject 0-1 each (0-100 Units) under the skin., Disp: , Rfl:     simvastatin (Zocor) 20 mg tablet, Take 1 tablet (20 mg) by mouth once daily in the evening., Disp: , Rfl:     Allergies as of 02/07/2025 - Reviewed 02/07/2025   Allergen Reaction Noted    Sulfa (sulfonamide antibiotics) Hives 09/02/2023    Sulfamethoxazole-trimethoprim Hives 09/02/2023    Erythromycin Other 09/02/2023    Amoxicillin-pot clavulanate Diarrhea 09/02/2023       /62   Pulse 56   Wt 78.7 kg (173 lb 6.4 oz)   BMI 33.86 kg/m²     Labs:   Lab Results   Component Value Date    WBC 5.1 11/15/2024    NRBC 0.0 11/15/2024    RBC 4.74 11/15/2024    HGB 14.5 11/15/2024    HCT 44.4 11/15/2024     11/15/2024     Lab Results   Component Value Date    CALCIUM 9.4 11/15/2024    AST 23 11/15/2024    ALKPHOS 80 11/15/2024    BILITOT 1.1 11/15/2024    PROT 6.3 (L) 11/15/2024    ALBUMIN 4.2 11/15/2024    GLOB 2.2 04/21/2023    AGR 1.8 04/21/2023     11/15/2024    K 4.5 11/15/2024     11/15/2024    CO2 31 11/15/2024    ANIONGAP 10 11/15/2024    BUN 20 11/15/2024    CREATININE 0.74 11/15/2024    UREACREAUR 20.0  04/21/2023    GLUCOSE 197 (H) 11/15/2024    ALT 31 11/15/2024    EGFR 83 11/15/2024     Lab Results   Component Value Date    CHOL 167 11/15/2024    TRIG 48 11/15/2024    HDL 78.8 11/15/2024    LDLCALC 79 11/15/2024     Lab Results   Component Value Date    MICROALBCREA  04/30/2024      Comment:      One or more analytes used in this calculation is outside of the analytical measurement range. Calculation cannot be performed.     Lab Results   Component Value Date    TSH 1.75 11/15/2024     Lab Results   Component Value Date    VSMYSPNY56 404 01/09/2023     Lab Results   Component Value Date    HGBA1C 7.1 (A) 02/07/2025         Assessment/Plan   1. Type 1 diabetes mellitus with hyperglycemia (Multi) (Primary)    -A1c ordered & reviewed  -guardian/medtronic data reviewed   -labs reviewed     -some hyperglycemia after breakfast/dinner, overall doing well, no changes to pump settings     -again spent a lot of time discussing AID algorithm,  best practices/recs for success etc.   -contact pump rep, Yariel regarding pump battery life     2. Mixed hyperlipidemia  -on statin, tolerating, reviewed labs, no change    3. Essential hypertension  -at target on therapy after resting       Follow up: 3 & 6 mon    -labs/tests/notes reviewed  -reviewed and counseled patient on medication monitoring and side effects    Treatment and plan discussed with Dr. Pérez.  JILL Gibson, PharmD, BC-[facility], [facility].     Medical Decision Making  Complexity of problem: Chronic illness of diabetes mellitus uncontrolled, progressing  Data analyzed and reviewed: Reviewed prior notes, blood glucose data, labs including HgbA1c, lipids, serum chemistries.  Ordered tests.   Risk of complications and morbidities: Is definite because of use of insulin and risk of hypoglycemia.  Prescription medications reviewed and modifications made.  Compliance assessed.  Addressed social determinants of health including food insecurity.

## 2025-02-12 ENCOUNTER — APPOINTMENT (OUTPATIENT)
Dept: ENDOCRINOLOGY | Facility: CLINIC | Age: 80
End: 2025-02-12
Payer: MEDICARE

## 2025-02-18 ENCOUNTER — TREATMENT (OUTPATIENT)
Dept: PHYSICAL THERAPY | Facility: CLINIC | Age: 80
End: 2025-02-18
Payer: MEDICARE

## 2025-02-18 DIAGNOSIS — M25.561 BILATERAL CHRONIC KNEE PAIN: ICD-10-CM

## 2025-02-18 DIAGNOSIS — G89.29 BILATERAL CHRONIC KNEE PAIN: ICD-10-CM

## 2025-02-18 DIAGNOSIS — M17.0 PRIMARY OSTEOARTHRITIS OF KNEES, BILATERAL: ICD-10-CM

## 2025-02-18 DIAGNOSIS — M25.552 BILATERAL HIP PAIN: ICD-10-CM

## 2025-02-18 DIAGNOSIS — M54.50 ACUTE BILATERAL LOW BACK PAIN WITHOUT SCIATICA: ICD-10-CM

## 2025-02-18 DIAGNOSIS — M25.551 BILATERAL HIP PAIN: ICD-10-CM

## 2025-02-18 DIAGNOSIS — M25.562 BILATERAL CHRONIC KNEE PAIN: ICD-10-CM

## 2025-02-18 DIAGNOSIS — M16.0 PRIMARY OSTEOARTHRITIS OF HIPS, BILATERAL: ICD-10-CM

## 2025-02-18 PROCEDURE — 97110 THERAPEUTIC EXERCISES: CPT | Mod: GP | Performed by: PHYSICAL THERAPIST

## 2025-02-18 PROCEDURE — 97140 MANUAL THERAPY 1/> REGIONS: CPT | Mod: GP | Performed by: PHYSICAL THERAPIST

## 2025-02-18 NOTE — PROGRESS NOTES
"Physical Therapy Treatment    Patient Name: Pat Jaimes  MRN: [id number]  Encounter date:  2/18/2025  Time Calculation  Start Time: 1152  Stop Time: 1230  Time Calculation (min): 38 min     PT Therapeutic Procedures Time Entry  Manual Therapy Time Entry: 18  Therapeutic Exercise Time Entry: 20    Visit Number:  2 (including evaluation)  Planned total visits: 20  Visit Authorized:  01/29/2025: MEDICARE A&B, 257 DED, 80% COVERAGE, [place], [employer] ACTIVE SECONDARY       Current Problem  Problem List Items Addressed This Visit             ICD-10-CM    Bilateral chronic knee pain M25.561, M25.562, G89.29    Bilateral hip pain M25.551, M25.552    Primary osteoarthritis of hips, bilateral M16.0    Primary osteoarthritis of knees, bilateral M17.0    Acute bilateral low back pain without sciatica M54.50       Precautions:   Hx:  DM, HTN, insulin pump    Pain:  1 (per eval up to a 9 in LB a time, 0 knees, up to a 5)  Location R buttock  Description ache    Subjective  General        No progress as yet, but is doing HEP 2x/day.  Walking up to three quarters of a mile but has to stop to rest due to significant pain increase right buttock.    Objective:  VERY TTP right much greater than left piriformis, and mild TTP right greater trochanter region.      Treatment:     Therapeutic Exercise to improve strength, flexibility, and decreased pain:    PPT 5 seconds x 10  Tra 5\" 2 x 10  SKC DNP  piriformis stretch x 30 seconds  seated heel slide 2 x 15 dnp  LAQ 2 X 10  STS X 10  Cold pack during some exercise and exercise instruction to decrease pain right piriformis region, x 10 minutes no charge    Manual:  Cross friction massage to right piriformis to decrease tension and pain  Rolling stick to right gluteal muscles to decrease tension    Activity tolerance:   Fair for walking    OP EDUCATION:    Continue HEP:, add TrA 5\" 2 x 10, and ice right gluteal musculature/piriformis 2 times a day for 20 minutes as tolerated    HEP:Access Code: " [email or web address]  URL: [email or web address]  Date: 01/30/2025  Prepared by: Heladio Wasconnie     Exercises  - Supine Posterior Pelvic Tilt  - 1 x daily - 7 x weekly - 2 sets - 10 reps - 5 seconds hold  - Supine Single Knee to Chest Stretch  - 1 x daily - 7 x weekly - 1 sets - 3 reps - 10 seconds hold  - Supine Piriformis Stretch with Foot on Ground  - 1 x daily - 7 x weekly - 1 sets - 2 reps - 10 seconds hold  - Seated Heel Slide  - 2 x daily - 7 x weekly - 2 sets - 10-20 reps  If exercise significantly increases pain, stop    Assessment:     Pt's response to treatment: Patient needed verbal cues to perform transverse abdominis correctly without performing pelvic tilt at the same time, with fair carryover.  Areas of improvements: HEP knowledge  Limitations/deficits: Pain, weakness, flexibility, pain with walking and vacuum    Pain end of session:  1    Plan:     Continued PT required to reach all goals and incr ROM strength decr pain     Next session assess response to today's therapy session and use of cold back at home.  Ensure proper use of transversus abdominis contractions, at rest and during activity.  Review vacuuming technique.    Skilled PT consisting of:  Aquatics, therapeutic exercise, therapeutic activity, NMR, manual, thermal, electric stimulation, US, light therapy, gait training, transfer training, dry needle.  Mechanical traction PRN and only if OK by PT, canalith repositioning  Rehab Potential: good  Frequency:  2x/wk  Duration:  10 weeks  1- to 4- Medicare cert date:       Goals:     STG:   Pt to be I in initial HEP.     LTG\"s:  Incr MMT of right lower extremity and core by 1 grade for less pain with vacuum.  Improve score on outcome form by 10 points to show incr in function.  Incr ROM of standing trunk extension to moderate decrease for less strain with ADL  Decr max pain level to 3-4 for restful sleep, and walking at least a mile.     Some of This note was created using voice " recognition software and was not corrected for typographical or grammatical errors.

## 2025-02-21 ENCOUNTER — TREATMENT (OUTPATIENT)
Dept: PHYSICAL THERAPY | Facility: CLINIC | Age: 80
End: 2025-02-21
Payer: MEDICARE

## 2025-02-21 DIAGNOSIS — M16.0 PRIMARY OSTEOARTHRITIS OF HIPS, BILATERAL: ICD-10-CM

## 2025-02-21 DIAGNOSIS — M25.562 BILATERAL CHRONIC KNEE PAIN: ICD-10-CM

## 2025-02-21 DIAGNOSIS — M54.50 ACUTE BILATERAL LOW BACK PAIN WITHOUT SCIATICA: ICD-10-CM

## 2025-02-21 DIAGNOSIS — M25.552 BILATERAL HIP PAIN: ICD-10-CM

## 2025-02-21 DIAGNOSIS — M17.0 PRIMARY OSTEOARTHRITIS OF KNEES, BILATERAL: ICD-10-CM

## 2025-02-21 DIAGNOSIS — M25.561 BILATERAL CHRONIC KNEE PAIN: ICD-10-CM

## 2025-02-21 DIAGNOSIS — G89.29 BILATERAL CHRONIC KNEE PAIN: ICD-10-CM

## 2025-02-21 DIAGNOSIS — M25.551 BILATERAL HIP PAIN: ICD-10-CM

## 2025-02-21 PROCEDURE — 97140 MANUAL THERAPY 1/> REGIONS: CPT | Mod: GP | Performed by: PHYSICAL THERAPIST

## 2025-02-21 PROCEDURE — 97110 THERAPEUTIC EXERCISES: CPT | Mod: GP | Performed by: PHYSICAL THERAPIST

## 2025-02-21 NOTE — PROGRESS NOTES
"Physical Therapy Treatment    Patient Name: Pat Jaimes  MRN: [id number]  Encounter date:  2/21/2025  Time Calculation  Start Time: 1115  Stop Time: 1155  Time Calculation (min): 40 min     PT Therapeutic Procedures Time Entry  Manual Therapy Time Entry: 10  Therapeutic Exercise Time Entry: 28    Visit Number:  3 (including evaluation)  Planned total visits: 20  Visit Authorized:  01/29/2025: MEDICARE A&B, 257 DED, 80% [place], [place], [employer]       Current Problem  Problem List Items Addressed This Visit             ICD-10-CM    Bilateral chronic knee pain M25.561, M25.562, G89.29    Bilateral hip pain M25.551, M25.552    Primary osteoarthritis of hips, bilateral M16.0    Primary osteoarthritis of knees, bilateral M17.0    Acute bilateral low back pain without sciatica M54.50       Precautions:   Hx:  DM, HTN, insulin pump    Pain:  1 -only up to a 5 since last session  Location R buttock  Description ache    Subjective  General        Feels good doing HEP, using cold pack twice a day with some relief.    Objective:  Min to mod decrease in bilateral range of motion with left hip tighter than right hip in most areas.  Min decrease in flexibility bilateral hip flexors and quads  Decreased joint mobility noted right hip, left hip not tested today    Prior:  VERY TTP right much greater than left piriformis, and mild TTP right greater trochanter region.      Treatment:     Therapeutic Exercise to improve strength, flexibility, and decreased pain:    NuStep seat #7 upper extremities at #9, level 3 5 minutes    PPT 5 seconds x 10  Tra 5\" 2 x 10  SKC 5\" x 10  piriformis stretch 2 x 30 seconds, and figure 4 x 30\" ea  seated heel slide 2 x 15 dnp  LAQ 2 X 10  STS 5\" X 10 ea  Iso hip add 3\" x 13  Right hip flexor and quad stretch x 45 seconds each in supine    Manual:  Cross friction massage to right piriformis to decrease tension and pain  Rolling stick to right gluteal muscles to decrease tension  Long-leg " "right lower extremity distraction mobs grade 3 to increase mobility    Light therapy: Infra and red light  Applied to: Right piriformis region  Patient position:  Left side lying  Comment:  Pt educated on light therapy and agrees to have this administered.      Time: 2 minutes, no charge    Activity tolerance:   Fair for walking    OP EDUCATION:  Reviewed vacuuming technique and TrA use with this and lifting, bed mobility etc.    Continue HEP:  Prior:   add TrA 5\" 2 x 10, and ice right gluteal musculature/piriformis 2 times a day for 20 minutes as tolerated    HEP:Access Code: [email or web address]  URL: [email or web address]  Date: 01/30/2025  Prepared by: Heladio Harrison     Exercises  - Supine Posterior Pelvic Tilt  - 1 x daily - 7 x weekly - 2 sets - 10 reps - 5 seconds hold  - Supine Single Knee to Chest Stretch  - 1 x daily - 7 x weekly - 1 sets - 3 reps - 10 seconds hold  - Supine Piriformis Stretch with Foot on Ground  - 1 x daily - 7 x weekly - 1 sets - 2 reps - 10 seconds hold  - Seated Heel Slide  - 2 x daily - 7 x weekly - 2 sets - 10-20 reps  If exercise significantly increases pain, stop    Assessment:     Pt's response to treatment: Patient needed verbal cues to perform transverse abdominis correctly without performing pelvic tilt at the same time, with fair carryover.  Areas of improvements: HEP knowledge  Limitations/deficits: Pain, weakness, flexibility, pain with walking and vacuum    Pain end of session:  1    Plan:     Continued PT required to reach all goals and incr ROM strength decr pain     Next session continue therapeutic exercise and manual techniques like therapy as appropriate, focus on stretching and issues stretching HEP.    Skilled PT consisting of:  Aquatics, therapeutic exercise, therapeutic activity, NMR, manual, thermal, electric stimulation, US, light therapy, gait training, transfer training, dry needle.  Mechanical traction PRN and only if OK by PT, canalith repositioning  Rehab " "Potential: good  Frequency:  2x/wk  Duration:  10 weeks  1- to 4- Medicare cert date:       Goals:     STG:   Pt to be I in initial HEP.     LTG\"s:  Incr MMT of right lower extremity and core by 1 grade for less pain with vacuum.  Improve score on outcome form by 10 points to show incr in function.  Incr ROM of standing trunk extension to moderate decrease for less strain with ADL  Decr max pain level to 3-4 for restful sleep, and walking at least a mile.     Some of This note was created using voice recognition software and was not corrected for typographical or grammatical errors.            "

## 2025-02-25 ENCOUNTER — APPOINTMENT (OUTPATIENT)
Dept: PHYSICAL THERAPY | Facility: CLINIC | Age: 80
End: 2025-02-25
Payer: MEDICARE

## 2025-02-25 DIAGNOSIS — E10.8 DIABETES MELLITUS TYPE 1 WITH MANIFESTATIONS (MULTI): ICD-10-CM

## 2025-02-25 RX ORDER — BLOOD SUGAR DIAGNOSTIC
STRIP MISCELLANEOUS
Qty: 400 EACH | Refills: 3 | Status: SHIPPED | OUTPATIENT
Start: 2025-02-25

## 2025-02-28 NOTE — ASSESSMENT & PLAN NOTE
Dr. Yee checked lipid panel in November: Total cholesterol 167, triglycerides 48, HDL 78 and LDL 79.

## 2025-03-04 ENCOUNTER — TREATMENT (OUTPATIENT)
Dept: PHYSICAL THERAPY | Facility: CLINIC | Age: 80
End: 2025-03-04
Payer: MEDICARE

## 2025-03-04 DIAGNOSIS — M17.0 PRIMARY OSTEOARTHRITIS OF KNEES, BILATERAL: ICD-10-CM

## 2025-03-04 DIAGNOSIS — M25.551 BILATERAL HIP PAIN: ICD-10-CM

## 2025-03-04 DIAGNOSIS — M54.50 ACUTE BILATERAL LOW BACK PAIN WITHOUT SCIATICA: ICD-10-CM

## 2025-03-04 DIAGNOSIS — M25.561 BILATERAL CHRONIC KNEE PAIN: ICD-10-CM

## 2025-03-04 DIAGNOSIS — M16.0 PRIMARY OSTEOARTHRITIS OF HIPS, BILATERAL: ICD-10-CM

## 2025-03-04 DIAGNOSIS — M25.552 BILATERAL HIP PAIN: ICD-10-CM

## 2025-03-04 DIAGNOSIS — G89.29 BILATERAL CHRONIC KNEE PAIN: ICD-10-CM

## 2025-03-04 DIAGNOSIS — M25.562 BILATERAL CHRONIC KNEE PAIN: ICD-10-CM

## 2025-03-04 PROCEDURE — 97110 THERAPEUTIC EXERCISES: CPT | Mod: GP | Performed by: PHYSICAL THERAPIST

## 2025-03-04 NOTE — PROGRESS NOTES
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  Sore after last session.      Objective:  Min to mod decrease in bilateral range of motion with left hip tighter than right hip in most areas.  Min decrease in flexibility bilateral hip flexors and quads  Decreased joint mobility noted right hip, left hip not tested today    Prior:  VERY TTP right much greater than left piriformis, and mild TTP right greater trochanter region.      Treatment:     Therapeutic Exercise to improve strength, flexibility, and decreased pain:    NuStep seat #7 upper extremities at #9, level 3 6 minutes    Standing, with TrA:  3 way hip 2 x 10  Knee flexion 2 x 10  HR/TR 2 x 10  March 2'  Step ups 6.5 x 10 ea    Seated:  STS x 10 with TrA  Hs stretch LLLD 2 x1' ea    Seated on fit disc, with TrA:  EO 1' x 2  EC 1' x 2  B sh flexion with rainbow ball x 10  [place] ball push outs x 10    DNP the below  PPT 5 seconds x 10  Tra 5\" 2 x 10  SKC 5\" x 10  piriformis stretch 2 x 30 seconds, and figure 4 x 30\" ea  seated heel slide 2 x 15 dnp  LAQ 2 X 10  Iso hip add 3\" x 13  Right hip flexor and quad stretch x " "45 seconds each in supine    Manual: DNP  Cross friction massage to right piriformis to decrease tension and pain  Rolling stick to right gluteal muscles to decrease tension  Long-leg right lower extremity distraction mobs grade 3 to increase mobility    Light therapy: Infra and red light DNP  Applied to: Right piriformis region  Patient position:  Left side lying  Comment:  Pt educated on light therapy and agrees to have this administered.      Time: 0    Activity tolerance:   Fair for walking    OP EDUCATION:  Use a rollator on walks to decr pain    Prior:  Reviewed vacuuming technique and TrA use with this and lifting, bed mobility etc.    Continue HEP:  Prior:   add TrA 5\" 2 x 10, and ice right gluteal musculature/piriformis 2 times a day for 20 minutes as tolerated    HEP:Access Code: [email or web address]  URL: [email or web address]  Date: 01/30/2025  Prepared by: Heladio Harrison     Exercises  - Supine Posterior Pelvic Tilt  - 1 x daily - 7 x weekly - 2 sets - 10 reps - 5 seconds hold  - Supine Single Knee to Chest Stretch  - 1 x daily - 7 x weekly - 1 sets - 3 reps - 10 seconds hold  - Supine Piriformis Stretch with Foot on Ground  - 1 x daily - 7 x weekly - 1 sets - 2 reps - 10 seconds hold  - Seated Heel Slide  - 2 x daily - 7 x weekly - 2 sets - 10-20 reps  If exercise significantly increases pain, stop    Assessment:     Pt's response to treatment: no pain with any tx  Areas of improvements: HEP knowledge  Limitations/deficits: Pain, weakness, flexibility, pain with walking and vacuum    Pain end of session:  0    Plan:     Continued PT required to reach all goals and incr ROM strength decr pain     Next session continue therapeutic exercise and manual techniques as appropriate, focus on stretching and issue stretching/balance with TrA HEP.    Skilled PT consisting of:  Aquatics, therapeutic exercise, therapeutic activity, NMR, manual, thermal, electric stimulation, US, light therapy, gait training, transfer " "training, dry needle.  Mechanical traction PRN and only if OK by PT, canalith repositioning  Rehab Potential: good  Frequency:  2x/wk  Duration:  10 weeks  1- to 4- Medicare cert date:       Goals:     STG:   Pt to be I in initial HEP.     LTG\"s:  Incr MMT of right lower extremity and core by 1 grade for less pain with vacuum.  Improve score on outcome form by 10 points to show incr in function.  Incr ROM of standing trunk extension to moderate decrease for less strain with ADL  Decr max pain level to 3-4 for restful sleep, and walking at least a mile.     Some of This note was created using voice recognition software and was not corrected for typographical or grammatical errors.            "

## 2025-03-07 ENCOUNTER — TREATMENT (OUTPATIENT)
Dept: PHYSICAL THERAPY | Facility: CLINIC | Age: 80
End: 2025-03-07
Payer: MEDICARE

## 2025-03-07 DIAGNOSIS — M25.562 BILATERAL CHRONIC KNEE PAIN: ICD-10-CM

## 2025-03-07 DIAGNOSIS — M54.50 ACUTE BILATERAL LOW BACK PAIN WITHOUT SCIATICA: ICD-10-CM

## 2025-03-07 DIAGNOSIS — M17.0 PRIMARY OSTEOARTHRITIS OF KNEES, BILATERAL: ICD-10-CM

## 2025-03-07 DIAGNOSIS — M25.561 BILATERAL CHRONIC KNEE PAIN: ICD-10-CM

## 2025-03-07 DIAGNOSIS — G89.29 BILATERAL CHRONIC KNEE PAIN: ICD-10-CM

## 2025-03-07 DIAGNOSIS — M25.552 BILATERAL HIP PAIN: ICD-10-CM

## 2025-03-07 DIAGNOSIS — M16.0 PRIMARY OSTEOARTHRITIS OF HIPS, BILATERAL: ICD-10-CM

## 2025-03-07 DIAGNOSIS — M25.551 BILATERAL HIP PAIN: ICD-10-CM

## 2025-03-07 PROCEDURE — 97110 THERAPEUTIC EXERCISES: CPT | Mod: GP | Performed by: PHYSICAL THERAPIST

## 2025-03-07 NOTE — PROGRESS NOTES
"Physical Therapy Treatment    Patient Name: Pat Jaimes  MRN: [id number]  Encounter date:  3/7/2025  Time Calculation  Start Time: 1113  Stop Time: 1151  Time Calculation (min): 38 min     PT Therapeutic Procedures Time Entry  Therapeutic Exercise Time Entry: 38    Visit Number:  5 (including evaluation)  Planned total visits: 20  Visit Authorized:  01/29/2025: MEDICARE A&B, 257 DED, 80% [place], [employer]       Current Problem  Problem List Items Addressed This Visit             ICD-10-CM    Bilateral chronic knee pain M25.561, M25.562, G89.29    Bilateral hip pain M25.551, M25.552    Primary osteoarthritis of hips, 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  Sore after last session.      Objective:  6-minute walk test: Patient achieved 941 feet in 6 minutes with wheeled walker with only 1 out of 10 right buttock pain    Prior:  Min to mod decrease in bilateral range of motion with left hip tighter than right hip in most areas.  Min decrease in flexibility bilateral hip flexors and quads  Decreased joint mobility noted right hip, left hip not tested today    Prior:  VERY TTP right much greater than left piriformis, and mild TTP right greater trochanter region.      Treatment:     Therapeutic Exercise to improve strength, flexibility, and decreased pain:    6-minute walk with rollator to improve activity tolerance  PPT 5 seconds x 10  Tra 5\" 2 x 10  piriformis stretch 3 x 30 seconds, and figure 3 x 30\" ea  Bent knee fallouts 5 seconds x 10 each  Butterfly stretch 10 x 10 seconds    Side-lying:  Clamshell and reverse clam 2 x 10 each    NuStep seat #7 upper extremities at #9, level 3 6 minutes " "DNP    Seated:  Forward flexion stretch 5\" x 10  STS x 10 with TrA DNP  Hs stretch LLLD 2 x1' ea DNP    DNP the below:  LAQ 2 X 10  Iso hip add 3\" x 10  Right hip flexor and quad stretch x 45 seconds each in supine     Seated on fit disc, with TrA:  EO 1' x 2  EC 1' x 2  B sh flexion with rainbow ball x 10  [place] ball push outs x 10    Standing, with TrA:  3 way hip 2 x 10  Knee flexion 2 x 10  HR/TR 2 x 10  March 2'  Step ups 6.5 x 10 ea    Manual: DNP  Cross friction massage to right piriformis to decrease tension and pain  Rolling stick to right gluteal muscles to decrease tension  Long-leg right lower extremity distraction mobs grade 3 to increase mobility    Light therapy: Infra and red light DNP  Applied to: Right piriformis region  Patient position:  Left side lying  Comment:  Pt educated on light therapy and agrees to have this administered.      Time: 0    Activity tolerance:   Fair for walking    OP EDUCATION:  Access Code: [email or web address]  URL: [email or web address]  Date: 03/07/2025  Prepared by: Heladio Harrison    Exercises  - Seated Flexion Stretch  - 2 x daily - 7 x weekly - 2 sets - 10 reps - 5 seconds hold  - Supine Figure 4 Piriformis Stretch  - 2 x daily - 7 x weekly - 1 sets - 3 reps - 20 seconds hold  - Modified Benja Stretch  - 2 x daily - 7 x weekly - 1 sets - 3 reps - 20 seconds hold  - Clam  - 1 x daily - 7 x weekly - 2 sets - 10 reps  - Sidelying Reverse Clamshell  - 1 x daily - 7 x weekly - 2 sets - 10 reps    Prior:  Use a rollator on walks to decr pain    Prior:  Reviewed vacuuming technique and TrA use with this and lifting, bed mobility etc.    Continue HEP:  Prior:   add TrA 5\" 2 x 10, and ice right gluteal musculature/piriformis 2 times a day for 20 minutes as tolerated    HEP:Access Code: [email or web address]  URL: [email or web address]  Date: 01/30/2025  Prepared by: Heladio Harrison     Exercises  - Supine Posterior Pelvic Tilt  - 1 x daily - 7 x weekly - 2 sets - 10 reps - 5 seconds " "hold  - Supine Single Knee to Chest Stretch  - 1 x daily - 7 x weekly - 1 sets - 3 reps - 10 seconds hold  - Supine Piriformis Stretch with Foot on Ground  - 1 x daily - 7 x weekly - 1 sets - 2 reps - 10 seconds hold  - Seated Heel Slide  - 2 x daily - 7 x weekly - 2 sets - 10-20 reps  If exercise significantly increases pain, stop    Assessment:     Pt's response to treatment: no pain with any tx  Areas of improvements: HEP knowledge  Limitations/deficits: Pain, weakness, flexibility, pain with walking and vacuum    Pain end of session:  0    Plan:     Continued PT required to reach all goals and incr ROM strength decr pain     Next session consider active release or IASTM of right piriformis.  Continue to stretch tight hip musculature.  Have patient bring in HEP after next session to consolidate HEP as needed.    Skilled PT consisting of:  Aquatics, therapeutic exercise, therapeutic activity, NMR, manual, thermal, electric stimulation, US, light therapy, gait training, transfer training, dry needle.  Mechanical traction PRN and only if OK by PT, canalith repositioning  Rehab Potential: good  Frequency:  2x/wk  Duration:  10 weeks  1- to 4- Medicare cert date:       Goals:     STG:   Pt to be I in initial HEP.     LTG\"s:  Incr MMT of right lower extremity and core by 1 grade for less pain with vacuum.  Improve score on outcome form by 10 points to show incr in function.  Incr ROM of standing trunk extension to moderate decrease for less strain with ADL  Decr max pain level to 3-4 for restful sleep, and walking at least a mile.     Some of This note was created using voice recognition software and was not corrected for typographical or grammatical errors.            "

## 2025-03-10 ENCOUNTER — OFFICE VISIT (OUTPATIENT)
Facility: CLINIC | Age: 80
End: 2025-03-10
Payer: MEDICARE

## 2025-03-10 DIAGNOSIS — E78.2 MIXED HYPERLIPIDEMIA: ICD-10-CM

## 2025-03-10 DIAGNOSIS — I35.1 NONRHEUMATIC AORTIC VALVE INSUFFICIENCY: ICD-10-CM

## 2025-03-10 DIAGNOSIS — I47.10 SVT (SUPRAVENTRICULAR TACHYCARDIA) (CMS-HCC): Primary | ICD-10-CM

## 2025-03-10 DIAGNOSIS — I10 PRIMARY HYPERTENSION: ICD-10-CM

## 2025-03-11 ENCOUNTER — TREATMENT (OUTPATIENT)
Dept: PHYSICAL THERAPY | Facility: CLINIC | Age: 80
End: 2025-03-11
Payer: MEDICARE

## 2025-03-11 DIAGNOSIS — M25.561 BILATERAL CHRONIC KNEE PAIN: ICD-10-CM

## 2025-03-11 DIAGNOSIS — M54.50 ACUTE BILATERAL LOW BACK PAIN WITHOUT SCIATICA: ICD-10-CM

## 2025-03-11 DIAGNOSIS — M25.562 BILATERAL CHRONIC KNEE PAIN: ICD-10-CM

## 2025-03-11 DIAGNOSIS — M25.551 BILATERAL HIP PAIN: ICD-10-CM

## 2025-03-11 DIAGNOSIS — G89.29 BILATERAL CHRONIC KNEE PAIN: ICD-10-CM

## 2025-03-11 DIAGNOSIS — M16.0 PRIMARY OSTEOARTHRITIS OF HIPS, BILATERAL: ICD-10-CM

## 2025-03-11 DIAGNOSIS — M17.0 PRIMARY OSTEOARTHRITIS OF KNEES, BILATERAL: ICD-10-CM

## 2025-03-11 DIAGNOSIS — M25.552 BILATERAL HIP PAIN: ICD-10-CM

## 2025-03-11 PROCEDURE — 97110 THERAPEUTIC EXERCISES: CPT | Mod: GP | Performed by: PHYSICAL THERAPIST

## 2025-03-11 PROCEDURE — 97140 MANUAL THERAPY 1/> REGIONS: CPT | Mod: GP | Performed by: PHYSICAL THERAPIST

## 2025-03-11 NOTE — PROGRESS NOTES
"Physical Therapy Treatment    Patient Name: Pat Jaimes  MRN: [id number]  Encounter date:  3/11/2025             Visit Number:  6 (including evaluation)  Planned total visits: 20  Visit Authorized:  01/29/2025: MEDICARE A&B, 257 DED, 80% [place], [employer]       Current Problem  Problem List Items Addressed This Visit             ICD-10-CM    Bilateral chronic knee pain M25.561, M25.562, G89.29    Bilateral hip pain M25.551, M25.552    Primary osteoarthritis of hips, 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  Sore after last session.      Objective:  + long sit test, + L squish test, + L LE apparent longer than L, + L ASIS low    Prior:  6-minute walk test: Patient achieved 941 feet in 6 minutes with wheeled walker with only 1 out of 10 right buttock pain    Prior:  Min to mod decrease in bilateral range of motion with left hip tighter than right hip in most areas.  Min decrease in flexibility bilateral hip flexors and quads  Decreased joint mobility noted right hip, left hip not tested today    Prior:  VERY TTP right much greater than left piriformis, and mild TTP right greater trochanter region.      Treatment:     Therapeutic Exercise to improve strength, flexibility, and decreased pain:  NuStep seat #7 upper extremities at #9, level 4  6 minutes   Bridge 5\" x 12  Pt ed on side step with T-band around thighs-issued band   Manual:  Active release to R piriformis  MET to correct pelvic asymmetries      DNP any of the below tx:  6-minute walk with rollator to improve activity tolerance  PPT 5 seconds x 10  Tra 5\" 2 x 10  piriformis stretch 3 x 30 seconds, and figure 3 x 30\" ea  Bent knee fallouts 5 seconds x 10 " "each  Butterfly stretch 10 x 10 seconds    Side-lying:  Clamshell and reverse clam 2 x 10 each    NuStep seat #7 upper extremities at #9, level 3 6 minutes     Seated:  Forward flexion stretch 5\" x 10  STS x 10 with TrA DNP  Hs stretch LLLD 2 x1' ea DNP    DNP the below:  LAQ 2 X 10  Iso hip add 3\" x 10  Right hip flexor and quad stretch x 45 seconds each in supine     Seated on fit disc, with TrA:  EO 1' x 2  EC 1' x 2  B sh flexion with rainbow ball x 10  [place] ball push outs x 10    Standing, with TrA:  3 way hip 2 x 10  Knee flexion 2 x 10  HR/TR 2 x 10  March 2'  Step ups 6.5 x 10 ea    Manual: DNP  Cross friction massage to right piriformis to decrease tension and pain  Rolling stick to right gluteal muscles to decrease tension  Long-leg right lower extremity distraction mobs grade 3 to increase mobility    Light therapy: Infra and red light DNP  Applied to: Right piriformis region  Patient position:  Left side lying  Comment:  Pt educated on light therapy and agrees to have this administered.      Time: 0    Activity tolerance:   Fair for walking    OP EDUCATION:    Add bridge and s/s with O TB  Prior:  Access Code: [email or web address]  URL: [email or web address]  Date: 03/07/2025  Prepared by: Heladio Harrison    Exercises  - Seated Flexion Stretch  - 2 x daily - 7 x weekly - 2 sets - 10 reps - 5 seconds hold  - Supine Figure 4 Piriformis Stretch  - 2 x daily - 7 x weekly - 1 sets - 3 reps - 20 seconds hold  - Modified Benja Stretch  - 2 x daily - 7 x weekly - 1 sets - 3 reps - 20 seconds hold  - Clam  - 1 x daily - 7 x weekly - 2 sets - 10 reps  - Sidelying Reverse Clamshell  - 1 x daily - 7 x weekly - 2 sets - 10 reps    Prior:  Use a rollator on walks to decr pain    Prior:  Reviewed vacuuming technique and TrA use with this and lifting, bed mobility etc.    Continue HEP:  Prior:   add TrA 5\" 2 x 10, and ice right gluteal musculature/piriformis 2 times a day for 20 minutes as tolerated    HEP:Access Code: " "[email or web address]  URL: [email or web address]  Date: 01/30/2025  Prepared by: Heladio Harrison     Exercises  - Supine Posterior Pelvic Tilt  - 1 x daily - 7 x weekly - 2 sets - 10 reps - 5 seconds hold  - Supine Single Knee to Chest Stretch  - 1 x daily - 7 x weekly - 1 sets - 3 reps - 10 seconds hold  - Supine Piriformis Stretch with Foot on Ground  - 1 x daily - 7 x weekly - 1 sets - 2 reps - 10 seconds hold  - Seated Heel Slide  - 2 x daily - 7 x weekly - 2 sets - 10-20 reps  If exercise significantly increases pain, stop    Assessment:     Pt's response to treatment: + discomfort with active release as expected-pt ed to ice at home  Areas of improvements: HEP knowledge.  Pelvic asymmetries corrected after MET  Limitations/deficits: Pain, weakness, flexibility, pain with walking and vacuum    Pain end of session:  R piriformis discomfort    Plan:     Continued PT required to reach all goals and incr ROM strength decr pain    Assess pelvic symmetry-MET as needed.  Could consider dry needle if ins covers.    Have patient bring in HEP after next session to consolidate HEP as needed.    Skilled PT consisting of:  Aquatics, therapeutic exercise, therapeutic activity, NMR, manual, thermal, electric stimulation, US, light therapy, gait training, transfer training, dry needle.  Mechanical traction PRN and only if OK by PT, canalith repositioning  Rehab Potential: good  Frequency:  2x/wk  Duration:  10 weeks  1- to 4- Medicare cert date:       Goals:     STG:   Pt to be I in initial HEP.     LTG\"s:  Incr MMT of right lower extremity and core by 1 grade for less pain with vacuum.  Improve score on outcome form by 10 points to show incr in function.  Incr ROM of standing trunk extension to moderate decrease for less strain with ADL  Decr max pain level to 3-4 for restful sleep, and walking at least a mile.     Some of This note was created using voice recognition software and was not corrected for typographical or " grammatical errors.

## 2025-03-14 ENCOUNTER — TREATMENT (OUTPATIENT)
Dept: PHYSICAL THERAPY | Facility: CLINIC | Age: 80
End: 2025-03-14
Payer: MEDICARE

## 2025-03-14 DIAGNOSIS — M25.551 BILATERAL HIP PAIN: ICD-10-CM

## 2025-03-14 DIAGNOSIS — M25.561 BILATERAL CHRONIC KNEE PAIN: ICD-10-CM

## 2025-03-14 DIAGNOSIS — M25.562 BILATERAL CHRONIC KNEE PAIN: ICD-10-CM

## 2025-03-14 DIAGNOSIS — M54.50 ACUTE BILATERAL LOW BACK PAIN WITHOUT SCIATICA: ICD-10-CM

## 2025-03-14 DIAGNOSIS — G89.29 BILATERAL CHRONIC KNEE PAIN: ICD-10-CM

## 2025-03-14 DIAGNOSIS — M25.552 BILATERAL HIP PAIN: ICD-10-CM

## 2025-03-14 DIAGNOSIS — M16.0 PRIMARY OSTEOARTHRITIS OF HIPS, BILATERAL: ICD-10-CM

## 2025-03-14 DIAGNOSIS — M17.0 PRIMARY OSTEOARTHRITIS OF KNEES, BILATERAL: ICD-10-CM

## 2025-03-14 PROCEDURE — 97110 THERAPEUTIC EXERCISES: CPT | Mod: GP | Performed by: PHYSICAL THERAPIST

## 2025-03-14 NOTE — PROGRESS NOTES
Physical Therapy Treatment  Phase II 15 min dry needling only  Fee for service    Patient Name: Pat Jaimes  MRN: [id number]  Encounter Date: 3/19/2025  Time Calculation  Start Time: 1245  Stop Time: 1300  Time Calculation (min): 15 min      Jaylon Dickey.  on 3/20 you will be seeing one of my patients, Jazmin Jaimes, for a phase 2 dry needle with you for her R piriformis.  She has tightness and tenderness there, and at times pain with stairs.  Always pain when trying to walk longer distances.  We have done strengthening and stretching, flexion exercises of L spine, CFM, STM, active release, and MET for pelvic asymmetry, but no change and no improvement.  I am running out of options and so I thought we could try dry needle.  FYI her  comes in with her every treatment and always has questions-just a curious sort.  LMK if you have any questions.  Thank you!    Subjective:  Reason for visit:  Right hip pain          Phase II Service:  Dry Needling    Medical history and health screening form reviewed and Phase II service is appropriate and safe for patient to recieve    Referring Physician:  Self Referral    Precautions:  DM    Pain:    Pretreatment:  1  Post treatment:   1    Objective:  Mini Evaluation:    Tight and tender along the right piriformis and glute med    Treatment:    Dry Needling:  Pt educated in risks, benefits, precautions of dry needling.  Pt consents to dry needling.  Pt position:  left sidelying  Tender areas palpated and mapped.  six three inch needles inserted into piriformis, two rows  three two in inch needles inserted into glute med  Needles wound prior to being removed  No adverse reactions noted    Billed Treatment Times:  Manual Therapy  15 min    Assessment:    No adverse reaction to DN noted.  and Pt endorses relief from pain and tightness following session.      Problem list:    Patient requires intervention for pain relief    Plan:  Pt to follow-up with cash-based Dry Needling  PRN

## 2025-03-14 NOTE — PROGRESS NOTES
"Physical Therapy Treatment    Patient Name: Pat Jaimes  MRN: [id number]  Encounter date:  3/14/2025  Time Calculation  Start Time: 1114  Stop Time: 1154  Time Calculation (min): 40 min     PT Therapeutic Procedures Time Entry  Therapeutic Exercise Time Entry: 40    Visit Number:  7 (including evaluation)  Planned total visits: 10-20  Visit Authorized:  01/29/2025: MEDICARE A&B, 257 DED, 80% [place], [place], [employer]       Current Problem  Problem List Items Addressed This Visit             ICD-10-CM    Bilateral chronic knee pain M25.561, M25.562, G89.29    Bilateral hip pain M25.551, M25.552    Primary osteoarthritis of hips, bilateral M16.0    Primary osteoarthritis of knees, bilateral M17.0    Acute bilateral low back pain without sciatica M54.50       Precautions:   Hx:  DM, HTN, insulin pump    Pain:  1    Location R buttock  Description ache    Subjective  General      No changes.  Has declined to use rollator for distance walking.  Doing HEP consistently.        Objective:  ASIS near level    Prior:  + long sit test, + L squish test, + L LE apparent longer than L, + L ASIS low    Prior:  6-minute walk test: Patient achieved 941 feet in 6 minutes with wheeled walker with only 1 out of 10 right buttock pain    Prior:  Min to mod decrease in bilateral range of motion with left hip tighter than right hip in most areas.  Min decrease in flexibility bilateral hip flexors and quads  Decreased joint mobility noted right hip, left hip not tested today    Prior:  VERY TTP right much greater than left piriformis, and mild TTP right greater trochanter region.      Treatment:     Therapeutic Exercise to improve strength, flexibility, and decreased pain:  NuStep seat #7 upper extremities at #9, level 4  7 minutes   Bridge 5\" x 12  side step with T-band around thighsDNP  PPT 5 seconds 2 x 10  Tra 5\" 2 x 10  piriformis stretch x 30 seconds, and figure x 30\" ea   Bent knee fallouts 5 seconds x 10 each " "DNP  Butterfly stretch 10 x 10 seconds DNP  Itb STRETCH X 45\" EA  SAQ 2 x 10 ea  Hook lye march with TrA 2 x 10  SLR with PPT x 10 ea    Side-lying:  Clamshell and reverse clam 2 x 10 each  Hip abd x 10 ea    Seated:  Forward flexion stretch 5\" x 10 DNP  STS x 10 with TrA DNP  Hs stretch LLLD 2 x1' ea DNP    DNP the below:  LAQ 2 X 10  Iso hip add 3\" x 10  Right hip flexor and quad stretch x 45 seconds each in supine     Seated on fit disc, with TrA: DNP  EO 1' x 2  EC 1' x 2  B sh flexion with rainbow ball x 10  [place] ball push outs x 10    Standing, with TrA: DNP  3 way hip 2 x 10  Knee flexion 2 x 10  HR/TR 2 x 10  March 2'  Step ups 6.5 x 10 ea    Manual: DNP  Cross friction massage to right piriformis to decrease tension and pain  Rolling stick to right gluteal muscles to decrease tension  Long-leg right lower extremity distraction mobs grade 3 to increase mobility    Light therapy: Infra and red light DNP  Applied to: Right piriformis region  Patient position:  Left side lying  Comment:  Pt educated on light therapy and agrees to have this administered.      Time: 0    Activity tolerance:   Fair for walking    OP EDUCATION:  PRIOR:  Add bridge and s/s with O TB  Prior:  Access Code: [email or web address]  URL: [email or web address]  Date: 03/07/2025  Prepared by: Heladio Harrison    Exercises  - Seated Flexion Stretch  - 2 x daily - 7 x weekly - 2 sets - 10 reps - 5 seconds hold  - Supine Figure 4 Piriformis Stretch  - 2 x daily - 7 x weekly - 1 sets - 3 reps - 20 seconds hold  - Modified Benja Stretch  - 2 x daily - 7 x weekly - 1 sets - 3 reps - 20 seconds hold  - Clam  - 1 x daily - 7 x weekly - 2 sets - 10 reps  - Sidelying Reverse Clamshell  - 1 x daily - 7 x weekly - 2 sets - 10 reps    Prior:  Use a rollator on walks to decr pain    Prior:  Reviewed vacuuming technique and TrA use with this and lifting, bed mobility etc.    Continue HEP:  Prior:   add TrA 5\" 2 x 10, and ice right gluteal " "musculature/piriformis 2 times a day for 20 minutes as tolerated    HEP:Access Code: [email or web address]  URL: [email or web address]  Date: 01/30/2025  Prepared by: Heladio Harrison     Exercises  - Supine Posterior Pelvic Tilt  - 1 x daily - 7 x weekly - 2 sets - 10 reps - 5 seconds hold  - Supine Single Knee to Chest Stretch  - 1 x daily - 7 x weekly - 1 sets - 3 reps - 10 seconds hold  - Supine Piriformis Stretch with Foot on Ground  - 1 x daily - 7 x weekly - 1 sets - 2 reps - 10 seconds hold  - Seated Heel Slide  - 2 x daily - 7 x weekly - 2 sets - 10-20 reps  If exercise significantly increases pain, stop    Assessment:     Pt's response to treatment: pt remains with decr flexibility despite stretching here and at home, has discomfort with stretching that relieves with rest.  Areas of improvements: HEP knowledge.    Limitations/deficits: Pain, weakness, flexibility, pain with walking and vacuum    Pain end of session:  R piriformis 1/10    Plan:     Continued PT required to reach all goals and incr ROM strength decr pain    Pt will schedule dry needle for R piriformis-phase 2.     patient to bring in HEP after to consolidate/upgrade HEP as needed.    Skilled PT consisting of:  Aquatics, therapeutic exercise, therapeutic activity, NMR, manual, thermal, electric stimulation, US, light therapy, gait training, transfer training, dry needle.  Mechanical traction PRN and only if OK by PT, canalith repositioning  Rehab Potential: good  Frequency:  2x/wk  Duration:  10 weeks  1- to 4- Medicare cert date:       Goals:     STG:   Pt to be I in initial HEP.     LTG\"s:  Incr MMT of right lower extremity and core by 1 grade for less pain with vacuum.  Improve score on outcome form by 10 points to show incr in function.  Incr ROM of standing trunk extension to moderate decrease for less strain with ADL  Decr max pain level to 3-4 for restful sleep, and walking at least a mile.     Some of This note was created using " voice recognition software and was not corrected for typographical or grammatical errors.

## 2025-03-18 ENCOUNTER — TREATMENT (OUTPATIENT)
Dept: PHYSICAL THERAPY | Facility: CLINIC | Age: 80
End: 2025-03-18
Payer: MEDICARE

## 2025-03-18 DIAGNOSIS — M25.551 BILATERAL HIP PAIN: ICD-10-CM

## 2025-03-18 DIAGNOSIS — M25.552 BILATERAL HIP PAIN: ICD-10-CM

## 2025-03-18 DIAGNOSIS — M25.561 BILATERAL CHRONIC KNEE PAIN: ICD-10-CM

## 2025-03-18 DIAGNOSIS — M25.562 BILATERAL CHRONIC KNEE PAIN: ICD-10-CM

## 2025-03-18 DIAGNOSIS — M54.50 ACUTE BILATERAL LOW BACK PAIN WITHOUT SCIATICA: ICD-10-CM

## 2025-03-18 DIAGNOSIS — M17.0 PRIMARY OSTEOARTHRITIS OF KNEES, BILATERAL: ICD-10-CM

## 2025-03-18 DIAGNOSIS — M16.0 PRIMARY OSTEOARTHRITIS OF HIPS, BILATERAL: ICD-10-CM

## 2025-03-18 DIAGNOSIS — G89.29 BILATERAL CHRONIC KNEE PAIN: ICD-10-CM

## 2025-03-18 PROCEDURE — 97110 THERAPEUTIC EXERCISES: CPT | Mod: GP | Performed by: PHYSICAL THERAPIST

## 2025-03-18 NOTE — PROGRESS NOTES
"Physical Therapy Treatment    Patient Name: Pat Jaimes  MRN: [id number]  Encounter date:  3/18/2025  Time Calculation  Start Time: 1145  Stop Time: 1223  Time Calculation (min): 38 min     PT Therapeutic Procedures Time Entry  Therapeutic Exercise Time Entry: 38    Visit Number:  8 (including evaluation)  Planned total visits: 10-20  Visit Authorized:  01/29/2025: MEDICARE A&B, 257 DED, 80% [place], [employer]   Re-eval on #10    Current Problem  Problem List Items Addressed This Visit             ICD-10-CM    Bilateral chronic knee pain M25.561, M25.562, G89.29    Bilateral hip pain M25.551, M25.552    Primary osteoarthritis of hips, 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    Prior:   No changes.  Has declined to use rollator for distance walking.  Doing HEP consistently.      Objective:  ASIS near level    Prior:  + long sit test, + L squish test, + L LE apparent longer than L, + L ASIS low    Prior:  6-minute walk test: Patient achieved 941 feet in 6 minutes with wheeled walker with only 1 out of 10 right buttock pain    Prior:  Min to mod decrease in bilateral range of motion with left hip tighter than right hip in most areas.  Min decrease in flexibility bilateral hip flexors and quads  Decreased joint mobility noted right hip, left hip not tested today    Prior:  VERY TTP right much greater than left piriformis, and mild TTP right greater trochanter region.      Treatment:     Therapeutic Exercise to improve strength, flexibility, and decreased pain:  NuStep seat #7 upper extremities at #9, level 4  8 minutes   Bridge 5\" x 12  side step with T-band around thighs DNP  PPT 5 seconds 2 x 10  Tra 5\" 2 x 10  piriformis stretch x 30 seconds, and " "figure 4 x 30\" ea DNP  Bent knee fallouts 5 seconds x 10 each   Butterfly stretch 10 x 10 seconds DNP  Itb STRETCH X 45\" EA dnp  SAQ 2 x 10 ea DNP  Hook lye march with TrA 2 x 10  SLR with PPT 2 x 10 ea    Side-lying:  Clamshell and reverse clam 2 x 10 each  Hip abd x 10 ea DNP    Seated:  Forward flexion stretch 5\" x 10 DNP  STS x 10 with TrA   Hs stretch LLLD 2 x1' ea DNP  LAQ 2 X 10  Iso hip add 3\" x 10    Standing, with TrA: DNP  3 way hip 2 x 10  Knee flexion 2 x 10  HR/TR 2 x 10  March 2'    DNP the below:  Right hip flexor and quad stretch x 45 seconds each in supine     Seated on fit disc, with TrA: DNP  EO 1' x 2  EC 1' x 2  B sh flexion with rainbow ball x 10  [place] ball push outs x 10    Standing, with TrA: dnp  Step ups 6.5 x 10 ea    Manual: DNP  Cross friction massage to right piriformis to decrease tension and pain  Rolling stick to right gluteal muscles to decrease tension  Long-leg right lower extremity distraction mobs grade 3 to increase mobility    Light therapy: Infra and red light DNP  Applied to: Right piriformis region  Patient position:  Left side lying  Comment:  Pt educated on light therapy and agrees to have this administered.      Time: 0    Activity tolerance:   Fair for walking    OP EDUCATION:  PRIOR:  Add bridge and s/s with O TB  Prior:  Access Code: [email or web address]  URL: [email or web address]  Date: 03/07/2025  Prepared by: Heladio Harrison    Exercises  - Seated Flexion Stretch  - 2 x daily - 7 x weekly - 2 sets - 10 reps - 5 seconds hold  - Supine Figure 4 Piriformis Stretch  - 2 x daily - 7 x weekly - 1 sets - 3 reps - 20 seconds hold  - Modified Benja Stretch  - 2 x daily - 7 x weekly - 1 sets - 3 reps - 20 seconds hold  - Clam  - 1 x daily - 7 x weekly - 2 sets - 10 reps  - Sidelying Reverse Clamshell  - 1 x daily - 7 x weekly - 2 sets - 10 reps    Prior:  Use a rollator on walks to decr pain    Prior:  Reviewed vacuuming technique and TrA use with this and lifting, bed " "mobility etc.    Continue HEP:  Prior:   add TrA 5\" 2 x 10, and ice right gluteal musculature/piriformis 2 times a day for 20 minutes as tolerated    HEP:Access Code: [email or web address]  URL: [email or web address]  Date: 01/30/2025  Prepared by: Heladio Harrison     Exercises  - Supine Posterior Pelvic Tilt  - 1 x daily - 7 x weekly - 2 sets - 10 reps - 5 seconds hold  - Supine Single Knee to Chest Stretch  - 1 x daily - 7 x weekly - 1 sets - 3 reps - 10 seconds hold  - Supine Piriformis Stretch with Foot on Ground  - 1 x daily - 7 x weekly - 1 sets - 2 reps - 10 seconds hold  - Seated Heel Slide  - 2 x daily - 7 x weekly - 2 sets - 10-20 reps  If exercise significantly increases pain, stop    Assessment:     Pt's response to treatment: pt remains with decr flexibility despite stretching here and at home, has discomfort with stretching that relieves with rest.  Areas of improvements: HEP knowledge.    Limitations/deficits: Pain, weakness, flexibility, pain with walking and vacuum    Pain end of session:  R piriformis 1/10    Plan:   Re-eval on #10  Continued PT required to reach all goals and incr ROM strength decr pain    Pt has dry needle for R piriformis-phase 2, tomorrow.    patient to bring in HEP after to consolidate/upgrade HEP as needed.    Skilled PT consisting of:  Aquatics, therapeutic exercise, therapeutic activity, NMR, manual, thermal, electric stimulation, US, light therapy, gait training, transfer training, dry needle.  Mechanical traction PRN and only if OK by PT, canalith repositioning  Rehab Potential: good  Frequency:  2x/wk  Duration:  10 weeks  1- to 4- Medicare cert date:       Goals:     STG:   Pt to be I in initial HEP.     LTG\"s:  Incr MMT of right lower extremity and core by 1 grade for less pain with vacuum.  Improve score on outcome form by 10 points to show incr in function.  Incr ROM of standing trunk extension to moderate decrease for less strain with ADL  Decr max pain level " to 3-4 for restful sleep, and walking at least a mile.     Some of This note was created using voice recognition software and was not corrected for typographical or grammatical errors.

## 2025-03-19 ENCOUNTER — TREATMENT (OUTPATIENT)
Dept: PHYSICAL THERAPY | Facility: CLINIC | Age: 80
End: 2025-03-19
Payer: MEDICARE

## 2025-03-19 DIAGNOSIS — M16.0 PRIMARY OSTEOARTHRITIS OF HIPS, BILATERAL: Primary | ICD-10-CM

## 2025-03-19 PROCEDURE — 4200000004 HC PT PHASE II 15 MIN CHG: Mod: GP | Performed by: PHYSICAL THERAPIST

## 2025-03-20 ENCOUNTER — APPOINTMENT (OUTPATIENT)
Dept: PHYSICAL THERAPY | Facility: CLINIC | Age: 80
End: 2025-03-20
Payer: MEDICARE

## 2025-03-21 ENCOUNTER — TREATMENT (OUTPATIENT)
Dept: PHYSICAL THERAPY | Facility: CLINIC | Age: 80
End: 2025-03-21
Payer: MEDICARE

## 2025-03-21 DIAGNOSIS — M54.50 ACUTE BILATERAL LOW BACK PAIN WITHOUT SCIATICA: ICD-10-CM

## 2025-03-21 DIAGNOSIS — M16.0 PRIMARY OSTEOARTHRITIS OF HIPS, BILATERAL: ICD-10-CM

## 2025-03-21 DIAGNOSIS — M25.552 BILATERAL HIP PAIN: ICD-10-CM

## 2025-03-21 DIAGNOSIS — M25.551 BILATERAL HIP PAIN: ICD-10-CM

## 2025-03-21 DIAGNOSIS — G89.29 BILATERAL CHRONIC KNEE PAIN: ICD-10-CM

## 2025-03-21 DIAGNOSIS — M17.0 PRIMARY OSTEOARTHRITIS OF KNEES, BILATERAL: ICD-10-CM

## 2025-03-21 DIAGNOSIS — M25.561 BILATERAL CHRONIC KNEE PAIN: ICD-10-CM

## 2025-03-21 DIAGNOSIS — M25.562 BILATERAL CHRONIC KNEE PAIN: ICD-10-CM

## 2025-03-21 PROCEDURE — 97110 THERAPEUTIC EXERCISES: CPT | Mod: GP | Performed by: PHYSICAL THERAPIST

## 2025-03-21 NOTE — PROGRESS NOTES
Physical Therapy Treatment    Patient Name: Pat Jaimes  MRN: [id number]  Encounter date:  3/21/2025  Time Calculation  Start Time: 1115  Stop Time: 1202  Time Calculation (min): 47 min     PT Therapeutic Procedures Time Entry  Therapeutic Exercise Time Entry: 40    Visit Number:  10 (including evaluation)  Planned total visits: 13  Visit Authorized:  01/29/2025: MEDICARE A&B, 257 DED, 80% [place], [employer]  Medicare CERT.  1/30/2025 to 4/30/2025    Current Problem  Problem List Items Addressed This Visit             ICD-10-CM    Bilateral chronic knee pain M25.561, M25.562, G89.29    Bilateral hip pain M25.551, M25.552    Primary osteoarthritis of hips, bilateral M16.0    Primary osteoarthritis of knees, bilateral M17.0    Acute bilateral low back pain without sciatica M54.50       Precautions:   Hx:  DM, HTN, insulin pump    Pain:  0  Location right buttock and right hip, low back  Description     Subjective  General       Patient states no adverse symptoms from dry needling session, however no improvement with pain in her buttock with walking.  Patient states she does not have much pain in the lateral hip with walking.  Max pain is a 9 in R buttock with walking outdoors. Pt offers today for the first time that she uses a cane when she walks longer 1000'.  Pt can only walk 1/3 of a mile with 1-2 rest breaks due to severe pain.  Pt does not sleep on a bed, due to wanting to be close to the kitchen overnight.    Prior:   No changes.  Has declined to use rollator for distance walking.  Doing HEP consistently.    OBJECTIVE:  If left blank, assume NT:       Myotomes/MMT's R L   Hip Flexion 4 +/5 mild right buttock pain 4+/5   Knee Extension 5/5  5/5   Ankle Dorsiflexion 5/5 5/5         Lumbar spine/trunk ROM S/S   FIS     EIS mod decrease NE     Palpation:  Right piriformis mildly to moderate tender to palpation, left piriformis mildly tender to palpation  Special Tests:  Sacroiliac  "cluster:  Gapping test negative, however difficult to accurately assess this test due to patient body habitus and very tender to pressure  Posterior shear or thigh thrust is positive on the right side, negative on the left  Compression in side-lying is negative both sides  Sacral thrust is negative with only mild discomfort due to pressure  Pelvic torsion test is negative tested with right leg off the edge    Gait:  Mild decreased linh and step length, mild decreased foot clearance     Outcome Measures:     WOMAC= 42 out of 96    Prior:  ASIS near level  6-minute walk test: Patient achieved 941 feet in 6 minutes with wheeled walker with only 1 out of 10 right buttock pain  Min to mod decrease in bilateral range of motion with left hip tighter than right hip in most areas.  Min decrease in flexibility bilateral hip flexors and quads  Decreased joint mobility noted right hip, left hip not tested today    Treatment:     Therapeutic Exercise to improve strength, flexibility, and decreased pain:  NuStep seat #7 upper extremities at #9, level 4  8 minutes   Bridge x 5  Tra 5\" x 10  Itb STRETCH X 45\" EA dnp  Hook lye march with TrA  x 10  Double T knee-to-chest 3 seconds 2 x 10-cues for technique with good follow-through    Activity tolerance:   Poor to fair for walking outdoors    OP EDUCATION:  Access Code: [email or web address]  URL: [email or web address]  Date: 03/21/2025  Prepared by: Heladio Harrison    Exercises  - Supine Double Knee to Chest  - 4 x daily - 7 x weekly - 1 sets - 10 reps - 3 seconds hold  Okay to stop all other exercises if needed to ensure this exercises post performed 4 times a day.    PRIOR:  Add bridge and s/s with O TB  Prior:  Access Code: [email or web address]  URL: [email or web address]  Date: 03/07/2025  Prepared by: Heladio Harrison    Exercises  - Seated Flexion Stretch  - 2 x daily - 7 x weekly - 2 sets - 10 reps - 5 seconds hold  - Supine Figure 4 Piriformis Stretch  - 2 x daily - 7 x weekly - 1 sets - " "3 reps - 20 seconds hold  - Modified Benja Stretch  - 2 x daily - 7 x weekly - 1 sets - 3 reps - 20 seconds hold  - Clam  - 1 x daily - 7 x weekly - 2 sets - 10 reps  - Sidelying Reverse Clamshell  - 1 x daily - 7 x weekly - 2 sets - 10 reps  Use a rollator on walks to decr pain  Reviewed vacuuming technique and TrA use with this and lifting, bed mobility etc.   add TrA 5\" 2 x 10, and ice right gluteal musculature/piriformis 2 times a day for 20 minutes as tolerated      HEP:Access Code: [email or web address]  URL: [email or web address]  Date: 01/30/2025  Prepared by: Heladio Harrison     Exercises  - Supine Posterior Pelvic Tilt  - 1 x daily - 7 x weekly - 2 sets - 10 reps - 5 seconds hold  - Supine Single Knee to Chest Stretch  - 1 x daily - 7 x weekly - 1 sets - 3 reps - 10 seconds hold  - Supine Piriformis Stretch with Foot on Ground  - 1 x daily - 7 x weekly - 1 sets - 2 reps - 10 seconds hold  - Seated Heel Slide  - 2 x daily - 7 x weekly - 2 sets - 10-20 reps  If exercise significantly increases pain, stop    Assessment:     Patient is progressing with strength and has less pain with some MMT's and trunk range of motion.  However, pain is no better when trying to walk on her street, where patient uses a cane when she walks outdoors but can only do one third of a mile total, and that is with 2 rest breaks leaning onto cane.  Walking this type of distance outdoors on her street always gives her lots of pain.  At this time, clinically, patient's SI cluster testing do not appear to indicate a significant sacroiliac issue.  Patient does have less pain upon palpation of right piriformis after manual work, active release, and phase 2 dry needling.  And progress report  Limitations/deficits: Pain, weakness, flexibility, pain with walking and vacuum    Pain end of session:  R piriformis 1/10    Plan:   Next session asked patient on complaints of flexion in lying 4 times a day, to more assess if there is a lumbar component to " "her current symptoms.  Test pelvic torsion with left leg off plinth, and asked patient about slope of the road.  I am wondering if the slope of the road or sidewalk is what is causing patient's biggest issue, as she has been able to walk 941 feet while here in PT recently with only 1 out of 10 pain.  If patient no better after 2-3 more physical therapy visits, we will put on hold and refer back to MD.  For now patient to focus HEP on double knee-to-chest 4 times a day to see if repeated lower lumbar flexion causes any change in symptoms.      Continued PT required to reach all goals and incr ROM strength decr pain    Pt has had dry needle for R piriformis-phase 2    patient to bring in HEP after to consolidate/upgrade HEP as needed.    Skilled PT consisting of:  Aquatics, therapeutic exercise, therapeutic activity, NMR, manual, thermal, electric stimulation, US, light therapy, gait training, transfer training, dry needle.  Mechanical traction PRN and only if OK by PT, canalith repositioning  Rehab Potential: good  Frequency:  2x/wk  Duration:  20 weeks  Anticipate 2 or 3 more visits to try to reach improvement    Goals:     STG:   Pt to be I in initial HEP.-goal met     LTG\"s:  Incr MMT of right lower extremity and core by 1 grade for less pain with vacuum.-progressing  Improve score on outcome form by 10 points to show incr in function. Goal not met  Incr ROM of standing trunk extension to moderate decrease for less strain with ADL goal met  Decr max pain level to 3-4 for restful sleep, and walking at least a mile.  Goal not met     Some of This note was created using voice recognition software and was not corrected for typographical or grammatical errors.            "

## 2025-03-25 ENCOUNTER — TREATMENT (OUTPATIENT)
Dept: PHYSICAL THERAPY | Facility: CLINIC | Age: 80
End: 2025-03-25
Payer: MEDICARE

## 2025-03-25 DIAGNOSIS — M25.551 BILATERAL HIP PAIN: ICD-10-CM

## 2025-03-25 DIAGNOSIS — M16.0 PRIMARY OSTEOARTHRITIS OF HIPS, BILATERAL: ICD-10-CM

## 2025-03-25 DIAGNOSIS — M25.561 BILATERAL CHRONIC KNEE PAIN: ICD-10-CM

## 2025-03-25 DIAGNOSIS — M54.50 ACUTE BILATERAL LOW BACK PAIN WITHOUT SCIATICA: ICD-10-CM

## 2025-03-25 DIAGNOSIS — M17.0 PRIMARY OSTEOARTHRITIS OF KNEES, BILATERAL: ICD-10-CM

## 2025-03-25 DIAGNOSIS — M25.552 BILATERAL HIP PAIN: ICD-10-CM

## 2025-03-25 DIAGNOSIS — G89.29 BILATERAL CHRONIC KNEE PAIN: ICD-10-CM

## 2025-03-25 DIAGNOSIS — M25.562 BILATERAL CHRONIC KNEE PAIN: ICD-10-CM

## 2025-03-25 PROCEDURE — 97116 GAIT TRAINING THERAPY: CPT | Mod: GP | Performed by: PHYSICAL THERAPIST

## 2025-03-25 PROCEDURE — 97110 THERAPEUTIC EXERCISES: CPT | Mod: GP | Performed by: PHYSICAL THERAPIST

## 2025-03-25 NOTE — PROGRESS NOTES
Physical Therapy Treatment    Patient Name: Pat Jaimes  MRN: [id number]  Encounter date:  3/25/2025  Time Calculation  Start Time: 1145  Stop Time: 1223  Time Calculation (min): 38 min     PT Therapeutic Procedures Time Entry  Therapeutic Exercise Time Entry: 5  Gait Training Time Entry: 33    Visit Number:  11 (including evaluation)  Planned total visits: 13  Visit Authorized:  01/29/2025: MEDICARE A&B, 257 DED, 80% [place], [place], [employer]  Medicare CERT.  1/30/2025 to 4/30/2025    Current Problem  Problem List Items Addressed This Visit             ICD-10-CM    Bilateral chronic knee pain M25.561, M25.562, G89.29    Bilateral hip pain M25.551, M25.552    Primary osteoarthritis of hips, bilateral M16.0    Primary osteoarthritis of knees, bilateral M17.0    Acute bilateral low back pain without sciatica M54.50       Precautions:   Hx:  DM, HTN, insulin pump    Pain:  0  Location right buttock and right hip, low back  Description     Subjective  General     Patient states that when she walks on her street she walks a little right of center, where surfaces ground, and so her right foot is stepping on a slightly lower surface than left foot.  Patient comes today to PT with cane, which is too high for her.  Patient did flexion in lying 4 times a day with no improvement in symptoms with her walk on her street.    Prior:  Patient states no adverse symptoms from dry needling session, however no improvement with pain in her buttock with walking.  Patient states she does not have much pain in the lateral hip with walking.  Max pain is a 9 in R buttock with walking outdoors. Pt offers today for the first time that she uses a cane when she walks longer 1000'.  Pt can only walk 1/3 of a mile with 1-2 rest breaks due to severe pain.  Pt does not sleep on a bed, due to wanting to be close to the kitchen overnight.    Prior:   No changes.  Has declined to use rollator for distance walking.  Doing HEP  consistently.    OBJECTIVE:  If left blank, assume NT:  6-minute walk test with cane: 590 feet in 6 minutes with pain reaching 4-4-1/2 out of 10, patient walks with a little bit more flexed posture when cane is too high for her.    Prior:     Myotomes/MMT's R L   Hip Flexion 4 +/5 mild right buttock pain 4+/5   Knee Extension 5/5  5/5   Ankle Dorsiflexion 5/5 5/5         Lumbar spine/trunk ROM S/S   FIS     EIS mod decrease NE     Palpation:  Right piriformis mildly to moderate tender to palpation, left piriformis mildly tender to palpation  Special Tests:  Sacroiliac cluster:  Gapping test negative, however difficult to accurately assess this test due to patient body habitus and very tender to pressure  Posterior shear or thigh thrust is positive on the right side, negative on the left  Compression in side-lying is negative both sides  Sacral thrust is negative with only mild discomfort due to pressure  Pelvic torsion test is negative tested with right leg off the edge    Gait:  Mild decreased linh and step length, mild decreased foot clearance     Outcome Measures:     WOMAC= 42 out of 96    Prior:  ASIS near level  6-minute walk test: Patient achieved 941 feet in 6 minutes with wheeled walker with only 1 out of 10 right buttock pain  Min to mod decrease in bilateral range of motion with left hip tighter than right hip in most areas.  Min decrease in flexibility bilateral hip flexors and quads  Decreased joint mobility noted right hip, left hip not tested today    Treatment:     Therapeutic Exercise to improve strength, flexibility, and decreased pain:  NuStep seat #7 upper extremities at #9, level 4  4 minutes     Patient cane adjusted to proper height and patient walks around the facility with this side with improved posture as well as improved ability to advance cane after instruction on use and posture.  Patient instructed on ambulating on her straight with scanning the ground as needed for trip hazards  "etc., and shown how to do this more with head tilt and looking perhaps 8 feet in front of her, as opposed to looking down the right in front of her with more neck flexion which leads to more trunk flexion, which can be increasing her pain with walking on her street.  Patient instructed on trying to walk on the opposite side of the apex of the road, where the ground will be opposite as to how she is walking now, to see if this decreases her pain.  After all this gait training and recommendation patient and  state understanding.  DNP the below:  Bridge x 5  Tra 5\" x 10  Itb STRETCH X 45\" EA dnp  Hook lye march with TrA  x 10  Double T knee-to-chest 3 seconds 2 x 10-cues for technique with good follow-through    Activity tolerance:   Poor to fair for walking outdoors    OP EDUCATION:  Access Code: [email or web address]  URL: [email or web address]  Date: 03/21/2025  Prepared by: Heladio Harrison    Exercises  - Supine Double Knee to Chest  - 4 x daily - 7 x weekly - 1 sets - 10 reps - 3 seconds hold  Okay to stop all other exercises if needed to ensure this exercises post performed 4 times a day.    PRIOR:  Add bridge and s/s with O TB  Prior:  Access Code: [email or web address]  URL: [email or web address]  Date: 03/07/2025  Prepared by: Heladio Harrison    Exercises  - Seated Flexion Stretch  - 2 x daily - 7 x weekly - 2 sets - 10 reps - 5 seconds hold  - Supine Figure 4 Piriformis Stretch  - 2 x daily - 7 x weekly - 1 sets - 3 reps - 20 seconds hold  - Modified Benja Stretch  - 2 x daily - 7 x weekly - 1 sets - 3 reps - 20 seconds hold  - Clam  - 1 x daily - 7 x weekly - 2 sets - 10 reps  - Sidelying Reverse Clamshell  - 1 x daily - 7 x weekly - 2 sets - 10 reps  Use a rollator on walks to decr pain  Reviewed vacuuming technique and TrA use with this and lifting, bed mobility etc.   add TrA 5\" 2 x 10, and ice right gluteal musculature/piriformis 2 times a day for 20 minutes as tolerated      HEP:Access Code: [email or web address]  URL: " [email or web address]  Date: 01/30/2025  Prepared by: Heladio Harrison     Exercises  - Supine Posterior Pelvic Tilt  - 1 x daily - 7 x weekly - 2 sets - 10 reps - 5 seconds hold  - Supine Single Knee to Chest Stretch  - 1 x daily - 7 x weekly - 1 sets - 3 reps - 10 seconds hold  - Supine Piriformis Stretch with Foot on Ground  - 1 x daily - 7 x weekly - 1 sets - 2 reps - 10 seconds hold  - Seated Heel Slide  - 2 x daily - 7 x weekly - 2 sets - 10-20 reps  If exercise significantly increases pain, stop    Assessment:       Patient pain down to 1 out of 10 after session, although did go up to 4-4-1/2 with 6-minute walk test with cane.  Patient with improved form with cane after cues.    Prior:  Patient is progressing with strength and has less pain with some MMT's and trunk range of motion.  However, pain is no better when trying to walk on her street, where patient uses a cane when she walks outdoors but can only do one third of a mile total, and that is with 2 rest breaks leaning onto cane.  Walking this type of distance outdoors on her street always gives her lots of pain.  At this time, clinically, patient's SI cluster testing do not appear to indicate a significant sacroiliac issue.  Patient does have less pain upon palpation of right piriformis after manual work, active release, and phase 2 dry needling.  And progress report  Limitations/deficits: Pain, weakness, flexibility, pain with walking and vacuum    Pain end of session:  R piriformis 1/10    Plan: Next session asked patient what effect new gait instruction has had on pain.    If patient no better after 2 more physical therapy visits, we will put on hold and refer back to MD.  For now patient to focus on walking with cane and proper height, maintaining an upright posture, and walking on the upside side of the apex of the road to see if this helps her symptoms.    Continued PT required to reach all goals and incr ROM strength decr pain    Pt has had  "dry needle for R piriformis-phase 2    patient to bring in HEP after to consolidate/upgrade HEP as needed.    Skilled PT consisting of:  Aquatics, therapeutic exercise, therapeutic activity, NMR, manual, thermal, electric stimulation, US, light therapy, gait training, transfer training, dry needle.  Mechanical traction PRN and only if OK by PT, canalith repositioning  Rehab Potential: good  Frequency:  2x/wk  Duration:  20 weeks  Anticipate 2 or 3 more visits to try to reach improvement    Goals:     STG:   Pt to be I in initial HEP.-goal met     LTG\"s:  Incr MMT of right lower extremity and core by 1 grade for less pain with vacuum.-progressing  Improve score on outcome form by 10 points to show incr in function. Goal not met  Incr ROM of standing trunk extension to moderate decrease for less strain with ADL goal met  Decr max pain level to 3-4 for restful sleep, and walking at least a mile.  Goal not met     Some of This note was created using voice recognition software and was not corrected for typographical or grammatical errors.            "

## 2025-03-28 ENCOUNTER — TREATMENT (OUTPATIENT)
Dept: PHYSICAL THERAPY | Facility: CLINIC | Age: 80
End: 2025-03-28
Payer: MEDICARE

## 2025-03-28 DIAGNOSIS — M25.551 BILATERAL HIP PAIN: ICD-10-CM

## 2025-03-28 DIAGNOSIS — M25.561 BILATERAL CHRONIC KNEE PAIN: ICD-10-CM

## 2025-03-28 DIAGNOSIS — M25.562 BILATERAL CHRONIC KNEE PAIN: ICD-10-CM

## 2025-03-28 DIAGNOSIS — M16.0 PRIMARY OSTEOARTHRITIS OF HIPS, BILATERAL: ICD-10-CM

## 2025-03-28 DIAGNOSIS — M17.0 PRIMARY OSTEOARTHRITIS OF KNEES, BILATERAL: ICD-10-CM

## 2025-03-28 DIAGNOSIS — M54.50 ACUTE BILATERAL LOW BACK PAIN WITHOUT SCIATICA: ICD-10-CM

## 2025-03-28 DIAGNOSIS — G89.29 BILATERAL CHRONIC KNEE PAIN: ICD-10-CM

## 2025-03-28 DIAGNOSIS — M25.552 BILATERAL HIP PAIN: ICD-10-CM

## 2025-03-28 PROCEDURE — 97110 THERAPEUTIC EXERCISES: CPT | Mod: GP | Performed by: PHYSICAL THERAPIST

## 2025-03-28 NOTE — PROGRESS NOTES
Physical Therapy Treatment    Patient Name: Pat Jaimes  MRN: [id number]  Encounter date:  3/28/2025  Time Calculation  Start Time: 1117  Stop Time: 1147  Time Calculation (min): 30 min     PT Therapeutic Procedures Time Entry  Therapeutic Exercise Time Entry: 30    Visit Number:  12 (including evaluation)  Planned total visits: 12-13  Visit Authorized:  01/29/2025: MEDICARE A&B, 257 DED, 80% [place], [employer]  Medicare CERT.  1/30/2025 to 4/30/2025    Current Problem  Problem List Items Addressed This Visit             ICD-10-CM    Bilateral chronic knee pain M25.561, M25.562, G89.29    Bilateral hip pain M25.551, M25.552    Primary osteoarthritis of hips, bilateral M16.0    Primary osteoarthritis of knees, bilateral M17.0    Acute bilateral low back pain without sciatica M54.50       Precautions:   Hx:  DM, HTN, insulin pump    Pain:  1  Location right buttock and right hip, low back  Description     Subjective  General   Pt tried walking on other side of road, but no help.  Does like new cane height better, but still severe pain every walk about 800'. I get sore after PT.    Prior:  Patient states that when she walks on her street she walks a little right of center, where surfaces ground, and so her right foot is stepping on a slightly lower surface than left foot.  Patient comes today to PT with cane, which is too high for her.  Patient did flexion in lying 4 times a day with no improvement in symptoms with her walk on her street.    Prior:  Patient states no adverse symptoms from dry needling session, however no improvement with pain in her buttock with walking.  Patient states she does not have much pain in the lateral hip with walking.  Max pain is a 9 in R buttock with walking outdoors. Pt offers today for the first time that she uses a cane when she walks longer 1000'.  Pt can only walk 1/3 of a mile with 1-2 rest breaks due to severe pain.  Pt does not sleep on a bed, due to wanting to  "be close to the kitchen overnight.    Prior:   No changes.  Has declined to use rollator for distance walking.  Doing HEP consistently.    OBJECTIVE:  If left blank, assume NT:  B hip ROM remains limited despite regular stretching    Prior:  6-minute walk test with cane: 590 feet in 6 minutes with pain reaching 4-4-1/2 out of 10, patient walks with a little bit more flexed posture when cane is too high for her.    Prior:     Myotomes/MMT's R L   Hip Flexion 4 +/5 mild right buttock pain 4+/5   Knee Extension 5/5  5/5   Ankle Dorsiflexion 5/5 5/5         Lumbar spine/trunk ROM S/S   FIS     EIS mod decrease NE     Palpation:  Right piriformis mildly to moderate tender to palpation, left piriformis mildly tender to palpation  Special Tests:  Sacroiliac cluster:  Gapping test negative, however difficult to accurately assess this test due to patient body habitus and very tender to pressure  Posterior shear or thigh thrust is positive on the right side, negative on the left  Compression in side-lying is negative both sides  Sacral thrust is negative with only mild discomfort due to pressure  Pelvic torsion test is negative tested with right leg off the edge    Gait:  Mild decreased linh and step length, mild decreased foot clearance     Outcome Measures:     WOMAC= 42 out of 96    Prior:  ASIS near level  6-minute walk test: Patient achieved 941 feet in 6 minutes with wheeled walker with only 1 out of 10 right buttock pain  Min to mod decrease in bilateral range of motion with left hip tighter than right hip in most areas.  Min decrease in flexibility bilateral hip flexors and quads  Decreased joint mobility noted right hip, left hip not tested today    Treatment:     Therapeutic Exercise to improve strength, flexibility, and decreased pain:    Bridge 2 x 5  Tra 5\"2 x 10  Itb STRETCH X 45\" EA   Hook lye march with TrA  2 x 10  Double T knee-to-chest 10 seconds x 5  SKC 5 x 10\" ea  Piriformis stretch #1 and #2 3 x 20\" " "ea  Hip flexor stretch x 45\" ea  B shoulder flexion with ball and TrA 2 x 10  LAQ  2 x 20  Hip flexion 2 x 10  HR/Tr 2 x 20  STS 2 x 10    Activity tolerance:   Poor to fair for walking outdoors with cane, good with walking in clinic with wheeled walker    OP EDUCATION:r    Exercises  - Supine Double Knee to Chest  - 1 x daily - 7 x weekly - 1 sets - 10 reps - 3 seconds hold  -Bridge  -s/s with O TB    Access Code: [email or web address]  URL: [email or web address]  Date: 03/07/2025  Prepared by: Heladio aHrrison    Exercises  - Seated Flexion Stretch  - 2 x daily - 7 x weekly - 2 sets - 10 reps - 5 seconds hold  - Supine Figure 4 Piriformis Stretch  - 2 x daily - 7 x weekly - 1 sets - 3 reps - 20 seconds hold  - Modified Benja Stretch  - 2 x daily - 7 x weekly - 1 sets - 3 reps - 20 seconds hold  - Clam  - 1 x daily - 7 x weekly - 2 sets - 10 reps  - Sidelying Reverse Clamshell  - 1 x daily - 7 x weekly - 2 sets - 10 reps  Use a rollator on walks to decr pain  Reviewed vacuuming technique and TrA use with this and lifting, bed mobility etc.   add TrA 5\" 2 x 10, and ice right gluteal musculature/piriformis 2 times a day for 20 minutes as tolerated      HEP:Access Code: [email or web address]  URL: [email or web address]  Date: 01/30/2025  Prepared by: Heladio Harrison     Exercises  - Supine Posterior Pelvic Tilt  - 1 x daily - 7 x weekly - 2 sets - 10 reps - 5 seconds hold  - Supine Single Knee to Chest Stretch  - 1 x daily - 7 x weekly - 1 sets - 3 reps - 10 seconds hold  - Supine Piriformis Stretch with Foot on Ground  - 1 x daily - 7 x weekly - 1 sets - 2 reps - 10 seconds hold  - Seated Heel Slide  - 2 x daily - 7 x weekly - 2 sets - 10-20 reps  If exercise significantly increases pain, stop    Assessment:     Pt does well in PT, with minimal pain with all strengthen and stretch, but unfortunately, still gets severe pain right buttock every time she walks longer distances.    Prior:  Patient is progressing with strength and has " "less pain with some MMT's and trunk range of motion.  However, pain is no better when trying to walk on her street, where patient uses a cane when she walks outdoors but can only do one third of a mile total, and that is with 2 rest breaks leaning onto cane.  Walking this type of distance outdoors on her street always gives her lots of pain.  At this time, clinically, patient's SI cluster testing do not appear to indicate a significant sacroiliac issue.  Patient does have less pain upon palpation of right piriformis after manual work, active release, and phase 2 dry needling.  And progress report  Limitations/deficits: Pain, weakness, flexibility, pain with walking and vacuum    Pain end of session:  R piriformis 1/10    Plan:Hold PT-pt has met potential for PT.  I was able to discover that walking with a wheeled walker keeps R buttock/piriformis pain to a minimum with her longer walks, but pt declines this accomodation.  No other treatment that we tried has decr her pain with longer walks, ~800'.  Recommend pt re-consider use of wheeled walker/rollator for longer walks, and return to Doctor, maybe for consideration of botox injection to R piriformis.      Pt has had one dry needle for R piriformis-phase 2    Skilled PT consisting of:  Aquatics, therapeutic exercise, therapeutic activity, NMR, manual, thermal, electric stimulation, US, light therapy, gait training, transfer training, dry needle.  Mechanical traction PRN and only if OK by PT, canalith repositioning  Rehab Potential: good  Frequency:  2x/wk  Duration:  20 weeks      Goals:     STG:   Pt to be I in initial HEP.-goal met     LTG\"s:  Incr MMT of right lower extremity and core by 1 grade for less pain with vacuum.-progressing  Improve score on outcome form by 10 points to show incr in function. Goal not met  Incr ROM of standing trunk extension to moderate decrease for less strain with ADL goal met  Decr max pain level to 3-4 for restful sleep, and " walking at least a mile.  Goal not met     Some of This note was created using voice recognition software and was not corrected for typographical or grammatical errors.

## 2025-04-01 ENCOUNTER — APPOINTMENT (OUTPATIENT)
Dept: PHYSICAL THERAPY | Facility: CLINIC | Age: 80
End: 2025-04-01
Payer: MEDICARE

## 2025-04-06 NOTE — ASSESSMENT & PLAN NOTE
Dr. Yee lipid panel back in November the LDL cholesterol was 79.  Will review next lipid panel on her return visit.

## 2025-04-06 NOTE — ASSESSMENT & PLAN NOTE
Only 2 short bursts of NSVT seen on the [employer] monitor back in August.  We have been just monitoring on a clinical basis at this time with no pharmacotherapy.  Remains clinically stable with no symptomatic recurrences.

## 2025-04-06 NOTE — ASSESSMENT & PLAN NOTE
Only a mild degree of aortic valve regurgitation from echocardiogram March 2024.  Will plan on repeating echocardiogram approximately March 2026.

## 2025-04-07 NOTE — PROGRESS NOTES
Subjective      Chief Complaint   Patient presents with    Left Hip - Pain    Right Hip - Pain        Past Surgical History:   Procedure Laterality Date    BI STEREOTACTIC GUIDED BREAST RIGHT LOCALIZATION AND BIOPSY Right 02/02/2018    BI STEREOTACTIC GUIDED BREAST LOCALIZATION AND BIOPSY RIGHT [facility]    KNEE SURGERY Right 1989    ARTHROSCOPY    OTHER SURGICAL HISTORY  05/25/2021    Craniotomy    TUBAL LIGATION Bilateral 1982        Past Medical History:   Diagnosis Date    Diabetes mellitus type I (Multi)     Hyperlipidemia     Hypertension     Hypothyroidism     Insulin pump in place     Other specified diabetes mellitus without complications     Diabetes mellitus of other type without complication    Personal history of other diseases of the circulatory system     History of hypertension    Seizure disorder (Multi)         HPI  This 79 year old patient presents today with bilateral hip pain (9/10) and bilateral knee pain (5/10) The patient states that this bilateral hip pain has been worsening and persistent for months. The knee pain began first and then the hips. X-rays state there is arthritis in both hips and knees. The patient denies trauma or injury to either hip or knees. The patient states that their bilateral hip and knee pain is worse with and aggravated by prolonged walking and standing. The patient states that this bilateral  hip  and knee pain impairs their ability to complete normal activities of daily living. The patient has tried tylenol and ibuprofen with no relief. Did PT for knees in the past due to balance not being good and having vertigo; the PT did not help.     She presents today for follow up of bilateral hip 9/10. There has not been any improvement in the hip pain. She completed all 12 sessions of physical therapy and they found the peraformis muscle was inflamed. They did dry needling, ice and heat with no improvement.     Allergies   Allergen Reactions    Sulfa (Sulfonamide  Antibiotics) Hives    Sulfamethoxazole-Trimethoprim Hives    Erythromycin Other    Amoxicillin-Pot Clavulanate Diarrhea      Body mass index is 35.15 kg/m².     ROS  CARDIOLOGY:   Negative for chest pain, shortness of breath.   RESPIRATORY:   Negative for chest pain, shortness of breath.   MUSCULOSKELETAL:   See HPI for details.   NEUROLOGY:   Negative for tingling, numbness, weakness.    Objective      Body mass index is 35.15 kg/m².     Physical Exam  GENERAL:          General Appearance:  This is a pleasant patient with appropriate affect, in no acute distress.   DERMATOLOGY:          Skin: skin at the neck, upper and lower back, and trunk is intact. There is no evidence of skin rash, skin breakdown or ulceration, or atrophic skin change.   EXTREMITIES:          Vascular:  Right, left hands and feet are warm with good color and pulses. Right and left calf and thigh are nontender and nonswollen.   NEUROLOGICAL:          Orientation:  Patient is alert and oriented to person, place, time and situation. Right and left upper and lower extremity motor and sensory examinations are intact.  MUSCULOSKELETAL: Neck: No tenderness. No pain or limitation with range of motion. Back: No tenderness. Straight leg test negative bilaterally. Right  and Left hip: There is not tenderness at the greater trochanter. There is pain with gentle ROM in flexion and extension at the hip. There is pain with adduction, internal and external rotation and abduction. bilateral lower extremity is in good position. Nontender at the calf. Neurovascular is intact.  right and left knees: There is pain and crepitus with range of motion.  Melani's appears negative bilaterally.  Anterior drawer and Lachman's tests are negative bilaterally.  No effusion is present in either the right or left knees. The patient is seen walking today walking with a painful gait.    No results found.     X-rays of the right and left hips and right and left knees  And also  x-rays of the lumbar spine done on 12- are reviewed with the patient and her  in the office today and show osteoarthritis of the right and left hips and knees and also of the lumbar spine.  Pat was seen today for pain and pain.  Diagnoses and all orders for this visit:  Bilateral hip pain  -     Acupuncture; Future  Primary osteoarthritis of hips, bilateral  -     Acupuncture; Future     Options are discussed with the patient in detail. The patient requests and is given a referral for possible acupuncture since no other treatment has helped this patient.  The patient is instructed regarding activity modification and risk for further injury with falling or trauma, ice, provider directed at home gentle strengthening and ROM exercises, and the appropriate use of Tylenol as needed for pain with its potential adverse reactions and side effects. The patient understands.  she asks if any further options are available.  Cortisone injections to the right and left knees are discussed but the patient states that she has had diabetes and previous cortisone injection significantly raised her blood sugars.  We will first attempt to treat this patient with physical therapy.  If pain persists viscosupplementation injections to the right and left knees will be considered at follow-up visit. Follow up in  6-8 weeks or sooner as needed. Please note that this report has been produced using speech recognition software.  It may contain errors related to grammar, punctuation or spelling.  Electronically signed, but not reviewed.   Popeye Casey MD

## 2025-04-09 ENCOUNTER — OFFICE VISIT (OUTPATIENT)
Facility: CLINIC | Age: 80
End: 2025-04-09
Payer: MEDICARE

## 2025-04-09 VITALS
DIASTOLIC BLOOD PRESSURE: 60 MMHG | WEIGHT: 180 LBS | OXYGEN SATURATION: 93 % | HEART RATE: 77 BPM | BODY MASS INDEX: 35.15 KG/M2 | SYSTOLIC BLOOD PRESSURE: 138 MMHG

## 2025-04-09 DIAGNOSIS — I10 PRIMARY HYPERTENSION: Primary | ICD-10-CM

## 2025-04-09 DIAGNOSIS — I35.1 NONRHEUMATIC AORTIC VALVE INSUFFICIENCY: ICD-10-CM

## 2025-04-09 DIAGNOSIS — E78.2 MIXED HYPERLIPIDEMIA: ICD-10-CM

## 2025-04-09 DIAGNOSIS — I47.10 SVT (SUPRAVENTRICULAR TACHYCARDIA) (CMS-HCC): ICD-10-CM

## 2025-04-09 PROCEDURE — 3075F SYST BP GE 130 - 139MM HG: CPT | Performed by: INTERNAL MEDICINE

## 2025-04-09 PROCEDURE — 1126F AMNT PAIN NOTED NONE PRSNT: CPT | Performed by: INTERNAL MEDICINE

## 2025-04-09 PROCEDURE — 99214 OFFICE O/P EST MOD 30 MIN: CPT | Performed by: INTERNAL MEDICINE

## 2025-04-09 PROCEDURE — 1159F MED LIST DOCD IN RCRD: CPT | Performed by: INTERNAL MEDICINE

## 2025-04-09 PROCEDURE — 3078F DIAST BP <80 MM HG: CPT | Performed by: INTERNAL MEDICINE

## 2025-04-09 PROCEDURE — 1036F TOBACCO NON-USER: CPT | Performed by: INTERNAL MEDICINE

## 2025-04-09 ASSESSMENT — ENCOUNTER SYMPTOMS
DYSPNEA ON EXERTION: 0
NUMBNESS: 0
SHORTNESS OF BREATH: 0
PALPITATIONS: 0
LOSS OF SENSATION IN FEET: 0
PARESTHESIAS: 0
COUGH: 0
ABDOMINAL PAIN: 0
BLURRED VISION: 0
OCCASIONAL FEELINGS OF UNSTEADINESS: 0
MYALGIAS: 1
DEPRESSION: 0
HEMATURIA: 0
DYSURIA: 0

## 2025-04-09 ASSESSMENT — PAIN SCALES - GENERAL: PAINLEVEL_OUTOF10: 0-NO PAIN

## 2025-04-09 ASSESSMENT — LIFESTYLE VARIABLES: TOTAL SCORE: 0

## 2025-04-09 NOTE — PROGRESS NOTES
Subjective   Pat Jaimes is a 79 y.o. female.    Chief Complaint:  6 month f/u    HPI  Patient has been having lots of hip pain, saw a new orthopedic surgeon who recommended physical therapy.  Also with piriformis muscle discomfort which has been difficult to control.  Heart wise doing well with no chest pain or anginal symptoms.  She has not noted any palpitations.    Review of Systems   Constitutional: Negative for malaise/fatigue.   HENT:  Negative for congestion.    Eyes:  Negative for blurred vision.   Cardiovascular:  Negative for chest pain, dyspnea on exertion and palpitations.   Respiratory:  Negative for cough and shortness of breath.    Musculoskeletal:  Positive for joint pain and myalgias.   Gastrointestinal:  Negative for abdominal pain.   Genitourinary:  Negative for dysuria and hematuria.   Neurological:  Negative for numbness and paresthesias.       Objective   Constitutional:       Appearance: Not in distress.   Eyes:      Conjunctiva/sclera: Conjunctivae normal.   Neck:      Vascular: JVD normal.   Pulmonary:      Breath sounds: Normal breath sounds. No wheezing. No rhonchi. No rales.   Cardiovascular:      Normal rate. Regular rhythm.      Murmurs: There is no murmur.      No gallop.  No click. No rub.   Abdominal:      Palpations: Abdomen is soft.   Neurological:      General: No focal deficit present.      Mental Status: Alert.         Lab Review:       Assessment/Plan   The primary encounter diagnosis was Primary hypertension. Diagnoses of Nonrheumatic aortic valve insufficiency, SVT (supraventricular tachycardia) (CMS-HCC), and Mixed hyperlipidemia were also pertinent to this visit.    HLD (hyperlipidemia)  Dr. Yee lipid panel back in November the LDL cholesterol was 79.  Will review next lipid panel on her return visit.    Nonrheumatic aortic valve insufficiency  Only a mild degree of aortic valve regurgitation from echocardiogram March 2024.  Will plan on repeating echocardiogram  approximately March 2026.    SVT (supraventricular tachycardia) (CMS-HCC)  Only 2 short bursts of NSVT seen on the [employer] monitor back in August.  We have been just monitoring on a clinical basis at this time with no pharmacotherapy.  Remains clinically stable with no symptomatic recurrences.    HTN (hypertension)  Blood pressure mildly elevated but patient with a lot of hip and piriformis muscle pain and discomfort and she and her  both feel this is driving her blood pressure up.  Will just dress hygienic measures for now and repeat blood pressure on return visit.

## 2025-04-09 NOTE — ASSESSMENT & PLAN NOTE
Blood pressure mildly elevated but patient with a lot of hip and piriformis muscle pain and discomfort and she and her  both feel this is driving her blood pressure up.  Will just dress hygienic measures for now and repeat blood pressure on return visit.

## 2025-04-11 ENCOUNTER — OFFICE VISIT (OUTPATIENT)
Dept: ORTHOPEDIC SURGERY | Facility: CLINIC | Age: 80
End: 2025-04-11
Payer: MEDICARE

## 2025-04-11 VITALS — BODY MASS INDEX: 35.34 KG/M2 | WEIGHT: 180 LBS | HEIGHT: 60 IN

## 2025-04-11 DIAGNOSIS — M25.551 BILATERAL HIP PAIN: ICD-10-CM

## 2025-04-11 DIAGNOSIS — M16.0 PRIMARY OSTEOARTHRITIS OF HIPS, BILATERAL: ICD-10-CM

## 2025-04-11 DIAGNOSIS — M25.552 BILATERAL HIP PAIN: ICD-10-CM

## 2025-04-11 PROCEDURE — 1160F RVW MEDS BY RX/DR IN RCRD: CPT | Performed by: ORTHOPAEDIC SURGERY

## 2025-04-11 PROCEDURE — 99213 OFFICE O/P EST LOW 20 MIN: CPT | Performed by: ORTHOPAEDIC SURGERY

## 2025-04-11 PROCEDURE — 1159F MED LIST DOCD IN RCRD: CPT | Performed by: ORTHOPAEDIC SURGERY

## 2025-04-11 PROCEDURE — 1125F AMNT PAIN NOTED PAIN PRSNT: CPT | Performed by: ORTHOPAEDIC SURGERY

## 2025-04-11 PROCEDURE — 1036F TOBACCO NON-USER: CPT | Performed by: ORTHOPAEDIC SURGERY

## 2025-04-11 ASSESSMENT — PAIN - FUNCTIONAL ASSESSMENT: PAIN_FUNCTIONAL_ASSESSMENT: 0-10

## 2025-04-11 ASSESSMENT — PATIENT HEALTH QUESTIONNAIRE - PHQ9
2. FEELING DOWN, DEPRESSED OR HOPELESS: NOT AT ALL
1. LITTLE INTEREST OR PLEASURE IN DOING THINGS: NOT AT ALL
SUM OF ALL RESPONSES TO PHQ9 QUESTIONS 1 AND 2: 0

## 2025-04-11 ASSESSMENT — ENCOUNTER SYMPTOMS
OCCASIONAL FEELINGS OF UNSTEADINESS: 0
DEPRESSION: 0
LOSS OF SENSATION IN FEET: 0

## 2025-04-11 ASSESSMENT — COLUMBIA-SUICIDE SEVERITY RATING SCALE - C-SSRS
6. HAVE YOU EVER DONE ANYTHING, STARTED TO DO ANYTHING, OR PREPARED TO DO ANYTHING TO END YOUR LIFE?: NO
1. IN THE PAST MONTH, HAVE YOU WISHED YOU WERE DEAD OR WISHED YOU COULD GO TO SLEEP AND NOT WAKE UP?: NO
2. HAVE YOU ACTUALLY HAD ANY THOUGHTS OF KILLING YOURSELF?: NO

## 2025-04-11 ASSESSMENT — PAIN SCALES - GENERAL
PAINLEVEL_OUTOF10: 9
PAINLEVEL_OUTOF10: 9

## 2025-04-11 ASSESSMENT — LIFESTYLE VARIABLES
HOW OFTEN DO YOU HAVE A DRINK CONTAINING ALCOHOL: NEVER
HOW OFTEN DO YOU HAVE SIX OR MORE DRINKS ON ONE OCCASION: NEVER

## 2025-05-02 ENCOUNTER — HOSPITAL ENCOUNTER (OUTPATIENT)
Dept: RADIOLOGY | Facility: CLINIC | Age: 80
Discharge: HOME | End: 2025-05-02
Payer: MEDICARE

## 2025-05-02 DIAGNOSIS — M48.062 SPINAL STENOSIS, LUMBAR REGION WITH NEUROGENIC CLAUDICATION: ICD-10-CM

## 2025-05-02 PROCEDURE — 72148 MRI LUMBAR SPINE W/O DYE: CPT

## 2025-05-06 DIAGNOSIS — E78.2 MIXED HYPERLIPIDEMIA: ICD-10-CM

## 2025-05-06 RX ORDER — SIMVASTATIN 20 MG/1
20 TABLET, FILM COATED ORAL EVERY EVENING
Qty: 90 TABLET | Refills: 3 | Status: SHIPPED | OUTPATIENT
Start: 2025-05-06

## 2025-05-29 ENCOUNTER — APPOINTMENT (OUTPATIENT)
Dept: ENDOCRINOLOGY | Facility: CLINIC | Age: 80
End: 2025-05-29
Payer: MEDICARE

## 2025-05-29 VITALS
DIASTOLIC BLOOD PRESSURE: 62 MMHG | BODY MASS INDEX: 35.53 KG/M2 | HEART RATE: 64 BPM | HEIGHT: 60 IN | WEIGHT: 181 LBS | SYSTOLIC BLOOD PRESSURE: 140 MMHG

## 2025-05-29 DIAGNOSIS — E78.2 MIXED HYPERLIPIDEMIA: ICD-10-CM

## 2025-05-29 DIAGNOSIS — E06.3 HYPOTHYROIDISM DUE TO HASHIMOTO'S THYROIDITIS: ICD-10-CM

## 2025-05-29 DIAGNOSIS — E10.65 TYPE 1 DIABETES MELLITUS WITH HYPERGLYCEMIA (MULTI): Primary | ICD-10-CM

## 2025-05-29 DIAGNOSIS — I10 ESSENTIAL HYPERTENSION: ICD-10-CM

## 2025-05-29 LAB — POC HEMOGLOBIN A1C: 7.7 % (ref 4.2–6.5)

## 2025-05-29 PROCEDURE — 99214 OFFICE O/P EST MOD 30 MIN: CPT | Mod: 25 | Performed by: PHARMACIST

## 2025-05-29 PROCEDURE — 95251 CONT GLUC MNTR ANALYSIS I&R: CPT | Performed by: INTERNAL MEDICINE

## 2025-05-29 PROCEDURE — 99214 OFFICE O/P EST MOD 30 MIN: CPT | Performed by: INTERNAL MEDICINE

## 2025-05-29 PROCEDURE — 83036 HEMOGLOBIN GLYCOSYLATED A1C: CPT | Performed by: PHARMACIST

## 2025-05-29 NOTE — PROGRESS NOTES
Subjective   Patient ID: Pat Jaimes is a 79 y.o. female who presents for Diabetes.  HPI  Presents for follow up/metabolic management 78 yo with DM Type 1 diagnosed at age 27. History HTN, HLD, Hypothyroidism, GERD and Seizures.  A1c- 7.4% last visit,  7.1% today.      Pt is testing >4 times/day using guardian cgm.  Following carb controlled diet and knows reasonable carb allowances. Patient able to afford their medications. Patient is exercising/very active.    HTN Losartan, Amlodipine daily     STATIN Simvastatin 20mg (Jan 2024 labs ldl-70)    PUMP:  medtronic 780G with guardian cgm in automode   Basal  0000  0.50  0300  0.70  0600  0.60  2000  0.95  ICR    0000  1:20   0700  1:15  ISF    0000  50  TARGET   0000 120-120    -levemir flexpen U100 sample provided for pump failure, exp 02/28/2026, [id number]      14 days carelink data reviewed & attached:   74% in target,  1% lows,  patterns: upper 100's overnight/waking, low 200's after breakfast, mid to upper 100's through the day, low 200's after dinner, upper 100's hs   -fear of lows,  treats if/when sugar drops <110.  Often treating with OJ +/- additional carb source (cookie, etc)  Current Medications[1]   RX Allergies[2]    Review of Systems  See HPI.     Objective   /62   Pulse 64   Ht 1.524 m (5')   Wt 82.1 kg (181 lb)   BMI 35.35 kg/m²     Labs:   Lab Results   Component Value Date    HGBA1C 7.7 (A) 05/29/2025     Lab Results   Component Value Date    CALCIUM 9.4 11/15/2024    AST 23 11/15/2024    ALKPHOS 80 11/15/2024    BILITOT 1.1 11/15/2024    PROT 6.3 (L) 11/15/2024    ALBUMIN 4.2 11/15/2024    GLOB 2.2 04/21/2023    AGR 1.8 04/21/2023     11/15/2024    K 4.5 11/15/2024     11/15/2024    CO2 31 11/15/2024    ANIONGAP 10 11/15/2024    BUN 20 11/15/2024    CREATININE 0.74 11/15/2024    UREACREAUR 20.0 04/21/2023    GLUCOSE 197 (H) 11/15/2024    ALT 31 11/15/2024    EGFR 83 11/15/2024     Lab Results   Component Value Date    WBC 5.1  "11/15/2024    NRBC 0.0 11/15/2024    RBC 4.74 11/15/2024    HGB 14.5 11/15/2024    HCT 44.4 11/15/2024     11/15/2024     Lab Results   Component Value Date    CHOL 167 11/15/2024    TRIG 48 11/15/2024    HDL 78.8 11/15/2024    LDLCALC 79 11/15/2024     Lab Results   Component Value Date    CREATU 139.5 04/30/2024    MICROALBCREA  04/30/2024      Comment:      One or more analytes used in this calculation is outside of the analytical measurement range. Calculation cannot be performed.     Lab Results   Component Value Date    TSH 1.75 11/15/2024     Lab Results   Component Value Date    GEKTJTEO85 404 01/09/2023     Lab Results   Component Value Date    VITD25 25 (L) 11/15/2024     No results found for: \"PTH\"  Lab Results   Component Value Date    MG 2.10 03/20/2024       Assessment    1. Type 1 diabetes mellitus with hyperglycemia (Multi)    2. Mixed hyperlipidemia    3. Essential hypertension    4. Hypothyroidism due to Hashimoto's thyroiditis        Medical Decision Making  Complexity of problem: Chronic illness of diabetes mellitus uncontrolled, progressing  Data analyzed and reviewed: Reviewed prior notes, blood glucose data, labs including HgbA1c, lipids, serum chemistries.  Ordered tests.   Risk of complications and morbidities: Is definite because of use of insulin and risk of hypoglycemia.  Prescription medications reviewed and modifications made.  Compliance assessed.  Addressed social determinants of health including food insecurity.    Plan   Problem List Items Addressed This Visit       HLD (hyperlipidemia)    T1DM (type 1 diabetes mellitus) (Multi) - Primary    Relevant Orders    POCT glycosylated hemoglobin (Hb A1C) manually resulted (Completed)     Other Visit Diagnoses         Essential hypertension          Hypothyroidism due to Hashimoto's thyroiditis                -labs/tests/notes reviewed  -reviewed and counseled patient on medication monitoring and side effects    Follow " Up:    Treatment and plan discussed with Dr. Kel Pérez.   LEO Bah, PharmD, BCACP, [facility].        [1]   Current Outpatient Medications:     acetaminophen (Tylenol Extra Strength) 500 mg tablet, Take 1-2 tablets (500-1,000 mg) by mouth 3 times a day as needed for mild pain (1 - 3), moderate pain (4 - 6) or fever (temp greater than 38.0 C)., Disp: , Rfl:     amLODIPine (Norvasc) 10 mg tablet, Take 1 tablet (10 mg) by mouth once daily., Disp: 90 tablet, Rfl: 3    blood sugar diagnostic (Accu-Chek Guide test strips) strip, Test blood sugar four times a day, Disp: 400 each, Rfl: 3    cholecalciferol (Vitamin D3) 50 mcg (2,000 unit) capsule, Take 1 capsule (50 mcg) by mouth once daily., Disp: , Rfl:     diclofenac sodium (Voltaren) 1 % gel, Apply 4.5 inches (4 g) topically 3 times a day as needed (pain)., Disp: , Rfl:     ibuprofen 200 mg tablet, Take 1-2 tablets (200-400 mg) by mouth 3 times a day as needed for mild pain (1 - 3), moderate pain (4 - 6) or headaches., Disp: , Rfl:     insulin aspart, with niacinamide, (Fiasp U-100 Insulin) 100 unit/mL injection, UP  UNITS DAILY VIA INSULIN PUMP, Disp: 90 mL, Rfl: 3    levETIRAcetam (Keppra) 750 mg tablet, Take 1 tablet (750 mg) by mouth 2 times a day., Disp: , Rfl:     LevoxyL 137 mcg tablet, One tablet once/day for six days/week. Hold on Sundays., Disp: 90 tablet, Rfl: 3    losartan (Cozaar) 100 mg tablet, Take 1 tablet (100 mg) by mouth once daily., Disp: 90 tablet, Rfl: 3    multivitamin tablet, Take 1 tablet by mouth once daily., Disp: , Rfl:     oxybutynin XL (Ditropan-XL) 10 mg 24 hr tablet, Take 1 tablet (10 mg) by mouth once daily., Disp: , Rfl:     pantoprazole (ProtoNix) 40 mg EC tablet, Take 1 tablet (40 mg) by mouth once daily in the morning. Take before meals., Disp: , Rfl:     patient supplied pump INSULIN PUMP- PATIENT SUPPLIED, Inject 0-1 each (0-100 Units) under the skin., Disp: , Rfl:     simvastatin (Zocor) 20 mg tablet, TAKE 1 TABLET EVERY  EVENING, Disp: 90 tablet, Rfl: 3  [2]   Allergies  Allergen Reactions    Sulfa (Sulfonamide Antibiotics) Hives    Sulfamethoxazole-Trimethoprim Hives    Erythromycin Other    Amoxicillin-Pot Clavulanate Diarrhea

## 2025-05-29 NOTE — PATIENT INSTRUCTIONS
Your A1c was 7.7% today.     Changes to pump settings:   -Increased target glucose from 120 to 130.     If you find that you are still having issues with lows at the new target glucose, then you can try putting your pump in an activity mode by setting a temp target.    Follow up as scheduled with Dr. Pérez in Aug and in 6 months with an educator.

## 2025-06-02 ENCOUNTER — APPOINTMENT (OUTPATIENT)
Dept: PRIMARY CARE | Facility: CLINIC | Age: 80
End: 2025-06-02
Payer: MEDICARE

## 2025-06-04 NOTE — PROGRESS NOTES
Attestation signed by Kel Pérez MD on 6/4/25 at 4:44 PM.    I, Dr eKl Pérez, have reviewed this progress note, medication list, vital signs, any pertinent lab values, and any CGM data if present with the [facility] and  face to face during this visit today. This note reflects the treatment plan that was made under my direction after reviewing the above mentioned elements while face to face with the patient and CDE.  I personally answered and addressed any questions and concerns the patient had during the visit today.  The CDE entered the data in this note under my direction and I personally reviewed it, signed any lab or medication orders that I instructed to be completed. I am the billing provider for this visit and the level of service was determined by my involvement in the Medical Decision Making Component of this visit while face to face with the patient.

## 2025-06-05 LAB
ALBUMIN SERPL-MCNC: 4.2 G/DL (ref 3.6–5.1)
ALP SERPL-CCNC: 78 U/L (ref 37–153)
ALT SERPL-CCNC: 17 U/L (ref 6–29)
ANION GAP SERPL CALCULATED.4IONS-SCNC: 10 MMOL/L (CALC) (ref 7–17)
AST SERPL-CCNC: 16 U/L (ref 10–35)
BASOPHILS # BLD AUTO: 39 CELLS/UL (ref 0–200)
BASOPHILS NFR BLD AUTO: 0.9 %
BILIRUB SERPL-MCNC: 0.8 MG/DL (ref 0.2–1.2)
BUN SERPL-MCNC: 20 MG/DL (ref 7–25)
CALCIUM SERPL-MCNC: 9.2 MG/DL (ref 8.6–10.4)
CHLORIDE SERPL-SCNC: 108 MMOL/L (ref 98–110)
CO2 SERPL-SCNC: 25 MMOL/L (ref 20–32)
CREAT SERPL-MCNC: 0.7 MG/DL (ref 0.6–1)
EGFRCR SERPLBLD CKD-EPI 2021: 89 ML/MIN/1.73M2
EOSINOPHIL # BLD AUTO: 77 CELLS/UL (ref 15–500)
EOSINOPHIL NFR BLD AUTO: 1.8 %
ERYTHROCYTE [DISTWIDTH] IN BLOOD BY AUTOMATED COUNT: 12.9 % (ref 11–15)
EST. AVERAGE GLUCOSE BLD GHB EST-MCNC: 194 MG/DL
EST. AVERAGE GLUCOSE BLD GHB EST-SCNC: 10.8 MMOL/L
GLUCOSE SERPL-MCNC: 142 MG/DL (ref 65–99)
HBA1C MFR BLD: 8.4 %
HCT VFR BLD AUTO: 43.9 % (ref 35–45)
HGB BLD-MCNC: 14.6 G/DL (ref 11.7–15.5)
LYMPHOCYTES # BLD AUTO: 1299 CELLS/UL (ref 850–3900)
LYMPHOCYTES NFR BLD AUTO: 30.2 %
MCH RBC QN AUTO: 31.9 PG (ref 27–33)
MCHC RBC AUTO-ENTMCNC: 33.3 G/DL (ref 32–36)
MCV RBC AUTO: 96.1 FL (ref 80–100)
MONOCYTES # BLD AUTO: 335 CELLS/UL (ref 200–950)
MONOCYTES NFR BLD AUTO: 7.8 %
NEUTROPHILS # BLD AUTO: 2550 CELLS/UL (ref 1500–7800)
NEUTROPHILS NFR BLD AUTO: 59.3 %
PLATELET # BLD AUTO: 251 THOUSAND/UL (ref 140–400)
PMV BLD REES-ECKER: 10.1 FL (ref 7.5–12.5)
POTASSIUM SERPL-SCNC: 4.3 MMOL/L (ref 3.5–5.3)
PROT SERPL-MCNC: 6 G/DL (ref 6.1–8.1)
RBC # BLD AUTO: 4.57 MILLION/UL (ref 3.8–5.1)
SODIUM SERPL-SCNC: 143 MMOL/L (ref 135–146)
TSH SERPL-ACNC: 3.48 MIU/L (ref 0.4–4.5)
WBC # BLD AUTO: 4.3 THOUSAND/UL (ref 3.8–10.8)

## 2025-06-11 ENCOUNTER — OFFICE VISIT (OUTPATIENT)
Dept: PRIMARY CARE | Facility: CLINIC | Age: 80
End: 2025-06-11
Payer: MEDICARE

## 2025-06-11 VITALS
HEIGHT: 60 IN | DIASTOLIC BLOOD PRESSURE: 70 MMHG | WEIGHT: 182 LBS | HEART RATE: 68 BPM | SYSTOLIC BLOOD PRESSURE: 132 MMHG | OXYGEN SATURATION: 95 % | BODY MASS INDEX: 35.73 KG/M2

## 2025-06-11 DIAGNOSIS — E66.01 CLASS 2 SEVERE OBESITY WITH SERIOUS COMORBIDITY AND BODY MASS INDEX (BMI) OF 35.0 TO 35.9 IN ADULT, UNSPECIFIED OBESITY TYPE: Primary | ICD-10-CM

## 2025-06-11 DIAGNOSIS — E78.2 MIXED HYPERLIPIDEMIA: ICD-10-CM

## 2025-06-11 DIAGNOSIS — H81.12 BPPV (BENIGN PAROXYSMAL POSITIONAL VERTIGO), LEFT: ICD-10-CM

## 2025-06-11 DIAGNOSIS — Z01.89 ENCOUNTER FOR ROUTINE LABORATORY TESTING: ICD-10-CM

## 2025-06-11 DIAGNOSIS — E10.69 TYPE 1 DIABETES MELLITUS WITH OTHER SPECIFIED COMPLICATION: ICD-10-CM

## 2025-06-11 DIAGNOSIS — M81.0 AGE-RELATED OSTEOPOROSIS WITHOUT CURRENT PATHOLOGICAL FRACTURE: ICD-10-CM

## 2025-06-11 DIAGNOSIS — G40.909 SEIZURE DISORDER (MULTI): ICD-10-CM

## 2025-06-11 DIAGNOSIS — E55.9 VITAMIN D DEFICIENCY: ICD-10-CM

## 2025-06-11 DIAGNOSIS — E06.3 HYPOTHYROIDISM DUE TO HASHIMOTO THYROIDITIS: ICD-10-CM

## 2025-06-11 DIAGNOSIS — K21.9 GASTROESOPHAGEAL REFLUX DISEASE WITHOUT ESOPHAGITIS: ICD-10-CM

## 2025-06-11 DIAGNOSIS — M15.0 PRIMARY OSTEOARTHRITIS INVOLVING MULTIPLE JOINTS: ICD-10-CM

## 2025-06-11 DIAGNOSIS — I10 PRIMARY HYPERTENSION: ICD-10-CM

## 2025-06-11 DIAGNOSIS — Z00.00 ANNUAL PHYSICAL EXAM: ICD-10-CM

## 2025-06-11 DIAGNOSIS — G40.909 NONINTRACTABLE EPILEPSY WITHOUT STATUS EPILEPTICUS, UNSPECIFIED EPILEPSY TYPE (MULTI): ICD-10-CM

## 2025-06-11 DIAGNOSIS — E66.812 CLASS 2 SEVERE OBESITY WITH SERIOUS COMORBIDITY AND BODY MASS INDEX (BMI) OF 35.0 TO 35.9 IN ADULT, UNSPECIFIED OBESITY TYPE: Primary | ICD-10-CM

## 2025-06-11 DIAGNOSIS — I47.10 SVT (SUPRAVENTRICULAR TACHYCARDIA): ICD-10-CM

## 2025-06-11 PROBLEM — E66.9 OBESITY: Status: ACTIVE | Noted: 2025-06-11

## 2025-06-11 PROCEDURE — 1159F MED LIST DOCD IN RCRD: CPT | Performed by: STUDENT IN AN ORGANIZED HEALTH CARE EDUCATION/TRAINING PROGRAM

## 2025-06-11 PROCEDURE — 1125F AMNT PAIN NOTED PAIN PRSNT: CPT | Performed by: STUDENT IN AN ORGANIZED HEALTH CARE EDUCATION/TRAINING PROGRAM

## 2025-06-11 PROCEDURE — 1036F TOBACCO NON-USER: CPT | Performed by: STUDENT IN AN ORGANIZED HEALTH CARE EDUCATION/TRAINING PROGRAM

## 2025-06-11 PROCEDURE — G2211 COMPLEX E/M VISIT ADD ON: HCPCS | Performed by: STUDENT IN AN ORGANIZED HEALTH CARE EDUCATION/TRAINING PROGRAM

## 2025-06-11 PROCEDURE — 3078F DIAST BP <80 MM HG: CPT | Performed by: STUDENT IN AN ORGANIZED HEALTH CARE EDUCATION/TRAINING PROGRAM

## 2025-06-11 PROCEDURE — 99214 OFFICE O/P EST MOD 30 MIN: CPT | Performed by: STUDENT IN AN ORGANIZED HEALTH CARE EDUCATION/TRAINING PROGRAM

## 2025-06-11 PROCEDURE — 3075F SYST BP GE 130 - 139MM HG: CPT | Performed by: STUDENT IN AN ORGANIZED HEALTH CARE EDUCATION/TRAINING PROGRAM

## 2025-06-11 ASSESSMENT — ENCOUNTER SYMPTOMS
GASTROINTESTINAL NEGATIVE: 1
RESPIRATORY NEGATIVE: 1
CONSTITUTIONAL NEGATIVE: 1
CARDIOVASCULAR NEGATIVE: 1

## 2025-06-11 ASSESSMENT — PAIN SCALES - GENERAL: PAINLEVEL_OUTOF10: 9

## 2025-06-11 NOTE — PROGRESS NOTES
[facility]: [facility]  PROGRESS NOTE      Pat Jaimes is a 79 y.o. female that is presenting today for Follow-up.    Assessment/Plan   - Patient has been working with physical therapy, orthopedic surgery, and pain management regarding chronic back and hip pain / arthritis. Steroid injection did nothing to alleviate pain. Pain management is offering a newer procedure and I encouraged the patient to more strongly consider this since her current quality of life is subpar related to the pain.  - Blood pressure at goal today.  - Encouraged continued dietary, exercise, and lifestyle modification.  - Significant medication and problem list reconciliation done today.     - Labwork:   - Patient had labwork done for this appointment. Discussed today.    - A1c worse than before. Patient is on an insulin pump. Will defer to endocrinology.   - Remainder of labwork relatively unremarkable.  - Will order labwork for the patient's next appointment. Encouraged the patient to get this labwork done one week prior to the next appointment.    Diagnoses and all orders for this visit:  Class 2 severe obesity with serious comorbidity and body mass index (BMI) of 35.0 to 35.9 in adult, unspecified obesity type  -     CBC and Auto Differential; Future  -     Comprehensive Metabolic Panel; Future  -     Lipid Panel; Future  -     Hemoglobin A1C; Future  -     TSH with reflex to Free T4 if abnormal; Future  -     Vitamin D 25-Hydroxy,Total (for eval of Vitamin D levels); Future  -     Albumin-Creatinine Ratio, Urine Random; Future  Nonintractable epilepsy without status epilepticus, unspecified epilepsy type (Multi)  -     Follow Up In Primary Care  -     CBC and Auto Differential; Future  -     Comprehensive Metabolic Panel; Future  -     Lipid Panel; Future  -     Hemoglobin A1C; Future  -     TSH with reflex to Free T4 if abnormal; Future  -     Vitamin D 25-Hydroxy,Total (for eval of Vitamin D levels);  Future  -     Albumin-Creatinine Ratio, Urine Random; Future  Age-related osteoporosis without current pathological fracture  -     Follow Up In Primary Care  -     CBC and Auto Differential; Future  -     Comprehensive Metabolic Panel; Future  -     Lipid Panel; Future  -     Hemoglobin A1C; Future  -     TSH with reflex to Free T4 if abnormal; Future  -     Vitamin D 25-Hydroxy,Total (for eval of Vitamin D levels); Future  -     Albumin-Creatinine Ratio, Urine Random; Future  Type 1 diabetes mellitus with other specified complication  -     Follow Up In Primary Care  -     CBC and Auto Differential; Future  -     Comprehensive Metabolic Panel; Future  -     Lipid Panel; Future  -     Hemoglobin A1C; Future  -     TSH with reflex to Free T4 if abnormal; Future  -     Vitamin D 25-Hydroxy,Total (for eval of Vitamin D levels); Future  -     Albumin-Creatinine Ratio, Urine Random; Future  -     Tissue transglutaminase, IgA; Future  -     Referral to Endocrinology; Future  Gastroesophageal reflux disease without esophagitis  -     Follow Up In Primary Care  -     CBC and Auto Differential; Future  -     Comprehensive Metabolic Panel; Future  -     Lipid Panel; Future  -     Hemoglobin A1C; Future  -     TSH with reflex to Free T4 if abnormal; Future  -     Vitamin D 25-Hydroxy,Total (for eval of Vitamin D levels); Future  -     Albumin-Creatinine Ratio, Urine Random; Future  BPPV (benign paroxysmal positional vertigo), left  -     Follow Up In Primary Care  -     CBC and Auto Differential; Future  -     Comprehensive Metabolic Panel; Future  -     Lipid Panel; Future  -     Hemoglobin A1C; Future  -     TSH with reflex to Free T4 if abnormal; Future  -     Vitamin D 25-Hydroxy,Total (for eval of Vitamin D levels); Future  -     Albumin-Creatinine Ratio, Urine Random; Future  Primary osteoarthritis involving multiple joints  -     CBC and Auto Differential; Future  -     Comprehensive Metabolic Panel; Future  -      Lipid Panel; Future  -     Hemoglobin A1C; Future  -     TSH with reflex to Free T4 if abnormal; Future  -     Vitamin D 25-Hydroxy,Total (for eval of Vitamin D levels); Future  -     Albumin-Creatinine Ratio, Urine Random; Future  Hypothyroidism due to Hashimoto thyroiditis  -     Follow Up In Primary Care  -     CBC and Auto Differential; Future  -     Comprehensive Metabolic Panel; Future  -     Lipid Panel; Future  -     Hemoglobin A1C; Future  -     TSH with reflex to Free T4 if abnormal; Future  -     Vitamin D 25-Hydroxy,Total (for eval of Vitamin D levels); Future  -     Albumin-Creatinine Ratio, Urine Random; Future  SVT (supraventricular tachycardia)  -     Follow Up In Primary Care  -     CBC and Auto Differential; Future  -     Comprehensive Metabolic Panel; Future  -     Lipid Panel; Future  -     Hemoglobin A1C; Future  -     TSH with reflex to Free T4 if abnormal; Future  -     Vitamin D 25-Hydroxy,Total (for eval of Vitamin D levels); Future  -     Albumin-Creatinine Ratio, Urine Random; Future  Seizure disorder (Multi)  -     Follow Up In Primary Care  -     CBC and Auto Differential; Future  -     Comprehensive Metabolic Panel; Future  -     Lipid Panel; Future  -     Hemoglobin A1C; Future  -     TSH with reflex to Free T4 if abnormal; Future  -     Vitamin D 25-Hydroxy,Total (for eval of Vitamin D levels); Future  -     Albumin-Creatinine Ratio, Urine Random; Future  Mixed hyperlipidemia  -     Follow Up In Primary Care  -     CBC and Auto Differential; Future  -     Comprehensive Metabolic Panel; Future  -     Lipid Panel; Future  -     Hemoglobin A1C; Future  -     TSH with reflex to Free T4 if abnormal; Future  -     Vitamin D 25-Hydroxy,Total (for eval of Vitamin D levels); Future  -     Albumin-Creatinine Ratio, Urine Random; Future  Primary hypertension  -     Follow Up In Primary Care  -     CBC and Auto Differential; Future  -     Comprehensive Metabolic Panel; Future  -     Lipid Panel;  Future  -     Hemoglobin A1C; Future  -     TSH with reflex to Free T4 if abnormal; Future  -     Vitamin D 25-Hydroxy,Total (for eval of Vitamin D levels); Future  -     Albumin-Creatinine Ratio, Urine Random; Future  Vitamin D deficiency  -     Follow Up In Primary Care  -     CBC and Auto Differential; Future  -     Comprehensive Metabolic Panel; Future  -     Lipid Panel; Future  -     Hemoglobin A1C; Future  -     TSH with reflex to Free T4 if abnormal; Future  -     Vitamin D 25-Hydroxy,Total (for eval of Vitamin D levels); Future  -     Albumin-Creatinine Ratio, Urine Random; Future  Encounter for routine laboratory testing  Annual physical exam  -     Follow Up In Primary Care; Future    Current Outpatient Medications   Medication Instructions    acetaminophen (TYLENOL EXTRA STRENGTH) 500-1,000 mg, oral, 3 times daily PRN    amLODIPine (NORVASC) 10 mg, oral, Daily    blood sugar diagnostic (Accu-Chek Guide test strips) strip Test blood sugar four times a day    cholecalciferol (VITAMIN D3) 50 mcg, oral, Daily    diclofenac sodium (VOLTAREN) 4 g, Topical, 3 times daily PRN    ibuprofen 200-400 mg, oral, 3 times daily PRN    insulin aspart, with niacinamide, (Fiasp U-100 Insulin) 100 unit/mL injection UP  UNITS DAILY VIA INSULIN PUMP    levETIRAcetam (KEPPRA) 750 mg, oral, 2 times daily    LevoxyL 137 mcg tablet One tablet once/day for six days/week. Hold on Sundays.    losartan (COZAAR) 100 mg, oral, Daily    multivitamin tablet 1 tablet, oral, Daily    oxyBUTYnin XL (DITROPAN-XL) 10 mg, oral, Daily    pantoprazole (PROTONIX) 40 mg, oral, Daily before breakfast    patient supplied pump INSULIN PUMP- PATIENT SUPPLIED 0-100 Units    simvastatin (ZOCOR) 20 mg, oral, Every evening     Subjective   - The patient otherwise feels well and denies any acute symptoms or concerns at this time.  - The patient denies any changes or progression of their chronic medical problems.  - The patient denies any problems or  "concerns with their medications.      Review of Systems   Constitutional: Negative.    Respiratory: Negative.     Cardiovascular: Negative.    Gastrointestinal: Negative.    All other systems reviewed and are negative.     Objective   Vitals:    06/11/25 0930   BP: 132/70   Pulse: 68   SpO2: 95%      Body mass index is 35.54 kg/m².  Physical Exam  Vitals and nursing note reviewed.   Constitutional:       General: She is not in acute distress.  Neck:      Vascular: No carotid bruit.   Cardiovascular:      Rate and Rhythm: Normal rate and regular rhythm.      Heart sounds: Normal heart sounds.   Pulmonary:      Effort: Pulmonary effort is normal.      Breath sounds: Normal breath sounds.   Musculoskeletal:         General: No swelling.   Neurological:      Mental Status: She is alert. Mental status is at baseline.   Psychiatric:         Mood and Affect: Mood normal.       Diagnostic Results   Lab Results   Component Value Date    GLUCOSE 142 (H) 06/04/2025    CALCIUM 9.2 06/04/2025     06/04/2025    K 4.3 06/04/2025    CO2 25 06/04/2025     06/04/2025    BUN 20 06/04/2025    CREATININE 0.70 06/04/2025     Lab Results   Component Value Date    ALT 17 06/04/2025    AST 16 06/04/2025    ALKPHOS 78 06/04/2025    BILITOT 0.8 06/04/2025     Lab Results   Component Value Date    WBC 4.3 06/04/2025    HGB 14.6 06/04/2025    HCT 43.9 06/04/2025    MCV 96.1 06/04/2025     06/04/2025     Lab Results   Component Value Date    CHOL 167 11/15/2024    CHOL 164 01/10/2024    CHOL 140 04/21/2023     Lab Results   Component Value Date    HDL 78.8 11/15/2024    HDL 81.0 01/10/2024    HDL 72 04/21/2023     Lab Results   Component Value Date    LDLCALC 79 11/15/2024    LDLCALC 70 01/10/2024    LDLCALC 52 (L) 04/21/2023     Lab Results   Component Value Date    TRIG 48 11/15/2024    TRIG 65 01/10/2024    TRIG 79 04/21/2023     No components found for: \"CHOLHDL\"  Lab Results   Component Value Date    HGBA1C 8.4 (H) " 06/04/2025     Other labs not included in the list above were reviewed either before or during this encounter.    History    Medical History[1]  Surgical History[2]  Family History[3]  Social History     Socioeconomic History    Marital status:      Spouse name: Not on file    Number of children: Not on file    Years of education: Not on file    Highest education level: Not on file   Occupational History    Not on file   Tobacco Use    Smoking status: Never     Passive exposure: Never    Smokeless tobacco: Never   Vaping Use    Vaping status: Never Used   Substance and Sexual Activity    Alcohol use: Not Currently    Drug use: Never    Sexual activity: Not Currently     Partners: Male   Other Topics Concern    Not on file   Social History Narrative    Not on file     Social Drivers of Health     Financial Resource Strain: Low Risk  (3/20/2024)    Overall Financial Resource Strain (CARDIA)     Difficulty of Paying Living Expenses: Not hard at all   Food Insecurity: No Food Insecurity (3/20/2024)    Hunger Vital Sign     Worried About Running Out of Food in the Last Year: Never true     Ran Out of Food in the Last Year: Never true   Transportation Needs: No Transportation Needs (3/20/2024)    PRAPARE - Transportation     Lack of Transportation (Medical): No     Lack of Transportation (Non-Medical): No   Physical Activity: Inactive (3/20/2024)    Exercise Vital Sign     Days of Exercise per Week: 0 days     Minutes of Exercise per Session: 0 min   Stress: No Stress Concern Present (3/20/2024)    [employer] - Occupational Stress Questionnaire     Feeling of Stress : Not at all   Social Connections: Socially Isolated (3/20/2024)    Social Connection and Isolation Panel [NHANES]     Frequency of Communication with Friends and Family: More than three times a week     Frequency of Social Gatherings with Friends and Family: Never     Attends Worship Services: Never     Active Member of  Clubs or Organizations: No     Attends Club or Organization Meetings: Never     Marital Status:    Intimate Partner Violence: Not At Risk (3/20/2024)    Humiliation, Afraid, Rape, and Kick questionnaire     Fear of Current or Ex-Partner: No     Emotionally Abused: No     Physically Abused: No     Sexually Abused: No   Housing Stability: Low Risk  (3/20/2024)    Housing Stability Vital Sign     Unable to Pay for Housing in the Last Year: No     Number of Places Lived in the Last Year: 1     Unstable Housing in the Last Year: No     Allergies[4]  Medications Ordered Prior to Encounter[5]  Immunization History   Administered Date(s) Administered    COVID-19, mRNA, LNP-S, PF, 30 mcg/0.3 mL dose 03/04/2021, 04/01/2021, 11/08/2021    Flu vaccine, quadrivalent, high-dose, preservative free, age 65y+ (FLUZONE) 09/11/2020, 09/28/2020, 10/04/2021, 09/19/2022, 09/29/2023    Flu vaccine, trivalent, preservative free, HIGH-DOSE, age 65y+ (Fluzone) 10/02/2018, 09/06/2024    Flu vaccine, trivalent, preservative free, age 6 months and greater (Fluarix/Fluzone/Flulaval) 10/06/2008    Influenza, injectable, quadrivalent 10/30/2017, 09/26/2019, 09/30/2022    Influenza, seasonal, injectable 10/01/2012, 10/01/2014, 10/15/2014, 09/24/2015    Novel influenza-H1N1-09, preservative-free 11/15/2009    Pfizer COVID-19 vaccine, 12 years and older, (30mcg/0.3mL) (Comirnaty) 12/12/2023, 09/06/2024    Pfizer COVID-19 vaccine, bivalent, age 12 years and older (30 mcg/0.3 mL) 12/19/2022    Pfizer Purple Cap SARS-CoV-2 03/01/2021    Pneumococcal conjugate vaccine, 13-valent (PREVNAR 13) 11/22/2012    Pneumococcal conjugate vaccine, 20-valent (PREVNAR 20) 11/22/2024    RESPIRATORY SYNCYTIAL VIRUS (RSV), ELIGIBLE PREGNANT PTS, 0.5 ML (ABRYSVO) 09/29/2023    Tdap vaccine, age 7 year and older (BOOSTRIX, ADACEL) 06/11/2016    Zoster, live 12/11/2009     Patient's medical history was reviewed and updated either before or during this encounter.        En Yee MD         [1]   Past Medical History:  Diagnosis Date    Diabetes mellitus type I (Multi)     Hyperlipidemia     Hypertension     Hypothyroidism     Insulin pump in place     Other specified diabetes mellitus without complications     Diabetes mellitus of other type without complication    Personal history of other diseases of the circulatory system     History of hypertension    Seizure disorder (Multi)    [2]   Past Surgical History:  Procedure Laterality Date    BI STEREOTACTIC GUIDED BREAST RIGHT LOCALIZATION AND BIOPSY Right 02/02/2018    BI STEREOTACTIC GUIDED BREAST LOCALIZATION AND BIOPSY RIGHT [facility]    KNEE SURGERY Right 1989    ARTHROSCOPY    OTHER SURGICAL HISTORY  05/25/2021    Craniotomy    TUBAL LIGATION Bilateral 1982   [3]   Family History  Problem Relation Name Age of Onset    Diabetes Mother      Heart disease Mother      Heart disease Father     [4]   Allergies  Allergen Reactions    Sulfa (Sulfonamide Antibiotics) Hives    Sulfamethoxazole-Trimethoprim Hives    Erythromycin Other    Amoxicillin-Pot Clavulanate Diarrhea   [5]   Current Outpatient Medications on File Prior to Visit   Medication Sig Dispense Refill    acetaminophen (Tylenol Extra Strength) 500 mg tablet Take 1-2 tablets (500-1,000 mg) by mouth 3 times a day as needed for mild pain (1 - 3), moderate pain (4 - 6) or fever (temp greater than 38.0 C).      amLODIPine (Norvasc) 10 mg tablet Take 1 tablet (10 mg) by mouth once daily. 90 tablet 3    blood sugar diagnostic (Accu-Chek Guide test strips) strip Test blood sugar four times a day 400 each 3    cholecalciferol (Vitamin D3) 50 mcg (2,000 unit) capsule Take 1 capsule (50 mcg) by mouth once daily.      diclofenac sodium (Voltaren) 1 % gel Apply 4.5 inches (4 g) topically 3 times a day as needed (pain).      ibuprofen 200 mg tablet Take 1-2 tablets (200-400 mg) by mouth 3 times a day as needed for mild pain (1 - 3), moderate pain (4 - 6) or  headaches.      insulin aspart, with niacinamide, (Fiasp U-100 Insulin) 100 unit/mL injection UP  UNITS DAILY VIA INSULIN PUMP 90 mL 3    levETIRAcetam (Keppra) 750 mg tablet Take 1 tablet (750 mg) by mouth 2 times a day.      LevoxyL 137 mcg tablet One tablet once/day for six days/week. Hold on Sundays. 90 tablet 3    losartan (Cozaar) 100 mg tablet Take 1 tablet (100 mg) by mouth once daily. 90 tablet 3    multivitamin tablet Take 1 tablet by mouth once daily.      oxybutynin XL (Ditropan-XL) 10 mg 24 hr tablet Take 1 tablet (10 mg) by mouth once daily.      pantoprazole (ProtoNix) 40 mg EC tablet Take 1 tablet (40 mg) by mouth once daily in the morning. Take before meals.      patient supplied pump INSULIN PUMP- PATIENT SUPPLIED Inject 0-1 each (0-100 Units) under the skin.      simvastatin (Zocor) 20 mg tablet TAKE 1 TABLET EVERY EVENING 90 tablet 3     No current facility-administered medications on file prior to visit.

## 2025-06-11 NOTE — PATIENT INSTRUCTIONS
- Patient has been working with physical therapy, orthopedic surgery, and pain management regarding chronic back and hip pain / arthritis. Steroid injection did nothing to alleviate pain. Pain management is offering a newer procedure and I encouraged the patient to more strongly consider this since her current quality of life is subpar related to the pain.  - Blood pressure at goal today.  - Encouraged continued dietary, exercise, and lifestyle modification.  - Significant medication and problem list reconciliation done today.     - Labwork:   - Patient had labwork done for this appointment. Discussed today.    - A1c worse than before. Patient is on an insulin pump. Will defer to endocrinology.   - Remainder of labwork relatively unremarkable.  - Will order labwork for the patient's next appointment. Encouraged the patient to get this labwork done one week prior to the next appointment.

## 2025-07-11 DIAGNOSIS — E10.65 TYPE 1 DIABETES MELLITUS WITH HYPERGLYCEMIA (MULTI): Primary | ICD-10-CM

## 2025-07-11 DIAGNOSIS — E10.69 TYPE 1 DIABETES MELLITUS WITH OTHER SPECIFIED COMPLICATION: ICD-10-CM

## 2025-07-11 DIAGNOSIS — E10.65 TYPE 1 DIABETES MELLITUS WITH HYPERGLYCEMIA (MULTI): ICD-10-CM

## 2025-07-11 RX ORDER — NEEDLES, SAFETY 22GX1 1/2"
1 NEEDLE, DISPOSABLE MISCELLANEOUS DAILY
Qty: 10 EACH | Refills: 1 | Status: SHIPPED | OUTPATIENT
Start: 2025-07-11

## 2025-07-11 RX ORDER — INSULIN ASPART INJECTION 100 [IU]/ML
INJECTION, SOLUTION SUBCUTANEOUS
Qty: 90 ML | Refills: 3 | Status: SHIPPED | OUTPATIENT
Start: 2025-07-11

## 2025-07-11 RX ORDER — INSULIN DEGLUDEC 100 U/ML
INJECTION, SOLUTION SUBCUTANEOUS
Qty: 15 ML | Refills: 1 | Status: SHIPPED | OUTPATIENT
Start: 2025-07-11

## 2025-07-11 RX ORDER — INSULIN GLARGINE 100 [IU]/ML
INJECTION, SOLUTION SUBCUTANEOUS
Qty: 10 ML | Refills: 12 | Status: SHIPPED | OUTPATIENT
Start: 2025-07-11 | End: 2025-07-11

## 2025-07-15 ENCOUNTER — DOCUMENTATION (OUTPATIENT)
Dept: PHYSICAL THERAPY | Facility: CLINIC | Age: 80
End: 2025-07-15
Payer: MEDICARE

## 2025-07-15 NOTE — PROGRESS NOTES
Physical Therapy    Discharge Summary    Name: Pat Jaimes  MRN: [id number]  : 1945  Date: 07/15/25    Discharge Summary: PT    Discharge Information: Date of discharge 7/15/25    Therapy Summary: pt came to many PT sessions, but no relief with R buttock pain except with using walker for longer bouts of ambulation, which pt declines to use despite relief      Rehab Discharge Reason: Other pt met potential for PT

## 2025-07-22 ENCOUNTER — TELEPHONE (OUTPATIENT)
Facility: CLINIC | Age: 80
End: 2025-07-22
Payer: MEDICARE

## 2025-07-25 ENCOUNTER — TELEPHONE (OUTPATIENT)
Facility: CLINIC | Age: 80
End: 2025-07-25
Payer: MEDICARE

## 2025-07-25 NOTE — TELEPHONE ENCOUNTER
Patient's  calling he went on line about how to use the generic fiasp he said he need more direction on how to use this it works completely different and also does she take before eating? Please advise

## 2025-08-04 ENCOUNTER — TELEPHONE (OUTPATIENT)
Facility: CLINIC | Age: 80
End: 2025-08-04
Payer: MEDICARE

## 2025-08-05 NOTE — PROGRESS NOTES
"HPI   80 yo with DM Type 1 (diagnosed at age 27), HTN, HLD, Hypothyroidism, GERD and Seizures.  A1c- 7.7% last visit,  7.8% today.       Pt is testing >4 times/day using guardian cgm.  Following carb controlled diet and knows reasonable carb allowances. Patient able to afford their medications. Patient is exercising/very active.     PUMP:  medtronic 780G with guardian cgm in automode   Basal  0000  0.50  0300  0.70  0600  0.60  2000  0.95  ICR               0000  1:20              0700  1:15  ISF                      0000  50  TARGET              0000     130-130     -levemir flexpen U100 sample provided for pump failure, exp 02/28/2026, [id number]        14 days [facility] data reviewed & attached:   50% in target,  0% lows,  patterns: upper 100's overnight/waking, low 200's after breakfast, mid to upper 100's through the day, low 300's after dinner at times, upper 100's hs   -having some low trending number overnight    -had discussed exercise mode    Taking losartan 100mg daily and amlodipine 10mg daily for htn.      Taking simvastatin 20mg daily for lipids.      Taking Levoxyl 137mcg 6 days a week.       Current Outpatient Medications:     acetaminophen (Tylenol Extra Strength) 500 mg tablet, Take 1-2 tablets (500-1,000 mg) by mouth 3 times a day as needed for mild pain (1 - 3), moderate pain (4 - 6) or fever (temp greater than 38.0 C)., Disp: , Rfl:     amLODIPine (Norvasc) 10 mg tablet, Take 1 tablet (10 mg) by mouth once daily., Disp: 90 tablet, Rfl: 3    BD Insulin Syringe Ultra-Fine 0.3 mL 31 gauge x 5/16\" syringe, 1 EACH ONCE DAILY. USE IN CASE PUMP FAILS, Disp: , Rfl:     blood sugar diagnostic (Accu-Chek Guide test strips) strip, Test blood sugar four times a day, Disp: 400 each, Rfl: 3    cholecalciferol (Vitamin D3) 50 mcg (2,000 unit) capsule, Take 1 capsule (50 mcg) by mouth once daily., Disp: , Rfl:     diclofenac sodium (Voltaren) 1 % gel, Apply 4.5 inches (4 g) topically 3 times a day as needed " "(pain)., Disp: , Rfl:     insulin aspart, with niacinamide, (Fiasp U-100 Insulin) 100 unit/mL injection, UP  UNITS DAILY VIA INSULIN PUMP, Disp: 90 mL, Rfl: 3    insulin degludec (Tresiba) 100 unit/mL injection, -up to 20 units daily if insulin pump not working, Disp: 15 mL, Rfl: 1    insulin syringe,safety needle (BD SafetyGlide Insulin Syringe) 0.3 mL 31 gauge x 5/16\" syringe, 1 each once daily. Use in case pump fails, Disp: 10 each, Rfl: 1    levETIRAcetam (Keppra) 750 mg tablet, Take 1 tablet (750 mg) by mouth 2 times a day., Disp: , Rfl:     LevoxyL 137 mcg tablet, One tablet once/day for six days/week. Hold on Sundays., Disp: 90 tablet, Rfl: 3    losartan (Cozaar) 100 mg tablet, Take 1 tablet (100 mg) by mouth once daily., Disp: 90 tablet, Rfl: 3    oxybutynin XL (Ditropan-XL) 10 mg 24 hr tablet, Take 1 tablet (10 mg) by mouth once daily., Disp: , Rfl:     pantoprazole (ProtoNix) 40 mg EC tablet, Take 1 tablet (40 mg) by mouth once daily in the morning. Take before meals., Disp: , Rfl:     patient supplied pump INSULIN PUMP- PATIENT SUPPLIED, Inject 0-1 each (0-100 Units) under the skin., Disp: , Rfl:     simvastatin (Zocor) 20 mg tablet, TAKE 1 TABLET EVERY EVENING, Disp: 90 tablet, Rfl: 3    ibuprofen 200 mg tablet, Take 1-2 tablets (200-400 mg) by mouth 3 times a day as needed for mild pain (1 - 3), moderate pain (4 - 6) or headaches. (Patient not taking: Reported on 8/6/2025), Disp: , Rfl:     multivitamin tablet, Take 1 tablet by mouth once daily. (Patient not taking: Reported on 8/6/2025), Disp: , Rfl:       Allergies as of 08/06/2025 - Reviewed 08/06/2025   Allergen Reaction Noted    Sulfa (sulfonamide antibiotics) Hives 09/02/2023    Sulfamethoxazole-trimethoprim Hives 09/02/2023    Erythromycin Other 09/02/2023    Amoxicillin-pot clavulanate Diarrhea 09/02/2023         Review of Systems   Cardiology: Lightheadedness-denies.  Chest pain-denies.  Leg edema-denies.  " Palpitations-denies.  Respiratory: Cough-denies. Shortness of breath-denies.  Wheezing-denies.  Gastroenterology: Constipation-denies.  Diarrhea-denies.  Heartburn-denies.  Endocrinology: Cold intolerance-denies.  Heat intolerance-denies.  Sweats-denies.  Neurology: Headache-denies.  Tremor-denies.  Neuropathy in extremities-denies.  Psychology: Low energy-denies.  Irritability-denies.  Sleep disturbances-denies.      /60 (BP Location: Left arm, Patient Position: Sitting)   Pulse 65   Ht (!) 1.524 m (5')   Wt 82.6 kg (182 lb 3.2 oz)   BMI 35.58 kg/m²       Labs:  Lab Results   Component Value Date    WBC 4.3 06/04/2025    NRBC 0.0 11/15/2024    RBC 4.57 06/04/2025    HGB 14.6 06/04/2025    HCT 43.9 06/04/2025     06/04/2025     Lab Results   Component Value Date    CALCIUM 9.2 06/04/2025    AST 16 06/04/2025    ALKPHOS 78 06/04/2025    BILITOT 0.8 06/04/2025    PROT 6.0 (L) 06/04/2025    ALBUMIN 4.2 06/04/2025    GLOB 2.2 04/21/2023    AGR 1.8 04/21/2023     06/04/2025    K 4.3 06/04/2025     06/04/2025    CO2 25 06/04/2025    ANIONGAP 10 06/04/2025    BUN 20 06/04/2025    CREATININE 0.70 06/04/2025    UREACREAUR 20.0 04/21/2023    GLUCOSE 142 (H) 06/04/2025    ALT 17 06/04/2025    EGFR 89 06/04/2025     Lab Results   Component Value Date    CHOL 167 11/15/2024    TRIG 48 11/15/2024    HDL 78.8 11/15/2024    LDLCALC 79 11/15/2024     Lab Results   Component Value Date    MICROALBCREA  04/30/2024      Comment:      One or more analytes used in this calculation is outside of the analytical measurement range. Calculation cannot be performed.     Lab Results   Component Value Date    TSH 3.48 06/04/2025     Lab Results   Component Value Date    RTPDFQGE92 404 01/09/2023     Lab Results   Component Value Date    HGBA1C 7.8 (A) 08/06/2025         Physical Exam   General Appearance: pleasant, cooperative, no acute distress  HEENT: no chemosis, no proptosis, no lid lag, no lid retraction  Neck:  no lymphadenopathy, no thyromegaly, no dominant thyroid nodules  Heart: no murmurs, regular rate and rhythm, S1 and S2  Lungs: no wheezes, no rhonci, no rales  Extremities: no lower extremity swelling      Assessment/Plan   1. Type 1 diabetes mellitus with hyperglycemia (Multi) (Primary)  -A1c ordered and reviewed  -labs reviewed, cgm data/pump data scanned in epic      -overall doing well, consider exercise mode overnight and perhaps during the day  -would take all measures to avoid low sugars at this stage    2. Mixed hyperlipidemia  -on statin and tolerating, labs reviewed    3. Essential hypertension  -at target on therapy    4. Hypothyroidism due to Hashimoto's thyroiditis  -euthyroid on therapy, labs reviewed, will follow         Follow Up:  Flor 3 months (already scheduled), Elisa 6 months    Medical Decision Making  Complexity of problem: Chronic illness of diabetes mellitus uncontrolled, progressing  Data analyzed and reviewed: Reviewed prior notes, blood glucose data, labs including HgbA1c, lipids, serum chemistries.  Ordered tests.   Risk of complications and morbidities: Is definite because of use of insulin and risk of hypoglycemia.  Prescription medications reviewed and modifications made.  Compliance assessed.  Addressed social determinants of health including food insecurity.

## 2025-08-06 ENCOUNTER — APPOINTMENT (OUTPATIENT)
Dept: ENDOCRINOLOGY | Facility: CLINIC | Age: 80
End: 2025-08-06
Payer: MEDICARE

## 2025-08-06 VITALS
BODY MASS INDEX: 35.77 KG/M2 | WEIGHT: 182.2 LBS | SYSTOLIC BLOOD PRESSURE: 132 MMHG | HEIGHT: 60 IN | HEART RATE: 65 BPM | DIASTOLIC BLOOD PRESSURE: 60 MMHG

## 2025-08-06 DIAGNOSIS — E78.2 MIXED HYPERLIPIDEMIA: ICD-10-CM

## 2025-08-06 DIAGNOSIS — E06.3 HYPOTHYROIDISM DUE TO HASHIMOTO'S THYROIDITIS: ICD-10-CM

## 2025-08-06 DIAGNOSIS — E10.65 TYPE 1 DIABETES MELLITUS WITH HYPERGLYCEMIA (MULTI): ICD-10-CM

## 2025-08-06 DIAGNOSIS — E10.65 TYPE 1 DIABETES MELLITUS WITH HYPERGLYCEMIA (MULTI): Primary | ICD-10-CM

## 2025-08-06 DIAGNOSIS — I10 ESSENTIAL HYPERTENSION: ICD-10-CM

## 2025-08-06 LAB — POC HEMOGLOBIN A1C: 7.8 % (ref 4.2–6.5)

## 2025-08-06 PROCEDURE — 99214 OFFICE O/P EST MOD 30 MIN: CPT | Performed by: INTERNAL MEDICINE

## 2025-08-06 PROCEDURE — 1036F TOBACCO NON-USER: CPT | Performed by: INTERNAL MEDICINE

## 2025-08-06 PROCEDURE — 1159F MED LIST DOCD IN RCRD: CPT | Performed by: INTERNAL MEDICINE

## 2025-08-06 PROCEDURE — 1125F AMNT PAIN NOTED PAIN PRSNT: CPT | Performed by: INTERNAL MEDICINE

## 2025-08-06 PROCEDURE — 3075F SYST BP GE 130 - 139MM HG: CPT | Performed by: INTERNAL MEDICINE

## 2025-08-06 PROCEDURE — 83036 HEMOGLOBIN GLYCOSYLATED A1C: CPT | Performed by: INTERNAL MEDICINE

## 2025-08-06 PROCEDURE — 3078F DIAST BP <80 MM HG: CPT | Performed by: INTERNAL MEDICINE

## 2025-08-06 RX ORDER — INSULIN DEGLUDEC 100 U/ML
INJECTION, SOLUTION SUBCUTANEOUS
Qty: 30 ML | Refills: 1 | Status: SHIPPED | OUTPATIENT
Start: 2025-08-06

## 2025-08-06 RX ORDER — PEN NEEDLE, DIABETIC 29 G X1/2"
NEEDLE, DISPOSABLE MISCELLANEOUS
COMMUNITY
Start: 2025-07-11

## 2025-08-06 ASSESSMENT — ENCOUNTER SYMPTOMS
OCCASIONAL FEELINGS OF UNSTEADINESS: 0
DEPRESSION: 0
LOSS OF SENSATION IN FEET: 0

## 2025-08-06 ASSESSMENT — PATIENT HEALTH QUESTIONNAIRE - PHQ9
1. LITTLE INTEREST OR PLEASURE IN DOING THINGS: NOT AT ALL
2. FEELING DOWN, DEPRESSED OR HOPELESS: NOT AT ALL
SUM OF ALL RESPONSES TO PHQ9 QUESTIONS 1 & 2: 0

## 2025-08-06 ASSESSMENT — PAIN SCALES - GENERAL: PAINLEVEL_OUTOF10: 9

## 2025-08-18 DIAGNOSIS — I10 PRIMARY HYPERTENSION: ICD-10-CM

## 2025-08-18 RX ORDER — AMLODIPINE BESYLATE 10 MG/1
10 TABLET ORAL DAILY
Qty: 90 TABLET | Refills: 3 | Status: SHIPPED | OUTPATIENT
Start: 2025-08-18